# Patient Record
Sex: MALE | Race: WHITE | Employment: OTHER | URBAN - METROPOLITAN AREA
[De-identification: names, ages, dates, MRNs, and addresses within clinical notes are randomized per-mention and may not be internally consistent; named-entity substitution may affect disease eponyms.]

---

## 2017-01-11 ENCOUNTER — HOSPITAL ENCOUNTER (INPATIENT)
Age: 62
LOS: 8 days | Discharge: HOME HEALTH | DRG: 191 | End: 2017-01-19
Attending: EMERGENCY MEDICINE | Admitting: INTERNAL MEDICINE
Payer: COMMERCIAL

## 2017-01-11 ENCOUNTER — APPOINTMENT (OUTPATIENT)
Dept: GENERAL RADIOLOGY | Age: 62
DRG: 191 | End: 2017-01-11
Attending: EMERGENCY MEDICINE
Payer: COMMERCIAL

## 2017-01-11 DIAGNOSIS — J15.9 COMMUNITY ACQUIRED BACTERIAL PNEUMONIA: ICD-10-CM

## 2017-01-11 DIAGNOSIS — I48.19 PERSISTENT ATRIAL FIBRILLATION (HCC): ICD-10-CM

## 2017-01-11 DIAGNOSIS — I48.91 ATRIAL FIBRILLATION WITH RVR (HCC): ICD-10-CM

## 2017-01-11 DIAGNOSIS — J18.9 CAP (COMMUNITY ACQUIRED PNEUMONIA): Primary | ICD-10-CM

## 2017-01-11 DIAGNOSIS — G47.33 OSA (OBSTRUCTIVE SLEEP APNEA): ICD-10-CM

## 2017-01-11 PROBLEM — A41.9 SEPSIS (HCC): Status: ACTIVE | Noted: 2017-01-11

## 2017-01-11 LAB
ALBUMIN SERPL BCP-MCNC: 3.6 G/DL (ref 3.2–4.6)
ALBUMIN/GLOB SERPL: 1 {RATIO} (ref 1.2–3.5)
ALP SERPL-CCNC: 89 U/L (ref 50–136)
ALT SERPL-CCNC: 47 U/L (ref 12–65)
ANION GAP BLD CALC-SCNC: 12 MMOL/L (ref 7–16)
AST SERPL W P-5'-P-CCNC: 31 U/L (ref 15–37)
ATRIAL RATE: 178 BPM
BASOPHILS # BLD AUTO: 0 K/UL (ref 0–0.2)
BASOPHILS # BLD: 0 % (ref 0–2)
BILIRUB SERPL-MCNC: 1 MG/DL (ref 0.2–1.1)
BNP SERPL-MCNC: 207 PG/ML
BUN SERPL-MCNC: 12 MG/DL (ref 8–23)
CALCIUM SERPL-MCNC: 8.5 MG/DL (ref 8.3–10.4)
CALCULATED P AXIS, ECG09: NORMAL DEGREES
CALCULATED R AXIS, ECG10: 72 DEGREES
CALCULATED T AXIS, ECG11: 35 DEGREES
CHLORIDE SERPL-SCNC: 103 MMOL/L (ref 98–107)
CO2 SERPL-SCNC: 27 MMOL/L (ref 21–32)
CREAT SERPL-MCNC: 1.02 MG/DL (ref 0.8–1.5)
DIAGNOSIS, 93000: NORMAL
DIASTOLIC BP, ECG02: NORMAL MMHG
DIFFERENTIAL METHOD BLD: ABNORMAL
EOSINOPHIL # BLD: 0.1 K/UL (ref 0–0.8)
EOSINOPHIL NFR BLD: 0 % (ref 0.5–7.8)
ERYTHROCYTE [DISTWIDTH] IN BLOOD BY AUTOMATED COUNT: 12.5 % (ref 11.9–14.6)
GLOBULIN SER CALC-MCNC: 3.7 G/DL (ref 2.3–3.5)
GLUCOSE SERPL-MCNC: 93 MG/DL (ref 65–100)
HCT VFR BLD AUTO: 46.3 % (ref 41.1–50.3)
HGB BLD-MCNC: 15.9 G/DL (ref 13.6–17.2)
IMM GRANULOCYTES # BLD: 0 K/UL (ref 0–0.5)
IMM GRANULOCYTES NFR BLD AUTO: 0.3 % (ref 0–5)
LACTATE BLD-SCNC: 0.7 MMOL/L (ref 0.5–1.9)
LYMPHOCYTES # BLD AUTO: 15 % (ref 13–44)
LYMPHOCYTES # BLD: 2.1 K/UL (ref 0.5–4.6)
MAGNESIUM SERPL-MCNC: 2.3 MG/DL (ref 1.8–2.4)
MCH RBC QN AUTO: 34.3 PG (ref 26.1–32.9)
MCHC RBC AUTO-ENTMCNC: 34.3 G/DL (ref 31.4–35)
MCV RBC AUTO: 100 FL (ref 79.6–97.8)
MONOCYTES # BLD: 0.8 K/UL (ref 0.1–1.3)
MONOCYTES NFR BLD AUTO: 6 % (ref 4–12)
NEUTS SEG # BLD: 10.8 K/UL (ref 1.7–8.2)
NEUTS SEG NFR BLD AUTO: 79 % (ref 43–78)
P-R INTERVAL, ECG05: NORMAL MS
PHOSPHATE SERPL-MCNC: 2.2 MG/DL (ref 2.3–3.7)
PLATELET # BLD AUTO: 232 K/UL (ref 150–450)
PMV BLD AUTO: 10 FL (ref 10.8–14.1)
POTASSIUM SERPL-SCNC: 3.8 MMOL/L (ref 3.5–5.1)
PROCALCITONIN SERPL-MCNC: <0.1 NG/ML
PROT SERPL-MCNC: 7.3 G/DL (ref 6.3–8.2)
Q-T INTERVAL, ECG07: 284 MS
QRS DURATION, ECG06: 66 MS
QTC CALCULATION (BEZET), ECG08: 438 MS
RBC # BLD AUTO: 4.63 M/UL (ref 4.23–5.67)
SODIUM SERPL-SCNC: 142 MMOL/L (ref 136–145)
SYSTOLIC BP, ECG01: NORMAL MMHG
TROPONIN I BLD-MCNC: 0 NG/ML (ref 0–0.08)
TROPONIN I BLD-MCNC: 0.01 NG/ML (ref 0–0.08)
TSH SERPL DL<=0.005 MIU/L-ACNC: 3.13 UIU/ML (ref 0.36–3.74)
VENTRICULAR RATE, ECG03: 143 BPM
WBC # BLD AUTO: 13.8 K/UL (ref 4.3–11.1)

## 2017-01-11 PROCEDURE — 74011000258 HC RX REV CODE- 258: Performed by: EMERGENCY MEDICINE

## 2017-01-11 PROCEDURE — 84145 PROCALCITONIN (PCT): CPT | Performed by: INTERNAL MEDICINE

## 2017-01-11 PROCEDURE — 83605 ASSAY OF LACTIC ACID: CPT

## 2017-01-11 PROCEDURE — 83735 ASSAY OF MAGNESIUM: CPT | Performed by: INTERNAL MEDICINE

## 2017-01-11 PROCEDURE — 96374 THER/PROPH/DIAG INJ IV PUSH: CPT | Performed by: EMERGENCY MEDICINE

## 2017-01-11 PROCEDURE — 83880 ASSAY OF NATRIURETIC PEPTIDE: CPT | Performed by: INTERNAL MEDICINE

## 2017-01-11 PROCEDURE — 74011250636 HC RX REV CODE- 250/636: Performed by: EMERGENCY MEDICINE

## 2017-01-11 PROCEDURE — 84100 ASSAY OF PHOSPHORUS: CPT | Performed by: INTERNAL MEDICINE

## 2017-01-11 PROCEDURE — 99254 IP/OBS CNSLTJ NEW/EST MOD 60: CPT | Performed by: INTERNAL MEDICINE

## 2017-01-11 PROCEDURE — 80053 COMPREHEN METABOLIC PANEL: CPT | Performed by: EMERGENCY MEDICINE

## 2017-01-11 PROCEDURE — 74011000250 HC RX REV CODE- 250: Performed by: EMERGENCY MEDICINE

## 2017-01-11 PROCEDURE — 99285 EMERGENCY DEPT VISIT HI MDM: CPT | Performed by: EMERGENCY MEDICINE

## 2017-01-11 PROCEDURE — 71010 XR CHEST PORT: CPT

## 2017-01-11 PROCEDURE — 87040 BLOOD CULTURE FOR BACTERIA: CPT | Performed by: EMERGENCY MEDICINE

## 2017-01-11 PROCEDURE — 93005 ELECTROCARDIOGRAM TRACING: CPT | Performed by: EMERGENCY MEDICINE

## 2017-01-11 PROCEDURE — 74011250636 HC RX REV CODE- 250/636: Performed by: INTERNAL MEDICINE

## 2017-01-11 PROCEDURE — 85025 COMPLETE CBC W/AUTO DIFF WBC: CPT | Performed by: EMERGENCY MEDICINE

## 2017-01-11 PROCEDURE — 65660000000 HC RM CCU STEPDOWN

## 2017-01-11 PROCEDURE — 96376 TX/PRO/DX INJ SAME DRUG ADON: CPT | Performed by: EMERGENCY MEDICINE

## 2017-01-11 PROCEDURE — 84484 ASSAY OF TROPONIN QUANT: CPT

## 2017-01-11 PROCEDURE — 74011250637 HC RX REV CODE- 250/637: Performed by: INTERNAL MEDICINE

## 2017-01-11 PROCEDURE — 84443 ASSAY THYROID STIM HORMONE: CPT | Performed by: INTERNAL MEDICINE

## 2017-01-11 RX ORDER — ROSUVASTATIN CALCIUM 20 MG/1
20 TABLET, COATED ORAL
Status: DISCONTINUED | OUTPATIENT
Start: 2017-01-11 | End: 2017-01-19 | Stop reason: HOSPADM

## 2017-01-11 RX ORDER — DULOXETIN HYDROCHLORIDE 60 MG/1
120 CAPSULE, DELAYED RELEASE ORAL DAILY
Status: DISCONTINUED | OUTPATIENT
Start: 2017-01-12 | End: 2017-01-19 | Stop reason: HOSPADM

## 2017-01-11 RX ORDER — METOPROLOL TARTRATE 5 MG/5ML
5 INJECTION INTRAVENOUS ONCE
Status: DISCONTINUED | OUTPATIENT
Start: 2017-01-11 | End: 2017-01-11

## 2017-01-11 RX ORDER — DILTIAZEM HYDROCHLORIDE 5 MG/ML
20 INJECTION INTRAVENOUS
Status: COMPLETED | OUTPATIENT
Start: 2017-01-11 | End: 2017-01-11

## 2017-01-11 RX ORDER — GABAPENTIN 100 MG/1
200 CAPSULE ORAL 2 TIMES DAILY
Status: DISCONTINUED | OUTPATIENT
Start: 2017-01-11 | End: 2017-01-19 | Stop reason: HOSPADM

## 2017-01-11 RX ORDER — PANTOPRAZOLE SODIUM 40 MG/1
40 TABLET, DELAYED RELEASE ORAL
Status: DISCONTINUED | OUTPATIENT
Start: 2017-01-12 | End: 2017-01-19 | Stop reason: HOSPADM

## 2017-01-11 RX ORDER — LANOLIN ALCOHOL/MO/W.PET/CERES
400 CREAM (GRAM) TOPICAL
Status: DISCONTINUED | OUTPATIENT
Start: 2017-01-12 | End: 2017-01-15

## 2017-01-11 RX ORDER — IPRATROPIUM BROMIDE 0.5 MG/2.5ML
0.5 SOLUTION RESPIRATORY (INHALATION)
Status: DISCONTINUED | OUTPATIENT
Start: 2017-01-12 | End: 2017-01-13

## 2017-01-11 RX ORDER — SODIUM CHLORIDE 9 MG/ML
125 INJECTION, SOLUTION INTRAVENOUS CONTINUOUS
Status: DISCONTINUED | OUTPATIENT
Start: 2017-01-11 | End: 2017-01-13

## 2017-01-11 RX ORDER — ZOLPIDEM TARTRATE 5 MG/1
10 TABLET ORAL
Status: DISCONTINUED | OUTPATIENT
Start: 2017-01-11 | End: 2017-01-19 | Stop reason: HOSPADM

## 2017-01-11 RX ORDER — IPRATROPIUM BROMIDE 0.5 MG/2.5ML
0.5 SOLUTION RESPIRATORY (INHALATION)
Status: DISCONTINUED | OUTPATIENT
Start: 2017-01-11 | End: 2017-01-11

## 2017-01-11 RX ORDER — FLUTICASONE PROPIONATE 50 MCG
2 SPRAY, SUSPENSION (ML) NASAL DAILY
Status: DISCONTINUED | OUTPATIENT
Start: 2017-01-12 | End: 2017-01-19 | Stop reason: HOSPADM

## 2017-01-11 RX ORDER — FENOFIBRATE 160 MG/1
160 TABLET ORAL DAILY
Status: DISCONTINUED | OUTPATIENT
Start: 2017-01-12 | End: 2017-01-19 | Stop reason: HOSPADM

## 2017-01-11 RX ORDER — SODIUM CHLORIDE 0.9 % (FLUSH) 0.9 %
5-10 SYRINGE (ML) INJECTION AS NEEDED
Status: DISCONTINUED | OUTPATIENT
Start: 2017-01-11 | End: 2017-01-19 | Stop reason: HOSPADM

## 2017-01-11 RX ADMIN — SODIUM CHLORIDE 3471 ML: 900 INJECTION, SOLUTION INTRAVENOUS at 21:17

## 2017-01-11 RX ADMIN — GABAPENTIN 200 MG: 100 CAPSULE ORAL at 21:00

## 2017-01-11 RX ADMIN — GUAIFENESIN 1200 MG: 600 TABLET, EXTENDED RELEASE ORAL at 22:29

## 2017-01-11 RX ADMIN — DILTIAZEM HYDROCHLORIDE 20 MG: 5 INJECTION INTRAVENOUS at 13:48

## 2017-01-11 RX ADMIN — CEFTRIAXONE SODIUM 1 G: 1 INJECTION, POWDER, FOR SOLUTION INTRAMUSCULAR; INTRAVENOUS at 16:48

## 2017-01-11 RX ADMIN — SODIUM CHLORIDE 5 MG/HR: 900 INJECTION, SOLUTION INTRAVENOUS at 16:33

## 2017-01-11 RX ADMIN — SODIUM CHLORIDE 125 ML/HR: 900 INJECTION, SOLUTION INTRAVENOUS at 22:23

## 2017-01-11 RX ADMIN — AZITHROMYCIN MONOHYDRATE 500 MG: 500 INJECTION, POWDER, LYOPHILIZED, FOR SOLUTION INTRAVENOUS at 17:15

## 2017-01-11 RX ADMIN — DILTIAZEM HYDROCHLORIDE 20 MG: 5 INJECTION INTRAVENOUS at 15:22

## 2017-01-11 RX ADMIN — ROSUVASTATIN CALCIUM 20 MG: 20 TABLET, FILM COATED ORAL at 22:00

## 2017-01-11 NOTE — ED PROVIDER NOTES
HPI Comments: Patient states he woke up this morning with shortness of breath and palpitations. He has a history of atrial fibrillation with similar symptoms. He denies any aggravating or relieving factors and did not take any medicine for his symptoms though he says he takes around 15 medicines each morning. He denies any chest pain or abdominal pain, denies any vomiting or diarrhea. Elements of this note were created using speech recognition software. As such, errors of speech recognition may be present. Patient is a 57 y.o. male presenting with palpitations. The history is provided by the patient. Palpitations    Associated symptoms include shortness of breath. Pertinent negatives include no fever, no chest pain, no nausea and no vomiting.         Past Medical History:   Diagnosis Date    Adverse effect of anesthesia      pt woke up during last colonoscopy    Arrhythmia 6/30/16     hx of a fib- \"I'm out of it\" -per Dr Rosario Goes; fingers    Bronchitis, chronic ([address] 6/30/16     hx of     Carotid stenosis      bilat carotid stenosis    Chronic obstructive pulmonary disease ([address] 6/30/16     affirms no SOB with 1 flight of steps; no 02; scheduled meds; last used rescue inhaler 2 d ago- avg at least 1 X wk    Chronic pain      OA- ALL MAJOR JOINTS    dyslipidemia     Malignant neoplasm of prostate ([address] 12/18/2013    Prostate cancer ([address] Dx 12/2012    Psychiatric disorder      anxiety- depression    Sleep apnea      uses CPAP       Past Surgical History:   Procedure Laterality Date    Hx cyst removal      Colonoscopy      Hx prostatectomy  2/21/2013    Hx heart catheterization      Hx other surgical  1983     lumbar     Hx other surgical       pilonidal cyst     Colonoscopy N/A 7/1/2016     COLONOSCOPY performed by Primitivo Murdock MD at [facility] ENDOSCOPY         Family History:   Problem Relation Age of Onset    Heart Attack Father      MI X 2 at age 61    Heart Disease Father     Hypertension Father     Stroke Father      CVA X 2- due to off anticoagulant    Cancer Mother     Kidney Disease Other     Other Other      Nephrolithiasis       Social History     Social History    Marital status:      Spouse name: N/A    Number of children: N/A    Years of education: N/A     Occupational History    Water/ lines Retired     exp tp asbestos, disabled with COPD     Social History Main Topics    Smoking status: Former Smoker     Packs/day: 2.00     Years: 40.00     Types: Cigarettes     Quit date: 4/1/2011    Smokeless tobacco: Never Used    Alcohol use No    Drug use: No    Sexual activity: Not on file     Other Topics Concern    Not on file     Social History Narrative    80-pack-year smoking. Quit April 2011. Occasional alcohol. He is . He is on disability for COPD. Worked in the past with the [employer] working on  and [employer] were he was exposed to asbestos. ALLERGIES: Review of patient's allergies indicates no known allergies. Review of Systems   Constitutional: Negative for chills and fever. Respiratory: Positive for shortness of breath. Negative for chest tightness. Cardiovascular: Positive for palpitations. Negative for chest pain. Gastrointestinal: Negative for nausea and vomiting. All other systems reviewed and are negative. Vitals:    01/11/17 1324   BP: (!) 135/108   Pulse: (!) 122   Resp: 22   SpO2: 97%   Weight: 115.7 kg (255 lb)   Height: 5' 11\" (1.803 m)            Physical Exam   Constitutional: He is oriented to person, place, and time. He appears well-developed and well-nourished. He appears distressed. HENT:   Head: Normocephalic and atraumatic. Eyes: Conjunctivae are normal. Pupils are equal, round, and reactive to light. Neck: Normal range of motion. Neck supple. Cardiovascular:   No murmur heard.   Irregularly irregular rate Pulmonary/Chest: Effort normal and breath sounds normal.   Abdominal: Soft. Bowel sounds are normal.   Musculoskeletal: He exhibits no edema or tenderness. Neurological: He is alert and oriented to person, place, and time. Skin: Skin is warm and dry. Psychiatric: He has a normal mood and affect. His behavior is normal.   Nursing note and vitals reviewed. MDM  Number of Diagnoses or Management Options  CAP (community acquired pneumonia):   Persistent atrial fibrillation [facility]):   Diagnosis management comments: Differential diagnosis: Atrial flutter/fibrillation, V. Tach, electrolyte abnormality  3:03 PM improvement after Cardizem but heart rate is now in the 140s, will repeat cardizem  3:46 PM heart rate improved after second dose of Cardizem though it is back up in the 130s. We will start a Cardizem drip. The radiologist read his chest x-ray as a mild right basilar infiltrate, could be triggering his atrial fibrillation.   His white count is elevated with a shift  3:59 PM Spoke with Dr Claudine Thornton, requests that medicine/cardiology admit pt  4:11 PM spoke with up to cardiology, requests that I call Dr. Julius Galicia as he admits his own patients  4:22 PM spoke with Dr. Julius Galicia, requested I admit the patient to the hospitalist  4:29 PM Spoke with hospitalist, to admit the patient       Amount and/or Complexity of Data Reviewed  Clinical lab tests: ordered and reviewed  Tests in the radiology section of CPT®: ordered and reviewed  Tests in the medicine section of CPT®: ordered and reviewed  Discuss the patient with other providers: yes  Independent visualization of images, tracings, or specimens: yes    Risk of Complications, Morbidity, and/or Mortality  Presenting problems: high  Diagnostic procedures: moderate  Management options: high    Patient Progress  Patient progress: improved    ED Course       Procedures

## 2017-01-11 NOTE — IP AVS SNAPSHOT
Current Discharge Medication List  
  
Take these medications at their scheduled times Dose & Instructions Dispensing Information Comments Morning Noon Evening Bedtime CRESTOR 20 mg tablet Generic drug:  rosuvastatin Dose:  20 mg Take 20 mg by mouth nightly. Refills:  0  
     
   
   
   
  
 CYMBALTA 60 mg capsule Generic drug:  DULoxetine Dose:  120 mg Take 120 mg by mouth every morning. Indications: ANXIETY WITH DEPRESSION Refills:  0  
     
   
   
   
  
 dicyclomine 10 mg capsule Commonly known as:  BENTYL Dose:  10 mg Take 10 mg by mouth two (2) times a day. Refills:  0  
     
   
   
   
  
 ELIQUIS 5 mg tablet Generic drug:  apixaban Dose:  5 mg Take 5 mg by mouth two (2) times a day. Refills:  0  
     
   
   
   
  
 fenofibrate nanocrystallized 145 mg tablet Commonly known as:  [employer] Dose:  145 mg Take 145 mg by mouth every morning. Refills:  0  
     
   
   
   
  
 folic acid 1 mg tablet Commonly known as:  [employer] Your next dose is:  Tomorrow Dose:  1 mg Take 1 Tab by mouth daily. Quantity:  30 Tab Refills:  11  
     
  
   
   
   
  
 gabapentin 100 mg capsule Commonly known as:  NEURONTIN Dose:  200 mg Take 200 mg by mouth two (2) times a day. Refills:  0 KlonoPIN 2 mg tablet Generic drug:  clonazePAM  
   
 Dose:  2 mg Take 2 mg by mouth three (3) times daily. Refills:  0  
     
   
   
   
  
 magnesium oxide 400 mg tablet Commonly known as:  MAG-OX Dose:  400 mg Take 400 mg by mouth every morning. Last dose 6/29/16  Indications: Erika Gupta Refills:  0  
     
   
   
   
  
 omeprazole 40 mg capsule Commonly known as:  PRILOSEC Dose:  40 mg Take 40 mg by mouth nightly. Refills:  0  
     
   
   
   
  
 sotalol 160 mg tablet Commonly known as:  Cloud Kerri Your next dose is: Today Dose:  160 mg Take 1 Tab by mouth every twelve (12) hours for 30 days. Quantity:  60 Tab Refills:  0 Around 6 pm  
   
  
 tiotropium 18 mcg inhalation capsule Commonly known as:  [address] Dose:  1 Cap Take 1 Cap by inhalation daily. Mouth care after use Quantity:  30 Cap Refills:  11 Take these medications as needed Dose & Instructions Dispensing Information Comments Morning Noon Evening Bedtime  
 albuterol 90 mcg/actuation inhaler Commonly known as:  PROAIR HFA Dose:  2 Puff Take 2 Puffs by inhalation every four (4) hours as needed for Wheezing or Shortness of Breath (cough). Mouth care after use Quantity:  1 Inhaler Refills:  11  
     
   
   
   
  
 albuterol-ipratropium 2.5 mg-0.5 mg/3 ml Nebu Commonly known as:  Kalpesh Dage Other:  As needed Dose:  3 mL  
3 mL by Nebulization route every four (4) hours as needed for up to 30 days. Quantity:  90 Nebule Refills:  2  
     
   
   
   
  
 meloxicam 7.5 mg tablet Commonly known as:  MOBIC Dose:  7.5 mg Take 7.5 mg by mouth two (2) times daily as needed for Pain. Refills:  0 MUCINEX 1,200 mg Ta12 ER tablet Generic drug:  guaiFENesin Dose:  1200 mg Take 1,200 mg by mouth as needed. Refills:  0 Take these medications as directed Dose & Instructions Dispensing Information Comments Morning Noon Evening Bedtime AMBIEN 10 mg tablet Generic drug:  zolpidem Dose:  20 mg Take 20 mg by mouth. 2 tabs every night Refills:  0  
     
   
   
   
  
 cpap machine kit 9 cm qhs Refills:  0  
     
   
   
   
  
 fluticasone 50 mcg/actuation nasal spray Commonly known as:  FLONASE  
   
 1 - 2 sprays daily each nostril, blow nose prior to use and do not blow nose for 20 min. After use Quantity:  1 Bottle Refills:  11  
     
   
   
   
  
 Where to Get Your Medications Information about where to get these medications is not yet available ! Ask your nurse or doctor about these medications  
  albuterol-ipratropium 2.5 mg-0.5 mg/3 ml Nebu  
 folic acid 1 mg tablet  
 sotalol 160 mg tablet

## 2017-01-11 NOTE — ED NOTES
Spoke with Dr Ariana Mayers about order for Cardizem not titratable. Dr Ariana Mayers to bedside. Received verbal orders to change patients rate to 15.

## 2017-01-11 NOTE — CONSULTS
CONSULT NOTE    Gali Denny    1/11/2017    Date of Admission:  1/11/2017    The patient's chart is reviewed and the patient is discussed with the staff. Subjective: The patient is a 57 y.o.  male seen and evaluated at the request of Dr. Ashley Gross. He is a pt of Dr. Carie Schulte and [facility] with a hx of PAF and COPD. He was in his usual state of health until he woke up this AM with SOB. He has not had chest pain, cough, wheezing, fever, or hemoptysis. He describes a several month hx of intermittent feelings of fever and chills and had a similar episode this AM. He has not had diffuse MS pain. He came to the ER and was found to be in afib with RVR along with HTN. We were asked to assess by the ER MD. He was started on Rocephin and Zithromax by the ER. The pt has ISABELLE and has been compliant with CPAP. He does not snore with CPAP in place. Review of Systems    Denies: fevers, chills, sweats, fatigue, malaise, anorexia, weight loss   Denies: blurry vision, loss of vision, eye pain, photophobia  Denies: hearing loss, ringing in the ears, earache, epistaxis  Denies: chest pain, palpitations, syncope, orthopnea, paroxysmal nocturnal dyspnea, claudication  Denies: dysphagia, odynophagia, nausea, vomiting, diarrhea, constipation, abdominal pain, jaundice, melena   Denies: frequency, dysuria, nocturia, urinary incontinence, stones, hematuria  Denies: polydipsia/polyuria, skin changes, temperature intolerance, unexpected weight gain  Denies: back pain, joint pain, joint swelling, muscle pain, muscle weakness  Denies: bleeding problems, blood transfusions, bruising, pallor, swollen lymph nodes  Denies: headache, dysarthria, blurred vision, diplopia,seizure, focal deficits.     Admits to: dyspnea, orthopnea              Patient Active Problem List   Diagnosis Code    COPD (chronic obstructive pulmonary disease) ([facility] J44.9    Pulmonary emphysema ([facility] J43.9    GERD (gastroesophageal reflux disease) K21.9    Pure hypercholesterolemia E78.00    Impotence of organic origin N52.9    Malignant neoplasm of prostate ([facility] C61    Community acquired bacterial pneumonia J15.9    Sepsis ([facility] A41.9    Atrial fibrillation with RVR ([facility] I48.91    ISABELLE (obstructive sleep apnea) G47.33         Prior to Admission Medications   Prescriptions Last Dose Informant Patient Reported? Taking? DULoxetine (CYMBALTA) 60 mg capsule   Yes No   Sig: Take 120 mg by mouth every morning. Indications: ANXIETY WITH DEPRESSION   albuterol (PROAIR HFA) 90 mcg/actuation inhaler   No No   Sig: Take 2 Puffs by inhalation every four (4) hours as needed for Wheezing or Shortness of Breath (cough). Mouth care after use   clonazePAM (KLONOPIN) 2 mg tablet   Yes No   Sig: Take 2 mg by mouth three (3) times daily. cpap machine kit   Yes No   Si cm qhs   diltiazem (CARDIZEM) 60 mg tablet   Yes No   Sig: Take 120 mg by mouth two (2) times a day. Indications: HYPERTENSION   doxycycline (MONODOX) 100 mg capsule   No No   Sig: Take 1 Cap by mouth two (2) times a day. Eat yogurt daily   fenofibrate nanocrystallized (TRICOR) 145 mg tablet   Yes No   Sig: Take 145 mg by mouth every morning. fluticasone (FLONASE) 50 mcg/actuation nasal spray   No No   Si - 2 sprays daily each nostril, blow nose prior to use and do not blow nose for 20 min. After use   gabapentin (NEURONTIN) 100 mg capsule   Yes No   Sig: Take 200 mg by mouth two (2) times a day. guaiFENesin (MUCINEX) 1,200 mg Ta12 ER tablet   Yes No   Sig: Take 1,200 mg by mouth as needed. magnesium oxide (MAG-OX) 400 mg tablet   Yes No   Sig: Take 400 mg by mouth every morning. Last dose 16  Indications: HYPOMAGNESEMIA   omeprazole (PRILOSEC) 40 mg capsule   Yes No   Sig: Take 40 mg by mouth nightly.    predniSONE (DELTASONE) 20 mg tablet   No No   Sig: Take 2 tabs in AM after breakfast for 3 days, then 1 1/2 tab x 3 days, 1 tab x 3 days,  1/2 tab x 3 days, stop   rosuvastatin (CRESTOR) 20 mg tablet   Yes No   Sig: Take 20 mg by mouth nightly. tiotropium (SPIRIVA WITH HANDIHALER) 18 mcg inhalation capsule   No No   Sig: Take 1 Cap by inhalation daily. Mouth care after use   Patient taking differently: Take 1 Cap by inhalation every morning. Mouth care after use   zolpidem (AMBIEN) 10 mg tablet   Yes No   Sig: Take 20 mg by mouth.  2 tabs every night      Facility-Administered Medications: None       Past Medical History   Diagnosis Date    Adverse effect of anesthesia      pt woke up during last colonoscopy    Arrhythmia 6/30/16     hx of a fib- \"I'm out of it\" -per Dr Jonathan Bell; fingers    Bronchitis, chronic ([facility] 6/30/16     hx of     Carotid stenosis      bilat carotid stenosis    Chronic obstructive pulmonary disease ([address] 6/30/16     affirms no SOB with 1 flight of steps; no 02; scheduled meds; last used rescue inhaler 2 d ago- avg at least 1 X wk    Chronic pain      OA- ALL MAJOR JOINTS    dyslipidemia     Malignant neoplasm of prostate ([address] 12/18/2013    Prostate cancer ([address] Dx 12/2012    Psychiatric disorder      anxiety- depression    Sleep apnea      uses CPAP     Past Surgical History   Procedure Laterality Date    Hx cyst removal      Colonoscopy      Hx prostatectomy  2/21/2013    Hx heart catheterization      Hx other surgical  1983     lumbar     Hx other surgical       pilonidal cyst     Colonoscopy N/A 7/1/2016     COLONOSCOPY performed by Capri Hayward MD at [facility] ENDOSCOPY     Social History     Social History    Marital status:      Spouse name: N/A    Number of children: N/A    Years of education: N/A     Occupational History    Water/ lines Retired     exp tp asbestos, disabled with COPD     Social History Main Topics    Smoking status: Former Smoker     Packs/day: 2.00     Years: 40.00     Types: Cigarettes     Quit date: 4/1/2011    Smokeless tobacco: Never Used    Alcohol use No    Drug use: No    Sexual activity: Not on file     Other Topics Concern    Not on file     Social History Narrative    80-pack-year smoking. Quit April 2011. Occasional alcohol. He is . He is on disability for COPD. Worked in the past with the [employer] working on [employer] and [employer] were he was exposed to asbestos. Family History   Problem Relation Age of Onset    Heart Attack Father      MI X 2 at age 59    Heart Disease Father     Hypertension Father     Stroke Father      CVA X 2- due to off anticoagulant    Cancer Mother     Kidney Disease Other     Other Other      Nephrolithiasis     No Known Allergies    Current Facility-Administered Medications   Medication Dose Route Frequency    dilTIAZem (CARDIZEM) 100 mg in 0.9% sodium chloride (MBP/ADV) 100 mL infusion  5 mg/hr IntraVENous CONTINUOUS    azithromycin (ZITHROMAX) 500 mg in 0.9% sodium chloride (MBP/ADV) 250 mL  500 mg IntraVENous NOW    cefTRIAXone (ROCEPHIN) 1 g in 0.9% sodium chloride (MBP/ADV) 50 mL  1 g IntraVENous NOW     Current Outpatient Prescriptions   Medication Sig    clonazePAM (KLONOPIN) 2 mg tablet Take 2 mg by mouth three (3) times daily.  predniSONE (DELTASONE) 20 mg tablet Take 2 tabs in AM after breakfast for 3 days, then 1 1/2 tab x 3 days, 1 tab x 3 days,  1/2 tab x 3 days, stop    doxycycline (MONODOX) 100 mg capsule Take 1 Cap by mouth two (2) times a day. Eat yogurt daily    diltiazem (CARDIZEM) 60 mg tablet Take 120 mg by mouth two (2) times a day. Indications: HYPERTENSION    albuterol (PROAIR HFA) 90 mcg/actuation inhaler Take 2 Puffs by inhalation every four (4) hours as needed for Wheezing or Shortness of Breath (cough). Mouth care after use    tiotropium (SPIRIVA WITH HANDIHALER) 18 mcg inhalation capsule Take 1 Cap by inhalation daily. Mouth care after use (Patient taking differently: Take 1 Cap by inhalation every morning.  Mouth care after use)    guaiFENesin (MUCINEX) 1,200 mg Ta12 ER tablet Take 1,200 mg by mouth as needed.  magnesium oxide (MAG-OX) 400 mg tablet Take 400 mg by mouth every morning. Last dose 6/29/16  Indications: HYPOMAGNESEMIA    rosuvastatin (CRESTOR) 20 mg tablet Take 20 mg by mouth nightly.  omeprazole (PRILOSEC) 40 mg capsule Take 40 mg by mouth nightly.  gabapentin (NEURONTIN) 100 mg capsule Take 200 mg by mouth two (2) times a day.  fenofibrate nanocrystallized (TRICOR) 145 mg tablet Take 145 mg by mouth every morning.  fluticasone (FLONASE) 50 mcg/actuation nasal spray 1 - 2 sprays daily each nostril, blow nose prior to use and do not blow nose for 20 min. After use    zolpidem (AMBIEN) 10 mg tablet Take 20 mg by mouth. 2 tabs every night    DULoxetine (CYMBALTA) 60 mg capsule Take 120 mg by mouth every morning. Indications: ANXIETY WITH DEPRESSION    cpap machine kit 9 cm qhs         Objective:     Vitals:    01/11/17 1538 01/11/17 1605 01/11/17 1622 01/11/17 1633   BP: (!) 140/101 139/75 118/76 118/76   Pulse: 97 (!) 112 (!) 111 (!) 118   Resp:  18     Temp:  97.6 °F (36.4 °C)     SpO2: 93% 93%     Weight:       Height:           PHYSICAL EXAM     Constitutional:  the patient is well developed and in no acute distress  EENMT:  Sclera clear, pupils equal, oral mucosa moist; OP narrow  Neck: No retractions or JVD. Respiratory: Decreased BS with a few trace rhonchi  Cardiovascular:  Tachy iRR without M,G,R  Gastrointestinal: soft and non-tender; with positive bowel sounds. Musculoskeletal: warm without cyanosis. There is no lower leg edema.   Skin:  no jaundice or rashes, plethoric  Neurologic: no gross neuro deficits     Psychiatric:  alert and oriented x 3    Chest X-ray:            Recent Labs      01/11/17   1326   WBC  13.8*   HGB  15.9   HCT  46.3   PLT  232     Recent Labs      01/11/17   1326   NA  142   K  3.8   CL  103   GLU  93   CO2  27   BUN  12   CREA  1.02   CA  8.5   ALB  3.6 TBILI  1.0   ALT  47   SGOT  31     No results for input(s): PH, PCO2, PO2, HCO3 in the last 72 hours. No results for input(s): LCAD, LAC in the last 72 hours. Assessment:  (Medical Decision Making)     Hospital Problems  Date Reviewed: 12/29/2016          Codes Class Noted POA    Community acquired bacterial pneumonia ICD-10-CM: J15.9  ICD-9-CM: 482.9  1/11/2017 Unknown    Started on ATB in ER. Not convinced he has pneumonia at this point. Check PCT, BNP, and follow cx. Sepsis ([facility] ICD-10-CM: A41.9  ICD-9-CM: 038.9, 995.91  1/11/2017 Unknown    Doubt    Atrial fibrillation with RVR ([facility] ICD-10-CM: I48.91  ICD-9-CM: 427.31  1/11/2017 Yes    Needs further eval and managememt. ISABELLE (obstructive sleep apnea) ICD-10-CM: G47.33  ICD-9-CM: 327.23  1/11/2017 Yes    Compliant with CPAP. Plan:  (Medical Decision Making)     Check PCT and BNP. Follow cx. Can continue Rocephin and Zmax for now. Mucinex. Use home CPAP. --    More than 50% of the time documented was spent in face-to-face contact with the patient and in the care of the patient on the floor/unit where the patient is located. Thank you very much for this referral.  We appreciate the opportunity to participate in this patient's care. Will follow along with above stated plan.     Vanita Dumont MD

## 2017-01-11 NOTE — H&P
HOSPITALIST H&P/CONSULT      NAME:  Elizabeth Hardin   Age:  57 y.o.  :   1955   MRN:   [id number]    PCP: Verónica Fuentes MD    Attending MD: Suha Skinner DO    Treatment Team: Primary Nurse: Dayanna Thrasher; Consulting Provider: Adwoa Aparicio MD    HPI:     Elizabeth Hardin is a  57year old CM with a PMH of COPD and PAF presented to the ER with acute on chronic shortness of breath, multiple syncopal episodes over the past 4 days, and diaphresis with chills. He woke up at 0430 and felt short of breath so he sat on the side of the bed \"with my hands on my knees\". The SOB persisted through the morning so he went to the local fire dept and they took his vital signs and noticed that his HR was in the 140s so he came to the ER. Currently he complaints of anxiety, shortness of breath, non-productive cough, and diaphoresis.     Complete ROS done and is as stated in HPI or otherwise negative    Past Medical History   Diagnosis Date    Adverse effect of anesthesia      pt woke up during last colonoscopy    Arrhythmia 16     hx of a fib- \"I'm out of it\" -per Dr Thea Laws; fingers    Bronchitis, chronic ([address] 16     hx of     Carotid stenosis      bilat carotid stenosis    Chronic obstructive pulmonary disease ([address] 16     affirms no SOB with 1 flight of steps; no 02; scheduled meds; last used rescue inhaler 2 d ago- avg at least 1 X wk    Chronic pain      OA- ALL MAJOR JOINTS    dyslipidemia     Malignant neoplasm of prostate ([address] 2013    Prostate cancer ([address] Dx 2012    Psychiatric disorder      anxiety- depression    Sleep apnea      uses CPAP        Past Surgical History   Procedure Laterality Date    Hx cyst removal      Colonoscopy      Hx prostatectomy  2013    Hx heart catheterization      Hx other surgical  1983     lumbar     Hx other surgical       pilonidal cyst     Colonoscopy N/A 2016     COLONOSCOPY performed by Lisa Root MD at [facility] ENDOSCOPY        Prior to Admission Medications   Prescriptions Last Dose Informant Patient Reported? Taking? DULoxetine (CYMBALTA) 60 mg capsule   Yes No   Sig: Take 120 mg by mouth every morning. Indications: ANXIETY WITH DEPRESSION   albuterol (PROAIR HFA) 90 mcg/actuation inhaler   No No   Sig: Take 2 Puffs by inhalation every four (4) hours as needed for Wheezing or Shortness of Breath (cough). Mouth care after use   clonazePAM (KLONOPIN) 2 mg tablet   Yes No   Sig: Take 2 mg by mouth three (3) times daily. cpap machine kit   Yes No   Si cm qhs   diltiazem (CARDIZEM) 60 mg tablet   Yes No   Sig: Take 120 mg by mouth two (2) times a day. Indications: HYPERTENSION   doxycycline (MONODOX) 100 mg capsule   No No   Sig: Take 1 Cap by mouth two (2) times a day. Eat yogurt daily   fenofibrate nanocrystallized (TRICOR) 145 mg tablet   Yes No   Sig: Take 145 mg by mouth every morning. fluticasone (FLONASE) 50 mcg/actuation nasal spray   No No   Si - 2 sprays daily each nostril, blow nose prior to use and do not blow nose for 20 min. After use   gabapentin (NEURONTIN) 100 mg capsule   Yes No   Sig: Take 200 mg by mouth two (2) times a day. guaiFENesin (MUCINEX) 1,200 mg Ta12 ER tablet   Yes No   Sig: Take 1,200 mg by mouth as needed. magnesium oxide (MAG-OX) 400 mg tablet   Yes No   Sig: Take 400 mg by mouth every morning. Last dose 16  Indications: HYPOMAGNESEMIA   omeprazole (PRILOSEC) 40 mg capsule   Yes No   Sig: Take 40 mg by mouth nightly. predniSONE (DELTASONE) 20 mg tablet   No No   Sig: Take 2 tabs in AM after breakfast for 3 days, then 1 1/2 tab x 3 days, 1 tab x 3 days,  1/2 tab x 3 days, stop   rosuvastatin (CRESTOR) 20 mg tablet   Yes No   Sig: Take 20 mg by mouth nightly. tiotropium (SPIRIVA WITH HANDIHALER) 18 mcg inhalation capsule   No No   Sig: Take 1 Cap by inhalation daily.  Mouth care after use   Patient taking differently: Take 1 Cap by inhalation every morning. Mouth care after use   zolpidem (AMBIEN) 10 mg tablet   Yes No   Sig: Take 20 mg by mouth. 2 tabs every night      Facility-Administered Medications: None       No Known Allergies     Social History   Substance Use Topics    Smoking status: Former Smoker     Packs/day: 2.00     Years: 40.00     Types: Cigarettes     Quit date: 2011    Smokeless tobacco: Never Used    Alcohol use No        Family History   Problem Relation Age of Onset    Heart Attack Father      MI X 2 at age 61    Heart Disease Father     Hypertension Father     Stroke Father      CVA X 2- due to off anticoagulant    Cancer Mother     Kidney Disease Other     Other Other      Nephrolithiasis        Objective:       Visit Vitals    /76    Pulse (!) 118    Temp 97.6 °F (36.4 °C)    Resp 18    Ht 5' 11\" (1.803 m)    Wt 115.7 kg (255 lb)    SpO2 93%    BMI 35.57 kg/m2        Temp (24hrs), Av.6 °F (36.4 °C), Min:97.6 °F (36.4 °C), Max:97.6 °F (36.4 °C)      Oxygen Therapy  O2 Sat (%): 93 % (17 1605)  Pulse via Oximetry: 94 beats per minute (17 1605)  O2 Device: Room air (17 1324)      Physical Exam:      General:    Alert, cooperative, mild respiratory distress, appears stated age. Eyes:   PERRLA EOMI Anicteric  Head:   Normocephalic, without obvious abnormality, atraumatic. ENT:  Nares normal. No drainage or sinus tenderness. No lesions  Lungs:   Clear to auscultation bilaterally. No Wheezing or Rhonchi. No rales. Heart:   Irregular rate and rhythm,  Tachy, no murmur, rub or gallop. Abdomen:   Soft, non-tender. Not distended. Bowel sounds normal.   MSK:  No cyanosis. No edema. No clubbing. Spontaneously moves extremities. No deformities/lesions. Skin:     Texture, turgor normal. No rashes or lesions. Not Jaundiced. Neurologic: Alert and oriented x 3, no focal deficits   Psychiatry:      No depression/anxiety. Mood congruent for context.   Heme/Lymph/Immune: No petechiae, echymoses, overt signs of bleeding or lymphadenopathy. Data Review:   Recent Results (from the past 24 hour(s))   CBC WITH AUTOMATED DIFF    Collection Time: 01/11/17  1:26 PM   Result Value Ref Range    WBC 13.8 (H) 4.3 - 11.1 K/uL    RBC 4.63 4.23 - 5.67 M/uL    HGB 15.9 13.6 - 17.2 g/dL    HCT 46.3 41.1 - 50.3 %    .0 (H) 79.6 - 97.8 FL    MCH 34.3 (H) 26.1 - 32.9 PG    MCHC 34.3 31.4 - 35.0 g/dL    RDW 12.5 11.9 - 14.6 %    PLATELET 320 492 - 411 K/uL    MPV 10.0 (L) 10.8 - 14.1 FL    DF AUTOMATED      NEUTROPHILS 79 (H) 43 - 78 %    LYMPHOCYTES 15 13 - 44 %    MONOCYTES 6 4.0 - 12.0 %    EOSINOPHILS 0 (L) 0.5 - 7.8 %    BASOPHILS 0 0.0 - 2.0 %    IMMATURE GRANULOCYTES 0.3 0.0 - 5.0 %    ABS. NEUTROPHILS 10.8 (H) 1.7 - 8.2 K/UL    ABS. LYMPHOCYTES 2.1 0.5 - 4.6 K/UL    ABS. MONOCYTES 0.8 0.1 - 1.3 K/UL    ABS. EOSINOPHILS 0.1 0.0 - 0.8 K/UL    ABS. BASOPHILS 0.0 0.0 - 0.2 K/UL    ABS. IMM. GRANS. 0.0 0.0 - 0.5 K/UL   METABOLIC PANEL, COMPREHENSIVE    Collection Time: 01/11/17  1:26 PM   Result Value Ref Range    Sodium 142 136 - 145 mmol/L    Potassium 3.8 3.5 - 5.1 mmol/L    Chloride 103 98 - 107 mmol/L    CO2 27 21 - 32 mmol/L    Anion gap 12 7 - 16 mmol/L    Glucose 93 65 - 100 mg/dL    BUN 12 8 - 23 MG/DL    Creatinine 1.02 0.8 - 1.5 MG/DL    GFR est AA >60 >60 ml/min/1.73m2    GFR est non-AA >60 >60 ml/min/1.73m2    Calcium 8.5 8.3 - 10.4 MG/DL    Bilirubin, total 1.0 0.2 - 1.1 MG/DL    ALT 47 12 - 65 U/L    AST 31 15 - 37 U/L    Alk.  phosphatase 89 50 - 136 U/L    Protein, total 7.3 6.3 - 8.2 g/dL    Albumin 3.6 3.2 - 4.6 g/dL    Globulin 3.7 (H) 2.3 - 3.5 g/dL    A-G Ratio 1.0 (L) 1.2 - 3.5     EKG, 12 LEAD, INITIAL    Collection Time: 01/11/17  1:42 PM   Result Value Ref Range    Systolic BP  mmHg    Diastolic BP  mmHg    Ventricular Rate 143 BPM    Atrial Rate 178 BPM    P-R Interval  ms    QRS Duration 66 ms    Q-T Interval 284 ms    QTC Calculation (Bezet) 438 ms Calculated P Axis  degrees    Calculated R Axis 72 degrees    Calculated T Axis 35 degrees    Diagnosis       !! AGE AND GENDER SPECIFIC ECG ANALYSIS !!  !!! Poor data quality, interpretation may be adversely affected  Atrial fibrillation with rapid ventricular response  Abnormal ECG  Confirmed by Katiuska Muse MD (), ROXANNE MANN ([id number]) on 1/11/2017 2:00:14 PM     POC TROPONIN-I    Collection Time: 01/11/17  2:49 PM   Result Value Ref Range    Troponin-I (POC) 0.01 0.0 - 0.08 ng/ml   POC LACTIC ACID    Collection Time: 01/11/17  4:16 PM   Result Value Ref Range    Lactic Acid (POC) 0.7 0.5 - 1.9 mmol/L       Imaging /Procedures /Studies:    Chest X-Ray - Right Basilar Infiltrate    Assessment and Plan: Active Hospital Problems    Diagnosis Date Noted    Community acquired bacterial pneumonia 01/11/2017    Sepsis [address] 01/11/2017    Atrial fibrillation with RVR ([facility] 01/11/2017    ISABELLE (obstructive sleep apnea) 01/11/2017       PLAN  - Admit to telemetry unit for atrial fib with RVR (CHADS = 1) and community acquired pneumonia  - Consult Dr. Toby Coreas (Cardiology)  - Continue Cardizem gtt  - Check TSH, ECHO  - Start Ceftriaxone and Azithromycin  - Check blood cultures, procalcitonin  - Bolused NS in ER.  Will continue NS @ 125 ml/hr  - Will transition to PO Cardizem per cardiology  - Continue home CPAP    Code Status: FULL CODE  DVT Prophylaxis: Lovenox    Anticipated discharge: 2 days      [employer], DO  5:00 PM

## 2017-01-11 NOTE — IP AVS SNAPSHOT
[address] 
[phone] Patient: Yanira Weiner MRN: [id number] [facility]:2/38/5058 You are allergic to the following No active allergies Recent Documentation Height Weight BMI Smoking Status 1.829 m 108.8 kg 32.52 kg/m2 Former Smoker Emergency Contacts Name Discharge Info Relation Home Work [facility] [3] Spouse [3] [phone] About your hospitalization You were admitted on:  January 11, 2017 You last received care in the:  [facility] 3 TELEMETRY You were discharged on:  January 19, 2017 Unit phone number:  [phone] Why you were hospitalized Your primary diagnosis was:  Community Acquired Bacterial Pneumonia Your diagnoses also included:  Sepsis (Hcc), Atrial Fibrillation With Rvr (Hcc), Luke (Obstructive Sleep Apnea) Providers Seen During Your Hospitalizations Provider Role Specialty Primary office phone Ismael Henry MD Attending Provider Emergency Medicine [phone] Brian Belcher DO Attending Provider Internal Medicine [phone] Your Primary Care Physician (PCP) Primary Care Physician Office Phone Office Fax Kayla Ward  Follow-up Information Follow up With Details Comments Contact Info Angelo Leos MD  As needed [address] [facility] [address] [facility] [address] [phone] Agnes Bill MD In 3 weeks hospitalization for COPD exacerbation; Wednesday 2/15/2017 at 12:50 [address] [facility] [address] [phone] Lazarus Pacheco MD In 2 weeks started on sotalol while inpatient; [facility] [address] [facility] [id number] [phone] Your Appointments Wednesday February 01, 2017  9:00 AM EST HOSPITAL FOLLOW-UP with Lazarus Pacheco MD  
 [facility] Cardiology ([address] [facility]) [address] 
[address]  
[phone] Thursday February 02, 2017  9:00 AM EST  
BLOOD DRAW with PGU52 BLOOD DRAW [facility] 52 ([facility]OLOG[facility]) [address] [address]  
[phone] Thursday February 09, 2017  2:50 PM EST Office Visit with Shantanu Alonso DO [facility] 52 ([facility]OLOG[facility]) [address] [address]  
[phone] Wednesday February 15, 2017  1:20 PM EST HOSPITAL with Sharyle Bevel, NP [facility] ([facility]) [address] [facility] [address] [place] [address]  
[phone] Current Discharge Medication List  
  
START taking these medications Dose & Instructions Dispensing Information Comments Morning Noon Evening Bedtime  
 albuterol-ipratropium 2.5 mg-0.5 mg/3 ml Nebu Commonly known as:  [place] Savita Other:  As needed Dose:  3 mL  
3 mL by Nebulization route every four (4) hours as needed for up to 30 days. Quantity:  90 Nebule Refills:  2  
     
   
   
   
  
 folic acid 1 mg tablet Commonly known as:  [employer] Your next dose is:  Tomorrow Dose:  1 mg Take 1 Tab by mouth daily. Quantity:  30 Tab Refills:  11 CONTINUE these medications which have CHANGED Dose & Instructions Dispensing Information Comments Morning Noon Evening Bedtime  
 sotalol 160 mg tablet Commonly known as:  Stephanie Randle What changed:   
- medication strength 
- how much to take - when to take this Your next dose is: Today Dose:  160 mg Take 1 Tab by mouth every twelve (12) hours for 30 days. Quantity:  60 Tab Refills:  0 Around 6 pm  
   
  
 tiotropium 18 mcg inhalation capsule Commonly known as:  [employer] [facility] [employer] [place] 42cm  
 What changed:   
- when to take this 
- additional instructions Dose:  1 Cap Take 1 Cap by inhalation daily. Mouth care after use Quantity:  30 Cap Refills:  11 CONTINUE these medications which have NOT CHANGED Dose & Instructions Dispensing Information Comments Morning Noon Evening Bedtime  
 albuterol 90 mcg/actuation inhaler Commonly known as:  PROAIR HFA Dose:  2 Puff Take 2 Puffs by inhalation every four (4) hours as needed for Wheezing or Shortness of Breath (cough). Mouth care after use Quantity:  1 Inhaler Refills:  11 AMBIEN 10 mg tablet Generic drug:  zolpidem Dose:  20 mg Take 20 mg by mouth. 2 tabs every night Refills:  0  
     
   
   
   
  
 cpap machine kit 9 cm qhs Refills:  0  
     
   
   
   
  
 CRESTOR 20 mg tablet Generic drug:  rosuvastatin Dose:  20 mg Take 20 mg by mouth nightly. Refills:  0  
     
   
   
   
  
 CYMBALTA 60 mg capsule Generic drug:  DULoxetine Dose:  120 mg Take 120 mg by mouth every morning. Indications: ANXIETY WITH DEPRESSION Refills:  0  
     
   
   
   
  
 dicyclomine 10 mg capsule Commonly known as:  BENTYL Dose:  10 mg Take 10 mg by mouth two (2) times a day. Refills:  0  
     
   
   
   
  
 ELIQUIS 5 mg tablet Generic drug:  apixaban Dose:  5 mg Take 5 mg by mouth two (2) times a day. Refills:  0  
     
   
   
   
  
 fenofibrate nanocrystallized 145 mg tablet Commonly known as:  [employer] Dose:  145 mg Take 145 mg by mouth every morning. Refills:  0  
     
   
   
   
  
 fluticasone 50 mcg/actuation nasal spray Commonly known as:  FLONASE  
   
 1 - 2 sprays daily each nostril, blow nose prior to use and do not blow nose for 20 min. After use Quantity:  1 Bottle Refills:  11  
     
   
   
   
  
 gabapentin 100 mg capsule Commonly known as:  NEURONTIN  
   
 Dose:  200 mg Take 200 mg by mouth two (2) times a day. Refills:  0 KlonoPIN 2 mg tablet Generic drug:  clonazePAM  
   
 Dose:  2 mg Take 2 mg by mouth three (3) times daily. Refills:  0  
     
   
   
   
  
 magnesium oxide 400 mg tablet Commonly known as:  MAG-OX Dose:  400 mg Take 400 mg by mouth every morning. Last dose 6/29/16  Indications: Cherene Hem Refills:  0  
     
   
   
   
  
 meloxicam 7.5 mg tablet Commonly known as:  MOBIC Dose:  7.5 mg Take 7.5 mg by mouth two (2) times daily as needed for Pain. Refills:  0 MUCINEX 1,200 mg Ta12 ER tablet Generic drug:  guaiFENesin Dose:  1200 mg Take 1,200 mg by mouth as needed. Refills:  0  
     
   
   
   
  
 omeprazole 40 mg capsule Commonly known as:  PRILOSEC Dose:  40 mg Take 40 mg by mouth nightly. Refills:  0 STOP taking these medications   
 dilTIAZem 60 mg tablet Commonly known as:  CARDIZEM  
   
  
 doxycycline 100 mg capsule Commonly known as:  MONODOX  
   
  
 metoprolol tartrate 25 mg tablet Commonly known as:  LOPRESSOR  
   
  
 predniSONE 20 mg tablet Commonly known as:  Mohini Angola Where to Get Your Medications Information on where to get these meds will be given to you by the nurse or doctor. ! Ask your nurse or doctor about these medications  
  albuterol-ipratropium 2.5 mg-0.5 mg/3 ml Nebu  
 folic acid 1 mg tablet  
 sotalol 160 mg tablet Discharge Instructions Atrial Fibrillation: Care Instructions Your Care Instructions Atrial fibrillation is an irregular and often fast heartbeat. Treating this condition is important for several reasons. It can cause blood clots, which can travel from your heart to your brain and cause a stroke. If you have a fast heartbeat, you may feel lightheaded, dizzy, and weak.  An irregular heartbeat can also increase your risk for heart failure. Atrial fibrillation is often the result of another heart condition, such as high blood pressure or coronary artery disease. Making changes to improve your heart condition will help you stay healthy and active. Follow-up care is a key part of your treatment and safety. Be sure to make and go to all appointments, and call your doctor if you are having problems. It's also a good idea to know your test results and keep a list of the medicines you take. How can you care for yourself at home? Medicines · Take your medicines exactly as prescribed. Call your doctor if you think you are having a problem with your medicine. You will get more details on the specific medicines your doctor prescribes. · If your doctor has given you a blood thinner to prevent a stroke, be sure you get instructions about how to take your medicine safely. Blood thinners can cause serious bleeding problems. · Do not take any vitamins, over-the-counter drugs, or herbal products without talking to your doctor first. 
Lifestyle changes · Do not smoke. Smoking can increase your chance of a stroke and heart attack. If you need help quitting, talk to your doctor about stop-smoking programs and medicines. These can increase your chances of quitting for good. · Eat a heart-healthy diet. · Stay at a healthy weight. Lose weight if you need to. · Limit alcohol to 2 drinks a day for men and 1 drink a day for women. Too much alcohol can cause health problems. · Avoid colds and flu. Get a pneumococcal vaccine shot. If you have had one before, ask your doctor whether you need another dose. Get a flu shot every year. If you must be around people with colds or flu, wash your hands often. Activity · If your doctor recommends it, get more exercise. Walking is a good choice. Bit by bit, increase the amount you walk every day.  Try for at least 30 minutes on most days of the week. You also may want to swim, bike, or do other activities. Your doctor may suggest that you join a cardiac rehabilitation program so that you can have help increasing your physical activity safely. · Start light exercise if your doctor says it is okay. Even a small amount will help you get stronger, have more energy, and manage stress. Walking is an easy way to get exercise. Start out by walking a little more than you did in the hospital. Gradually increase the amount you walk. · When you exercise, watch for signs that your heart is working too hard. You are pushing too hard if you cannot talk while you are exercising. If you become short of breath or dizzy or have chest pain, sit down and rest immediately. · Check your pulse regularly. Place two fingers on the artery at the palm side of your wrist, in line with your thumb. If your heartbeat seems uneven or fast, talk to your doctor. When should you call for help? Call 911 anytime you think you may need emergency care. For example, call if: 
· You have symptoms of a heart attack. These may include: ¨ Chest pain or pressure, or a strange feeling in the chest. 
¨ Sweating. ¨ Shortness of breath. ¨ Nausea or vomiting. ¨ Pain, pressure, or a strange feeling in the back, neck, jaw, or upper belly or in one or both shoulders or arms. ¨ Lightheadedness or sudden weakness. ¨ A fast or irregular heartbeat. After you call 911, the  may tell you to chew 1 adult-strength or 2 to 4 low-dose aspirin. Wait for an ambulance. Do not try to drive yourself. · You have symptoms of a stroke. These may include: 
¨ Sudden numbness, tingling, weakness, or loss of movement in your face, arm, or leg, especially on only one side of your body. ¨ Sudden vision changes. ¨ Sudden trouble speaking. ¨ Sudden confusion or trouble understanding simple statements. ¨ Sudden problems with walking or balance. ¨ A sudden, severe headache that is different from past headaches. · You passed out (lost consciousness). Call your doctor now or seek immediate medical care if: 
· You have new or increased shortness of breath. · You feel dizzy or lightheaded, or you feel like you may faint. · Your heart rate becomes irregular. · You can feel your heart flutter in your chest or skip heartbeats. Tell your doctor if these symptoms are new or worse. Watch closely for changes in your health, and be sure to contact your doctor if you have any problems. Where can you learn more? Go to http://annalee-sapphire.info/. Enter U020 in the search box to learn more about \"Atrial Fibrillation: Care Instructions. \" Current as of: January 27, 2016 Content Version: 11.1 © 5751-0289 [employer]. Care instructions adapted under license by [employer] (which disclaims liability or warranty for this information). If you have questions about a medical condition or this instruction, always ask your healthcare professional. [place] [address] any warranty or liability for your use of this information. Electrical Cardioversion: What to Expect at [facility] Your Recovery Electrical cardioversion is a treatment for an abnormal heartbeat, such as atrial fibrillation, supraventricular tachycardia, or ventricular tachycardia (VT). It uses a brief electrical shock to reset your heart's rhythm. After cardioversion, you may have redness, like a sunburn, where the patches were. The medicines you got to make you sleepy may make you feel drowsy for the rest of the day. Your doctor may have you take medicines to help the heart beat normally and to prevent blood clots. This care sheet gives you a general idea about how long it will take for you to recover. But each person recovers at a different pace. Follow the steps below to feel better as quickly as possible. How can you care for yourself at home? Medicines · Be safe with medicines. Take your medicines exactly as prescribed. Call your doctor if you think you are having a problem with your medicine. You may take one or more of the following medicines: 
¨ Rate-control medicines to slow the heart rate. These include beta-blockers, calcium channel blockers, and digoxin. ¨ Rhythm control medicines that help the heart keep a normal rhythm. ¨ Blood thinners, also called anticoagulants, which help prevent blood clots. You will get more details on the specific medicines your doctor prescribes. Be sure you know how to take your medicines safely. · Do not take any vitamins, over-the-counter medicines, or herbal products without talking to your doctor first. 
Exercise · Start light exercise if your doctor says that it is okay. Even a small amount will help you get stronger, have more energy, and manage your stress. Walking is an easy way to get exercise. Start out by walking a little more than you did in the hospital. Bit by bit, increase the amount you walk. · When you exercise, watch for signs that your heart is working too hard. You are pushing too hard if you cannot talk while you are exercising. If you become short of breath or dizzy or have chest pain, sit down and rest right away. · Check your pulse regularly. Place two fingers on the artery at the palm side of your wrist in line with your thumb. If your heartbeat seems uneven or fast, talk to your doctor. Other instructions · Ask your doctor when you can drive again. · Do not smoke. If you need help quitting, talk to your doctor about stop-smoking programs and medicines. These can increase your chances of quitting for good. · Limit alcohol. Follow-up care is a key part of your treatment and safety. Be sure to make and go to all appointments, and call your doctor if you are having problems.  It's also a good idea to know your test results and keep a list of the medicines you take. When should you call for help? Call 911 anytime you think you may need emergency care. For example, call if: 
· You have chest pain or pressure. This may occur with: ¨ Sweating. ¨ Shortness of breath. ¨ Nausea or vomiting. ¨ Pain that spreads from the chest to the neck, jaw, or one or both shoulders or arms. ¨ A fast or uneven pulse. After calling 911, the  may tell you to chew 1 adult-strength or 2 to 4 low-dose aspirin. Wait for an ambulance. Do not try to drive yourself. · You have symptoms of a stroke. These may include: 
¨ Sudden numbness, tingling, weakness, or loss of movement in your face, arm, or leg, especially on only one side of your body. ¨ Sudden vision changes. ¨ Sudden trouble speaking. ¨ Sudden confusion or trouble understanding simple statements. ¨ Sudden problems with walking or balance. ¨ A sudden, severe headache that is different from past headaches. · You vomit blood or what looks like coffee grounds. · You pass maroon or very bloody stools. · You passed out (lost consciousness). Call your doctor now or seek immediate medical care if: 
· You feel dizzy or lightheaded, or you feel like you may faint. · Your heart rate becomes irregular. · You have shortness of breath. · You have any unusual bleeding, such as: ¨ Bruises or blood spots under the skin. ¨ A nosebleed that you cannot stop. ¨ Bleeding gums when you brush your teeth. ¨ Blood in your urine. ¨ Vaginal bleeding when you are not having your period, or heavy period bleeding. ¨ Your stools are black and tarlike or have streaks of blood. Watch closely for any changes in your health, and be sure to contact your doctor if: 
· You do not get better as expected. Where can you learn more? Go to http://annalee-sapphire.info/. Enter A617 in the search box to learn more about \"Electrical Cardioversion: What to Expect at Home. \" Current as of: January 27, 2016 Content Version: 11.1 © 4205-1901 [employer]. Care instructions adapted under license by [employer] (which disclaims liability or warranty for this information). If you have questions about a medical condition or this instruction, always ask your healthcare professional. [address] any warranty or liability for your use of this information. Sotalol (By mouth) Sotalol (CRYSTAL-ta-lol) Treats heart rhythm problems. This medicine is a beta blocker. Brand Name(s):Betapace, Betapace AF, Sorine, [employer] There may be other brand names for this medicine. When This Medicine Should Not Be Used: This medicine is not right for everyone. Do not use it if you had an allergic reaction to sotalol, or you have certain heart or lung problems. Talk with your doctor about what these problems are. How to Use This Medicine:  
Liquid, Tablet · Take your medicine as directed. Your dose may need to be changed several times to find what works best for you. · Measure the oral liquid medicine with a marked measuring spoon, oral syringe, or medicine cup. · Contact your doctor or pharmacist before your supply of this medicine starts to run low. Do not allow yourself to run out of medicine. · Read and follow the patient instructions that come with this medicine. Talk to your doctor or pharmacist if you have any questions. · Missed dose: Take a dose as soon as you remember. If it is almost time for your next dose, wait until then and take a regular dose. Do not take extra medicine to make up for a missed dose. · Store the medicine in a closed container at room temperature, away from heat, moisture, and direct light. Drugs and Foods to Avoid: Ask your doctor or pharmacist before using any other medicine, including over-the-counter medicines, vitamins, and herbal products. · Some foods and medicines can affect how sotalol works. Tell your doctor if you are using the following: ¨ Albuterol, clonidine, digoxin, guanethidine, isoproterenol, reserpine, terbutaline ¨ Blood pressure medicines ¨ Insulin or diabetes medicine ¨ Medicine to treat depression ¨ Medicine to treat an infection ¨ Other medicine to treat heart rhythm problems, such as amiodarone, disopyramide, procainamide, or quinidine ¨ Phenothiazine medicine (such as chlorpromazine, perphenazine, prochlorperazine, promethazine, thioridazine) · If you are taking an antacid that contains aluminum or magnesium hydroxide, take it 2 hours before or 2 hours after you take sotalol. Warnings While Using This Medicine: · Tell your doctor if you are pregnant or breastfeeding, or if you have kidney disease, diabetes, heart disease, angina, low blood pressure, lung or breathing problems, overactive thyroid, or a history of severe allergic reactions or heart attack. · This medicine may cause increased heart rhythm problems while your dose is being adjusted. · Do not stop using this medicine suddenly. Your doctor will need to slowly decrease your dose before you stop it completely. You could have worsening chest pain or heart rhythm problems if you stop using this medicine suddenly. · This medicine may raise or lower your blood sugar level. · This medicine may make you dizzy. Do not drive or do anything else that could be dangerous until you know how this medicine affects you. Stand up slowly if you feel dizzy or lightheaded. · Tell any doctor or dentist who treats you that you are using this medicine. · Your doctor will do lab tests at regular visits to check on the effects of this medicine. Keep all appointments. ECG tests will be needed to check for unwanted effects. · Keep all medicine out of the reach of children. Never share your medicine with anyone. Possible Side Effects While Using This Medicine:  
Call your doctor right away if you notice any of these side effects: · Allergic reaction: Itching or hives, swelling in your face or hands, swelling or tingling in your mouth or throat, chest tightness, trouble breathing · Chest pain · Dry mouth, increased thirst, muscle cramps, nausea or vomiting · Fainting, dizziness, lightheadedness · Fast, slow, or irregular heartbeat · Rapid weight gain, swelling in your hands, feet, or ankles, trouble breathing If you notice these less serious side effects, talk with your doctor: · Diarrhea · Increased sweating · Tiredness or weakness If you notice other side effects that you think are caused by this medicine, tell your doctor. Call your doctor for medical advice about side effects. You may report side effects to FDA at 8-529-FHF-4350 © 2016 [address] is for End User's use only and may not be sold, redistributed or otherwise used for commercial purposes. The above information is an  only. It is not intended as medical advice for individual conditions or treatments. Talk to your doctor, nurse or pharmacist before following any medical regimen to see if it is safe and effective for you. Discharge Orders None [employer] Announcement We are excited to announce that we are making your provider's discharge notes available to you in [employer]. You will see these notes when they are completed and signed by the physician that discharged you from your recent hospital stay. If you have any questions or concerns about any information you see in [employer], please call the Health Information Department where you were seen or reach out to your Primary Care Provider for more information about your plan of care. Introducing [facility]! Gracie Escobar introduces [employer] patient portal. Now you can access parts of your medical record, email your doctor's office, and request medication refills online. 1. In your internet browser, go to [email or web address] 2. Click on the First Time User? Click Here link in the Sign In box. You will see the New Member Sign Up page. 3. Enter your Admitly Access Code exactly as it appears below. You will not need to use this code after youve completed the sign-up process. If you do not sign up before the expiration date, you must request a new code. · Admitly Access Code: [email or web address] Expires: 3/27/2017  2:21 PM 
 
4. Enter the last four digits of your Social Security Number (xxxx) and Date of Birth (mm/dd/yyyy) as indicated and click Submit. You will be taken to the next sign-up page. 5. Create a Admitly ID. This will be your Admitly login ID and cannot be changed, so think of one that is secure and easy to remember. 6. Create a Admitly password. You can change your password at any time. 7. Enter your Password Reset Question and Answer. This can be used at a later time if you forget your password. 8. Enter your e-mail address. You will receive e-mail notification when new information is available in [address] 9. Click Sign Up. You can now view and download portions of your medical record. 10. Click the Download Summary menu link to download a portable copy of your medical information. If you have questions, please visit the Frequently Asked Questions section of the Admitly website. Remember, Admitly is NOT to be used for urgent needs. For medical emergencies, dial 911. Now available from your iPhone and Android! General Information Please provide this summary of care documentation to your next provider. Patient Signature:  ____________________________________________________________ Date:  ____________________________________________________________  
  
Boy Luevano Provider Signature:  ____________________________________________________________ Date:  ____________________________________________________________

## 2017-01-12 PROBLEM — A41.9 SEPSIS (HCC): Status: RESOLVED | Noted: 2017-01-11 | Resolved: 2017-01-12

## 2017-01-12 PROBLEM — J15.9 COMMUNITY ACQUIRED BACTERIAL PNEUMONIA: Status: RESOLVED | Noted: 2017-01-11 | Resolved: 2017-01-12

## 2017-01-12 LAB
ALBUMIN SERPL BCP-MCNC: 3.1 G/DL (ref 3.2–4.6)
ALBUMIN/GLOB SERPL: 1.1 {RATIO} (ref 1.2–3.5)
ALP SERPL-CCNC: 72 U/L (ref 50–136)
ALT SERPL-CCNC: 32 U/L (ref 12–65)
ANION GAP BLD CALC-SCNC: 9 MMOL/L (ref 7–16)
AST SERPL W P-5'-P-CCNC: 17 U/L (ref 15–37)
BASOPHILS # BLD AUTO: 0 K/UL (ref 0–0.2)
BASOPHILS # BLD: 0 % (ref 0–2)
BILIRUB SERPL-MCNC: 0.8 MG/DL (ref 0.2–1.1)
BUN SERPL-MCNC: 9 MG/DL (ref 8–23)
CALCIUM SERPL-MCNC: 8.1 MG/DL (ref 8.3–10.4)
CHLORIDE SERPL-SCNC: 108 MMOL/L (ref 98–107)
CO2 SERPL-SCNC: 26 MMOL/L (ref 21–32)
CREAT SERPL-MCNC: 0.77 MG/DL (ref 0.8–1.5)
DIFFERENTIAL METHOD BLD: ABNORMAL
EOSINOPHIL # BLD: 0.1 K/UL (ref 0–0.8)
EOSINOPHIL NFR BLD: 1 % (ref 0.5–7.8)
ERYTHROCYTE [DISTWIDTH] IN BLOOD BY AUTOMATED COUNT: 12.7 % (ref 11.9–14.6)
GLOBULIN SER CALC-MCNC: 2.9 G/DL (ref 2.3–3.5)
GLUCOSE SERPL-MCNC: 85 MG/DL (ref 65–100)
HCT VFR BLD AUTO: 42 % (ref 41.1–50.3)
HGB BLD-MCNC: 14.1 G/DL (ref 13.6–17.2)
IMM GRANULOCYTES # BLD: 0 K/UL (ref 0–0.5)
IMM GRANULOCYTES NFR BLD AUTO: 0.2 % (ref 0–5)
LYMPHOCYTES # BLD AUTO: 16 % (ref 13–44)
LYMPHOCYTES # BLD: 1.7 K/UL (ref 0.5–4.6)
MCH RBC QN AUTO: 33.8 PG (ref 26.1–32.9)
MCHC RBC AUTO-ENTMCNC: 33.6 G/DL (ref 31.4–35)
MCV RBC AUTO: 100.7 FL (ref 79.6–97.8)
MONOCYTES # BLD: 0.7 K/UL (ref 0.1–1.3)
MONOCYTES NFR BLD AUTO: 7 % (ref 4–12)
NEUTS SEG # BLD: 7.8 K/UL (ref 1.7–8.2)
NEUTS SEG NFR BLD AUTO: 76 % (ref 43–78)
PLATELET # BLD AUTO: 200 K/UL (ref 150–450)
PMV BLD AUTO: 10.1 FL (ref 10.8–14.1)
POTASSIUM SERPL-SCNC: 3.5 MMOL/L (ref 3.5–5.1)
PROT SERPL-MCNC: 6 G/DL (ref 6.3–8.2)
RBC # BLD AUTO: 4.17 M/UL (ref 4.23–5.67)
SODIUM SERPL-SCNC: 143 MMOL/L (ref 136–145)
TROPONIN I BLD-MCNC: 0.01 NG/ML (ref 0–0.08)
WBC # BLD AUTO: 10.2 K/UL (ref 4.3–11.1)

## 2017-01-12 PROCEDURE — 36415 COLL VENOUS BLD VENIPUNCTURE: CPT | Performed by: INTERNAL MEDICINE

## 2017-01-12 PROCEDURE — 74011250637 HC RX REV CODE- 250/637: Performed by: INTERNAL MEDICINE

## 2017-01-12 PROCEDURE — 85025 COMPLETE CBC W/AUTO DIFF WBC: CPT | Performed by: INTERNAL MEDICINE

## 2017-01-12 PROCEDURE — 65660000000 HC RM CCU STEPDOWN

## 2017-01-12 PROCEDURE — 94640 AIRWAY INHALATION TREATMENT: CPT

## 2017-01-12 PROCEDURE — 99232 SBSQ HOSP IP/OBS MODERATE 35: CPT | Performed by: INTERNAL MEDICINE

## 2017-01-12 PROCEDURE — 74011000258 HC RX REV CODE- 258: Performed by: INTERNAL MEDICINE

## 2017-01-12 PROCEDURE — 80053 COMPREHEN METABOLIC PANEL: CPT | Performed by: INTERNAL MEDICINE

## 2017-01-12 PROCEDURE — 74011250636 HC RX REV CODE- 250/636: Performed by: INTERNAL MEDICINE

## 2017-01-12 PROCEDURE — 74011000258 HC RX REV CODE- 258: Performed by: EMERGENCY MEDICINE

## 2017-01-12 PROCEDURE — 94760 N-INVAS EAR/PLS OXIMETRY 1: CPT

## 2017-01-12 PROCEDURE — 74011000250 HC RX REV CODE- 250: Performed by: INTERNAL MEDICINE

## 2017-01-12 PROCEDURE — C8929 TTE W OR WO FOL WCON,DOPPLER: HCPCS

## 2017-01-12 PROCEDURE — 74011000250 HC RX REV CODE- 250: Performed by: EMERGENCY MEDICINE

## 2017-01-12 RX ORDER — MAGNESIUM SULFATE 1 G/100ML
1 INJECTION INTRAVENOUS ONCE
Status: COMPLETED | OUTPATIENT
Start: 2017-01-12 | End: 2017-01-14

## 2017-01-12 RX ORDER — METOPROLOL TARTRATE 25 MG/1
25 TABLET, FILM COATED ORAL EVERY 12 HOURS
Status: DISCONTINUED | OUTPATIENT
Start: 2017-01-12 | End: 2017-01-16

## 2017-01-12 RX ORDER — LANOLIN ALCOHOL/MO/W.PET/CERES
400 CREAM (GRAM) TOPICAL DAILY
Status: DISCONTINUED | OUTPATIENT
Start: 2017-01-13 | End: 2017-01-19 | Stop reason: HOSPADM

## 2017-01-12 RX ORDER — LANOLIN ALCOHOL/MO/W.PET/CERES
100 CREAM (GRAM) TOPICAL DAILY
Status: DISCONTINUED | OUTPATIENT
Start: 2017-01-13 | End: 2017-01-19 | Stop reason: HOSPADM

## 2017-01-12 RX ORDER — DICYCLOMINE HYDROCHLORIDE 10 MG/1
10 CAPSULE ORAL 2 TIMES DAILY
COMMUNITY

## 2017-01-12 RX ORDER — FOLIC ACID 1 MG/1
1 TABLET ORAL DAILY
Status: DISCONTINUED | OUTPATIENT
Start: 2017-01-13 | End: 2017-01-19 | Stop reason: HOSPADM

## 2017-01-12 RX ORDER — METOPROLOL TARTRATE 25 MG/1
50 TABLET, FILM COATED ORAL 2 TIMES DAILY
COMMUNITY
End: 2017-01-19

## 2017-01-12 RX ORDER — MELOXICAM 7.5 MG/1
7.5 TABLET ORAL
COMMUNITY
End: 2017-03-30

## 2017-01-12 RX ORDER — CLONAZEPAM 1 MG/1
2 TABLET ORAL 2 TIMES DAILY
Status: DISCONTINUED | OUTPATIENT
Start: 2017-01-12 | End: 2017-01-19 | Stop reason: HOSPADM

## 2017-01-12 RX ORDER — SOTALOL HYDROCHLORIDE 80 MG/1
80 TABLET ORAL DAILY
COMMUNITY
End: 2017-01-19

## 2017-01-12 RX ORDER — SOTALOL HYDROCHLORIDE 80 MG/1
80 TABLET ORAL DAILY
Status: DISCONTINUED | OUTPATIENT
Start: 2017-01-12 | End: 2017-01-16

## 2017-01-12 RX ORDER — DILTIAZEM HYDROCHLORIDE 5 MG/ML
10 INJECTION INTRAVENOUS ONCE
Status: COMPLETED | OUTPATIENT
Start: 2017-01-12 | End: 2017-01-12

## 2017-01-12 RX ADMIN — SOTALOL HYDROCHLORIDE 80 MG: 80 TABLET ORAL at 11:11

## 2017-01-12 RX ADMIN — IPRATROPIUM BROMIDE 0.5 MG: 0.5 SOLUTION RESPIRATORY (INHALATION) at 20:23

## 2017-01-12 RX ADMIN — ZOLPIDEM TARTRATE 10 MG: 5 TABLET, FILM COATED ORAL at 21:27

## 2017-01-12 RX ADMIN — METOPROLOL TARTRATE 25 MG: 25 TABLET ORAL at 21:24

## 2017-01-12 RX ADMIN — SODIUM CHLORIDE 10 MG/HR: 900 INJECTION, SOLUTION INTRAVENOUS at 17:32

## 2017-01-12 RX ADMIN — GABAPENTIN 200 MG: 100 CAPSULE ORAL at 08:30

## 2017-01-12 RX ADMIN — CLONAZEPAM 2 MG: 1 TABLET ORAL at 11:10

## 2017-01-12 RX ADMIN — IPRATROPIUM BROMIDE 0.5 MG: 0.5 SOLUTION RESPIRATORY (INHALATION) at 11:13

## 2017-01-12 RX ADMIN — GABAPENTIN 200 MG: 100 CAPSULE ORAL at 21:24

## 2017-01-12 RX ADMIN — PERFLUTREN 1 ML: 6.52 INJECTION, SUSPENSION INTRAVENOUS at 14:00

## 2017-01-12 RX ADMIN — FENOFIBRATE 160 MG: 160 TABLET ORAL at 08:29

## 2017-01-12 RX ADMIN — GUAIFENESIN 1200 MG: 600 TABLET, EXTENDED RELEASE ORAL at 08:29

## 2017-01-12 RX ADMIN — GUAIFENESIN 1200 MG: 600 TABLET, EXTENDED RELEASE ORAL at 21:24

## 2017-01-12 RX ADMIN — IPRATROPIUM BROMIDE 0.5 MG: 0.5 SOLUTION RESPIRATORY (INHALATION) at 07:18

## 2017-01-12 RX ADMIN — SODIUM CHLORIDE 125 ML/HR: 900 INJECTION, SOLUTION INTRAVENOUS at 08:38

## 2017-01-12 RX ADMIN — ROSUVASTATIN CALCIUM 20 MG: 20 TABLET, FILM COATED ORAL at 21:24

## 2017-01-12 RX ADMIN — SODIUM CHLORIDE 125 ML/HR: 900 INJECTION, SOLUTION INTRAVENOUS at 15:50

## 2017-01-12 RX ADMIN — MAGNESIUM SULFATE HEPTAHYDRATE 1 G: 1 INJECTION, SOLUTION INTRAVENOUS at 19:36

## 2017-01-12 RX ADMIN — Medication 400 MG: at 06:16

## 2017-01-12 RX ADMIN — CLONAZEPAM 2 MG: 1 TABLET ORAL at 17:05

## 2017-01-12 RX ADMIN — SODIUM CHLORIDE 10 MG/HR: 900 INJECTION, SOLUTION INTRAVENOUS at 11:20

## 2017-01-12 RX ADMIN — IPRATROPIUM BROMIDE 0.5 MG: 0.5 SOLUTION RESPIRATORY (INHALATION) at 16:00

## 2017-01-12 RX ADMIN — AZITHROMYCIN MONOHYDRATE 500 MG: 500 INJECTION, POWDER, LYOPHILIZED, FOR SOLUTION INTRAVENOUS at 17:10

## 2017-01-12 RX ADMIN — APIXABAN 5 MG: 5 TABLET, FILM COATED ORAL at 11:11

## 2017-01-12 RX ADMIN — APIXABAN 5 MG: 5 TABLET, FILM COATED ORAL at 17:05

## 2017-01-12 RX ADMIN — DILTIAZEM HYDROCHLORIDE 10 MG: 5 INJECTION INTRAVENOUS at 11:13

## 2017-01-12 RX ADMIN — DULOXETINE HYDROCHLORIDE 120 MG: 60 CAPSULE, DELAYED RELEASE ORAL at 08:29

## 2017-01-12 RX ADMIN — PANTOPRAZOLE SODIUM 40 MG: 40 TABLET, DELAYED RELEASE ORAL at 06:16

## 2017-01-12 RX ADMIN — SODIUM CHLORIDE 5 MG/HR: 900 INJECTION, SOLUTION INTRAVENOUS at 03:30

## 2017-01-12 NOTE — PROGRESS NOTES
Adamaris Hayden  Admission Date: 1/11/2017             Daily Progress Note: 1/12/2017    The patient's chart is reviewed and the patient is discussed with the staff. 64yo WM admitted with sudden dyspnea waking him up early in the morning, found to have a fib with RVR. Initially some question of possible pneumonia. Subjective: Today the patient is on room air. He denies ever having any issues with cough. He has been a febrile. He is on a dilt drip but still has -130's on bedside monitor. Onoing dyspnea but no sensation of palpitations.      Current Facility-Administered Medications   Medication Dose Route Frequency    dilTIAZem (CARDIZEM) 100 mg in 0.9% sodium chloride (MBP/ADV) 100 mL infusion  5 mg/hr IntraVENous CONTINUOUS    DULoxetine (CYMBALTA) capsule 120 mg  120 mg Oral DAILY    fenofibrate (LOFIBRA) tablet 160 mg  160 mg Oral DAILY    fluticasone (FLONASE) 50 mcg/actuation nasal spray 2 Spray  2 Spray Both Nostrils DAILY    gabapentin (NEURONTIN) capsule 200 mg  200 mg Oral BID    guaiFENesin SR (MUCINEX) tablet 1,200 mg  1,200 mg Oral Q12H    magnesium oxide (MAG-OX) tablet 400 mg  400 mg Oral 7am    pantoprazole (PROTONIX) tablet 40 mg  40 mg Oral ACB    rosuvastatin (CRESTOR) tablet 20 mg  20 mg Oral QHS    zolpidem (AMBIEN) tablet 10 mg  10 mg Oral QHS PRN    sodium chloride (NS) flush 5-10 mL  5-10 mL IntraVENous PRN    0.9% sodium chloride infusion  125 mL/hr IntraVENous CONTINUOUS    azithromycin (ZITHROMAX) 500 mg in 0.9% sodium chloride (MBP/ADV) 250 mL  500 mg IntraVENous Q24H    ipratropium (ATROVENT) 0.02 % nebulizer solution 0.5 mg  0.5 mg Nebulization QID RT       Review of Systems  Constitutional: negative for fever, chills, sweats  Cardiovascular: negative for chest pain, palpitations, syncope, edema  Gastrointestinal: negative for dysphagia, reflux, vomiting, diarrhea, abdominal pain, or melena  Neurologic: negative for focal weakness, numbness, headache    Objective:     Vitals:    01/12/17 0010 01/12/17 0456 01/12/17 0722 01/12/17 0908   BP: 132/64 122/75  (!) 137/94   Pulse: 67 93  (!) 110   Resp: 20 20  20   Temp: 96.6 °F (35.9 °C) 97.5 °F (36.4 °C)  97.2 °F (36.2 °C)   SpO2: 94% 94% 95% 97%   Weight:  245 lb (111.1 kg)     Height:         Intake and Output:   01/10 1901 - 01/12 0700  In: 4874.8 [P.O.:500; I.V.:4374.8]  Out: 1800 [Urine:1800]  01/12 0701 - 01/12 1900  In: 480 [P.O.:480]  Out: 515 [Urine:515]    Physical Exam:   Constitution:  the patient is well developed and in no acute distress  EENMT:  Sclera clear, pupils equal, oral mucosa moist  Respiratory: CTA B, no w/r/r. Cardiovascular:  Tachy and irregular. Without M,G,R  Gastrointestinal: soft and non-tender; with positive bowel sounds. Musculoskeletal: warm without cyanosis. There is no lower leg edema. Skin:  no jaundice or rashes, no wounds   Neurologic: no gross neuro deficits     Psychiatric:  alert and oriented x ppt    CHEST XRAY:   1/11/17  There is mild groundglass opacity in the right base. There is no  evidence of dense consolidation, pneumothorax, pleural effusion or pulmonary  edema. The mediastinal and hilar contours are normal given technique. LAB  No lab exists for component: [place]   Recent Labs      01/12/17   0504  01/11/17   1326   WBC  10.2  13.8*   HGB  14.1  15.9   HCT  42.0  46.3   PLT  200  232     Recent Labs      01/12/17   0504  01/11/17   1326   NA  143  142   K  3.5  3.8   CL  108*  103   CO2  26  27   GLU  85  93   BUN  9  12   CREA  0.77*  1.02   MG   --   2.3   CA  8.1*  8.5   PHOS   --   2.2*   ALB  3.1*  3.6   SGOT  17  31   BNPP   --   207     No results for input(s): PH, PCO2, PO2, HCO3 in the last 72 hours.     MICRO  Blood 1/11/17 - ngtd    Assessment:  (Medical Decision Making)     Hospital Problems  Date Reviewed: 12/29/2016          Codes Class Noted POA    * (Principal)Community acquired bacterial pneumonia ICD-10-CM: J15.9  ICD-9-CM: 482.9  1/11/2017 Yes        Sepsis ([facility] ICD-10-CM: A41.9  ICD-9-CM: 038.9, 995.91  1/11/2017 Yes        Atrial fibrillation with RVR (HCC) ICD-10-CM: I48.91  ICD-9-CM: 427.31  1/11/2017 Yes        ISABELLE (obstructive sleep apnea) ICD-10-CM: U70.31  ICD-9-CM: 327.23  1/11/2017 Yes            Patient admitted with dyspnea and a fib with RVR. CXR read as infiltrate in right base. However the patient has no clinical signs of pneumonia and these changes could easily represent atelectasis. A febrile, no cough, room air, WBC and pct normal.     Plan:  (Medical Decision Making)   -will remove sepsis and CAP from his problem list  -d/c ceftriaxone today  -can likely d/c azithro tomorrow as well. May be able to sign off after that  -f/u recs from cardiology.      Patient Active Problem List   Diagnosis Code    COPD (chronic obstructive pulmonary disease) ([facility] J44.9    Pulmonary emphysema (HCC) J43.9    GERD (gastroesophageal reflux disease) K21.9    Pure hypercholesterolemia E78.00    Impotence of organic origin N52.9    Malignant neoplasm of prostate ([facility] [address] acquired bacterial pneumonia J15.9    Sepsis ([facility] A41.9    Atrial fibrillation with RVR ([facility] I48.91    ISABELLE (obstructive sleep apnea) G47.33       Archie Velazquez MD

## 2017-01-12 NOTE — PROGRESS NOTES
Bedside and Verbal shift change report given to Eva Corrales RN (oncoming nurse) by self [facility] ). Report included the following information SBAR, Kardex, MAR and Recent Results.

## 2017-01-12 NOTE — PROGRESS NOTES
Hospitalist Progress Note    Patient: Joelle Allen MRN: [id number]  SSN: [id number]    YOB: 1955  Age: 57 y.o. Sex: male      Admit Date: 1/11/2017    LOS: 1 day     Subjective:     57year old CM with a PMH of COPD and PAF presented to the ER with acute on chronic shortness of breath, multiple syncopal episodes over the past 4 days, and diaphresis with chills. He woke up at 0430 and felt short of breath so he sat on the side of the bed \"with my hands on my knees\". The SOB persisted through the morning so he went to the local fire dept and they took his vital signs and noticed that his HR was in the 140s so he came to the ER.    1/12/16 - This morning the patient is extremely anxious. He states that he did not get his Klonopin so he is \"about to go off the walls\". Denies CP/SOB. No changes in cough. Denies F/C/N/V.       Review of systems negative except stated above. Objective:     Vitals:    01/12/17 0456 01/12/17 0722 01/12/17 0908 01/12/17 1012   BP: 122/75  (!) 137/94 129/79   Pulse: 93  (!) 110    Resp: 20  20    Temp: 97.5 °F (36.4 °C)  97.2 °F (36.2 °C)    SpO2: 94% 95% 97%    Weight: 111.1 kg (245 lb)      Height:            Intake and Output:  Current Shift: 01/12 0701 - 01/12 1900  In: 480 [P.O.:480]  Out: 515 [Urine:515]    Physical Exam:   GENERAL: alert, cooperative, no distress, appears stated age  EYE: conjunctivae/corneas clear. PERRL. THROAT & NECK: normal and no erythema or exudates noted. LUNG: clear to auscultation bilaterally  HEART: irregular rate and rhythm, S1, S2 normal, no murmur, no JVD  ABDOMEN: soft, non-tender, non-distended. Bowel sounds normal.   EXTREMITIES:  normal, no cyanosis or edema, 2+ pedal/radial pulses bilaterally  SKIN: no rash or abnormalities  NEUROLOGIC: A&Ox3. Cranial nerves 2-12 and sensation grossly intact.     Lab/Data Review:  BMP:   Lab Results   Component Value Date/Time     01/12/2017 05:04 AM    K 3.5 01/12/2017 05:04 AM    CL 108 (H) 01/12/2017 05:04 AM    CO2 26 01/12/2017 05:04 AM    AGAP 9 01/12/2017 05:04 AM    GLU 85 01/12/2017 05:04 AM    BUN 9 01/12/2017 05:04 AM    CREA 0.77 (L) 01/12/2017 05:04 AM    GFRAA >60 01/12/2017 05:04 AM    GFRNA >60 01/12/2017 05:04 AM     CBC:   Lab Results   Component Value Date/Time    WBC 10.2 01/12/2017 05:04 AM    HGB 14.1 01/12/2017 05:04 AM    HCT 42.0 01/12/2017 05:04 AM     01/12/2017 05:04 AM     All Cardiac Markers in the last 24 hours:   Lab Results   Component Value Date/Time    TNIPOC 0 01/11/2017 05:59 PM    TNIPOC 0.01 01/11/2017 02:49 PM    BNPP 207 01/11/2017 01:26 PM          Assessment:     Active Problems:    Atrial fibrillation with RVR ([address] (1/11/2017)      ISABELLE (obstructive sleep apnea) (1/11/2017)        Plan:     - Give Cardizem 10mg push. Increase drip to 10mg/hr.  - Await ECHO results  - Add home Sotalol  - Add home Klonopin  - Stop Ceftriaxone. Continue Azithromycin.  - Continue aerosols. Mucinex.   - Await cardiology consult  - Appreciate pulmonary's input and assitance    Signed By: Naseem Roman DO     January 12, 2017

## 2017-01-12 NOTE — CONSULTS
Pt examined and full consult dictated pt in afib with rate 130 140, chest ronchi heart sounds irregular cns no focal defecit,recoomend iv mg sulfate 1 gm.

## 2017-01-12 NOTE — PROGRESS NOTES
Admission skin assessment completed with second RN and reveals the following: skin is warm and dry. Patient has multiple abrasions to BLE/BUE/ right flank and left shoulder. Patient reports \"I have been falling a lot at home\"  Bed alarm placed for safety. Sacrum is intact with no breakdown noted, heels are intact. Clorhex bath given at this time.

## 2017-01-12 NOTE — PROGRESS NOTES
TRANSFER - IN REPORT:    Verbal report received from Anthony Morris, [facility] on [employer] being received from ED for routine progression of care      Report consisted of patients Situation, Background, Assessment and Recommendations(SBAR). Information from the following report(s) SBAR, Kardex, MAR, Recent Results and Cardiac Rhythm A-FIB was reviewed with the receiving nurse. Opportunity for questions and clarification was provided. Assessment completed upon patients arrival to unit and care assumed.

## 2017-01-12 NOTE — ED NOTES
TRANSFER - OUT REPORT:    Verbal report given to Andrew Thorne RN(name) on [employer]  being transferred to 305(unit) for routine progression of care       Report consisted of patients Situation, Background, Assessment and   Recommendations(SBAR). Information from the following report(s) ED Summary was reviewed with the receiving nurse. Lines:   Peripheral IV 01/11/17 Right Antecubital (Active)       Peripheral IV 01/11/17 Right Antecubital (Active)       Peripheral IV 01/11/17 Right Antecubital (Active)        Opportunity for questions and clarification was provided.       Patient transported with:   Patient-specific medications from Pharmacy  Registered Nurse

## 2017-01-12 NOTE — PROGRESS NOTES
Problem: Falls - Risk of  Goal: *Absence of falls  Outcome: Progressing Towards Goal  Pt progressing towards goal. No falls since admission. Bed low and locked. Call light within reach. Side rails x 2. Gripper socks applied. Personal belongings within reach. Pt verbalizes understanding to call for assistance. ALEAH SCORE 4.   BED ALARM PLACED FOR SAFETY CONCERNS AND RECENT FALL AT HOME

## 2017-01-12 NOTE — ED NOTES
Patient self-administered home medications without this RN knowledge at 2030. Will call hospitalist and make aware.    Dicyclomine 10 mg  Metoprolol 50 mg  Diltazem 120 mg  Eliquis 5 mg  Gabapentin 200 mg  Clonazepan 2 mg

## 2017-01-12 NOTE — PROGRESS NOTES
Bedside shift change report received from [place]. RN. Report included the following information SBAR, Kardex, MAR and Recent Results.

## 2017-01-12 NOTE — PROGRESS NOTES
Spoke with Dr. Lamar Silver hospitalist about patient's HR sustaining 120's. Will continue to monitor.

## 2017-01-12 NOTE — CONSULTS
[address]       Name:  Leelee Gonsalez   MR#:  [id number]   :  1955   Account #:  [de-identified]   Date of Adm:  2017       CARDIOLOGY CONSULTATION    HISTORY OF PRESENT ILLNESS: The patient known to me, known   history of atrial fibrillation. He was admitted to the hospital   with pneumonia and AFib. PAST MEDICAL HISTORY: The patient has a long history of   obstructive sleep apnea. He uses a CPAP. Atrial fibrillation. He   was using Cardizem 60 mg 4 times a day at home, sotalol 80 mg   daily, metoprolol 50 twice a day, Eliquis 5 mg twice a day. He has   a history of pulmonary embolus has albuterol nebulizer at   home. He also has GERD. He will use Protonix. Chronic   obstructive disease, using inhalers at home. He got yesterday   very sick, having palpitations, shortness of breath. He was   evaluated in the ER and admitted by the hospitalist with   pneumonia and atrial fib with fast ventricular response. PAST MEDICAL HISTORY: Significant for chronic obstructive airway   disease, hypertension, history of prostate cancer, history of   chronic bronchitis, history of alcohol use in the past and has   not taken alcohol in the last 3 weeks. He was using thiamine 100   mg at home. I will strongly recommend that he should take   magnesium in the hospital and I will give him magnesium sulfate   1 gram IV during this hospital admission, put him on magnesium   oxide once a day. He has a nuclear stress test, cardiac   catheterization in the past which showed no evidence of coronary   artery disease. He has concentric LVH, diastolic dysfunction. He   has recent echo with global ejection fraction of 55%. No evidence   of thrombus on echo done in the office. The patient is feeling   better. Denies any chest pain. He does have palpitations. Has   atrial fib rate of 130. PHYSICAL EXAMINATION   GENERAL: He is alert and oriented x3. HEENT: Pupils equal and reactive.    HEART: Rate is irregular, heart rate is about 130-140. CHEST: Rhonchi. ABDOMEN: Soft. No palpable masses. NEUROLOGIC: No focal deficits. MUSCULOSKELETAL SYSTEM: Unremarkable. ALLERGIES: HE HAS NO KNOWN ALLERGIES. CLINICAL IMPRESSION: Atrial fibrillation with fast ventricular   response. Recommend IV magnesium sulfate 1 gram and then   magnesium oxide 400, sotalol 40, Cardizem IV drip at 10 mg per   minute. Continue IV until his rate is controlled then switch   over to Cardizem 60 p.o. q.6 hours after the heart rate is   controlled. I will continue his sotalol. I want to give him   thiamine 100 mg because he was using alcohol in the past and   still gets little tremors. I do not want him to have   withdrawal. I would continue his inhalers for his chronic   obstructive pulmonary disease and CPAP for his sleep apnea. For   his pneumonia, he is taking doxycycline. He is taking   fenofibrate for his hyperlipidemia, albuterol inhalers,   diltiazem IV, Spiriva for his COPD, for prednisone 20 mg for   his COPD. That was his medication before he got to the hospital.   He was taking metoprolol 25 twice a day. His white cell count   this hospital admission, 10.2. His chloride is normal.   Creatinine is normal. GFR is 60. atrial fibrillation chronic   with fast ventricular, so I agree with IV Cardizem, IV   magnesium, sotalol. For COPD agree with current management. For   his pneumonia, he is taking doxycycline as he was as an   outpatient. For his obstructive sleep apnea, he was using a CPAP   machine. I will continue to follow with you. If you have any   questions, call me on [phone]. For his atrial fibrillation, I   will continue with Cardizem and sotalol. He was on small dose of   metoprolol, which he had hyperadrenergic drive. I would not have   any hesitation adding metoprolol 25 twice a day in addition to   sotalol while he is in the hospital. That has always helped   him.  He did take some alcohol in the past and had withdrawal and   he has very high adrenergic drive, so I would strongly   recommend small dose of metoprolol in addition to sotalol to   continue control his rate. That is what I was doing at outpatient. If you have any questions, please call me on [phone]. I will   continue to follow with you. Thank you for your consult.         Vikram Jenkins MD      NRS / TB   D:  01/12/2017   11:41   T:  01/12/2017   13:11   Job #:  [id number]

## 2017-01-13 PROBLEM — G47.33 OSA (OBSTRUCTIVE SLEEP APNEA): Chronic | Status: ACTIVE | Noted: 2017-01-11

## 2017-01-13 LAB
ALBUMIN SERPL BCP-MCNC: 3.3 G/DL (ref 3.2–4.6)
ALBUMIN/GLOB SERPL: 1 {RATIO} (ref 1.2–3.5)
ALP SERPL-CCNC: 84 U/L (ref 50–136)
ALT SERPL-CCNC: 29 U/L (ref 12–65)
ANION GAP BLD CALC-SCNC: 10 MMOL/L (ref 7–16)
AST SERPL W P-5'-P-CCNC: 21 U/L (ref 15–37)
BASOPHILS # BLD AUTO: 0 K/UL (ref 0–0.2)
BASOPHILS # BLD: 0 % (ref 0–2)
BILIRUB SERPL-MCNC: 0.7 MG/DL (ref 0.2–1.1)
BUN SERPL-MCNC: 10 MG/DL (ref 8–23)
CALCIUM SERPL-MCNC: 8.4 MG/DL (ref 8.3–10.4)
CHLORIDE SERPL-SCNC: 108 MMOL/L (ref 98–107)
CO2 SERPL-SCNC: 24 MMOL/L (ref 21–32)
CREAT SERPL-MCNC: 1 MG/DL (ref 0.8–1.5)
DIFFERENTIAL METHOD BLD: ABNORMAL
EOSINOPHIL # BLD: 0.1 K/UL (ref 0–0.8)
EOSINOPHIL NFR BLD: 1 % (ref 0.5–7.8)
ERYTHROCYTE [DISTWIDTH] IN BLOOD BY AUTOMATED COUNT: 12.8 % (ref 11.9–14.6)
GLOBULIN SER CALC-MCNC: 3.3 G/DL (ref 2.3–3.5)
GLUCOSE SERPL-MCNC: 96 MG/DL (ref 65–100)
HCT VFR BLD AUTO: 46.4 % (ref 41.1–50.3)
HGB BLD-MCNC: 15.7 G/DL (ref 13.6–17.2)
IMM GRANULOCYTES # BLD: 0 K/UL (ref 0–0.5)
IMM GRANULOCYTES NFR BLD AUTO: 0.3 % (ref 0–5)
LYMPHOCYTES # BLD AUTO: 19 % (ref 13–44)
LYMPHOCYTES # BLD: 1.4 K/UL (ref 0.5–4.6)
MAGNESIUM SERPL-MCNC: 2.5 MG/DL (ref 1.8–2.4)
MCH RBC QN AUTO: 33.9 PG (ref 26.1–32.9)
MCHC RBC AUTO-ENTMCNC: 33.8 G/DL (ref 31.4–35)
MCV RBC AUTO: 100.2 FL (ref 79.6–97.8)
MONOCYTES # BLD: 0.5 K/UL (ref 0.1–1.3)
MONOCYTES NFR BLD AUTO: 7 % (ref 4–12)
NEUTS SEG # BLD: 5.6 K/UL (ref 1.7–8.2)
NEUTS SEG NFR BLD AUTO: 73 % (ref 43–78)
PLATELET # BLD AUTO: 200 K/UL (ref 150–450)
PMV BLD AUTO: 10.2 FL (ref 10.8–14.1)
POTASSIUM SERPL-SCNC: 3.8 MMOL/L (ref 3.5–5.1)
PROT SERPL-MCNC: 6.6 G/DL (ref 6.3–8.2)
RBC # BLD AUTO: 4.63 M/UL (ref 4.23–5.67)
SODIUM SERPL-SCNC: 142 MMOL/L (ref 136–145)
WBC # BLD AUTO: 7.7 K/UL (ref 4.3–11.1)

## 2017-01-13 PROCEDURE — 74011000250 HC RX REV CODE- 250: Performed by: NURSE PRACTITIONER

## 2017-01-13 PROCEDURE — 74011250637 HC RX REV CODE- 250/637: Performed by: INTERNAL MEDICINE

## 2017-01-13 PROCEDURE — 94760 N-INVAS EAR/PLS OXIMETRY 1: CPT

## 2017-01-13 PROCEDURE — 36415 COLL VENOUS BLD VENIPUNCTURE: CPT | Performed by: INTERNAL MEDICINE

## 2017-01-13 PROCEDURE — 83735 ASSAY OF MAGNESIUM: CPT | Performed by: INTERNAL MEDICINE

## 2017-01-13 PROCEDURE — 94640 AIRWAY INHALATION TREATMENT: CPT

## 2017-01-13 PROCEDURE — 65660000000 HC RM CCU STEPDOWN

## 2017-01-13 PROCEDURE — 85025 COMPLETE CBC W/AUTO DIFF WBC: CPT | Performed by: INTERNAL MEDICINE

## 2017-01-13 PROCEDURE — 80053 COMPREHEN METABOLIC PANEL: CPT | Performed by: INTERNAL MEDICINE

## 2017-01-13 PROCEDURE — 74011000250 HC RX REV CODE- 250: Performed by: INTERNAL MEDICINE

## 2017-01-13 PROCEDURE — 74011000258 HC RX REV CODE- 258: Performed by: INTERNAL MEDICINE

## 2017-01-13 PROCEDURE — 74011250636 HC RX REV CODE- 250/636: Performed by: INTERNAL MEDICINE

## 2017-01-13 PROCEDURE — 99232 SBSQ HOSP IP/OBS MODERATE 35: CPT | Performed by: INTERNAL MEDICINE

## 2017-01-13 RX ORDER — LEVALBUTEROL INHALATION SOLUTION 0.63 MG/3ML
0.63 SOLUTION RESPIRATORY (INHALATION)
Status: DISCONTINUED | OUTPATIENT
Start: 2017-01-13 | End: 2017-01-19 | Stop reason: HOSPADM

## 2017-01-13 RX ORDER — BUDESONIDE 0.5 MG/2ML
500 INHALANT ORAL
Status: DISCONTINUED | OUTPATIENT
Start: 2017-01-13 | End: 2017-01-19 | Stop reason: HOSPADM

## 2017-01-13 RX ORDER — MAGNESIUM SULFATE 1 G/100ML
1 INJECTION INTRAVENOUS ONCE
Status: COMPLETED | OUTPATIENT
Start: 2017-01-13 | End: 2017-01-14

## 2017-01-13 RX ORDER — DILTIAZEM HYDROCHLORIDE 5 MG/ML
10 INJECTION INTRAVENOUS ONCE
Status: COMPLETED | OUTPATIENT
Start: 2017-01-13 | End: 2017-01-13

## 2017-01-13 RX ORDER — SODIUM CHLORIDE 9 MG/ML
75 INJECTION, SOLUTION INTRAVENOUS CONTINUOUS
Status: DISCONTINUED | OUTPATIENT
Start: 2017-01-13 | End: 2017-01-19 | Stop reason: HOSPADM

## 2017-01-13 RX ORDER — BUDESONIDE 0.5 MG/2ML
500 INHALANT ORAL
Status: DISCONTINUED | OUTPATIENT
Start: 2017-01-13 | End: 2017-01-13

## 2017-01-13 RX ORDER — IPRATROPIUM BROMIDE 0.5 MG/2.5ML
0.5 SOLUTION RESPIRATORY (INHALATION)
Status: DISCONTINUED | OUTPATIENT
Start: 2017-01-13 | End: 2017-01-19 | Stop reason: HOSPADM

## 2017-01-13 RX ADMIN — DILTIAZEM HYDROCHLORIDE 10 MG: 5 INJECTION INTRAVENOUS at 10:15

## 2017-01-13 RX ADMIN — Medication 400 MG: at 06:25

## 2017-01-13 RX ADMIN — GABAPENTIN 200 MG: 100 CAPSULE ORAL at 09:00

## 2017-01-13 RX ADMIN — METOPROLOL TARTRATE 25 MG: 25 TABLET ORAL at 08:59

## 2017-01-13 RX ADMIN — SODIUM CHLORIDE 125 ML/HR: 900 INJECTION, SOLUTION INTRAVENOUS at 02:33

## 2017-01-13 RX ADMIN — LEVALBUTEROL HYDROCHLORIDE 0.63 MG: 0.63 SOLUTION RESPIRATORY (INHALATION) at 13:01

## 2017-01-13 RX ADMIN — ROSUVASTATIN CALCIUM 20 MG: 20 TABLET, FILM COATED ORAL at 21:37

## 2017-01-13 RX ADMIN — GUAIFENESIN 1200 MG: 600 TABLET, EXTENDED RELEASE ORAL at 09:00

## 2017-01-13 RX ADMIN — ZOLPIDEM TARTRATE 10 MG: 5 TABLET, FILM COATED ORAL at 21:37

## 2017-01-13 RX ADMIN — APIXABAN 5 MG: 5 TABLET, FILM COATED ORAL at 17:42

## 2017-01-13 RX ADMIN — SODIUM CHLORIDE 75 ML/HR: 900 INJECTION, SOLUTION INTRAVENOUS at 13:15

## 2017-01-13 RX ADMIN — IPRATROPIUM BROMIDE 0.5 MG: 0.5 SOLUTION RESPIRATORY (INHALATION) at 13:01

## 2017-01-13 RX ADMIN — FOLIC ACID 1 MG: 1 TABLET ORAL at 08:59

## 2017-01-13 RX ADMIN — SODIUM CHLORIDE 15 MG/HR: 900 INJECTION, SOLUTION INTRAVENOUS at 10:18

## 2017-01-13 RX ADMIN — DULOXETINE HYDROCHLORIDE 120 MG: 60 CAPSULE, DELAYED RELEASE ORAL at 09:00

## 2017-01-13 RX ADMIN — CLONAZEPAM 2 MG: 1 TABLET ORAL at 21:37

## 2017-01-13 RX ADMIN — Medication 100 MG: at 09:11

## 2017-01-13 RX ADMIN — FENOFIBRATE 160 MG: 160 TABLET ORAL at 08:59

## 2017-01-13 RX ADMIN — SODIUM CHLORIDE 15 MG/HR: 900 INJECTION, SOLUTION INTRAVENOUS at 16:55

## 2017-01-13 RX ADMIN — GABAPENTIN 200 MG: 100 CAPSULE ORAL at 21:38

## 2017-01-13 RX ADMIN — CLONAZEPAM 2 MG: 1 TABLET ORAL at 09:00

## 2017-01-13 RX ADMIN — IPRATROPIUM BROMIDE 0.5 MG: 0.5 SOLUTION RESPIRATORY (INHALATION) at 08:12

## 2017-01-13 RX ADMIN — LEVALBUTEROL HYDROCHLORIDE 0.63 MG: 0.63 SOLUTION RESPIRATORY (INHALATION) at 19:42

## 2017-01-13 RX ADMIN — Medication 400 MG: at 11:45

## 2017-01-13 RX ADMIN — SOTALOL HYDROCHLORIDE 80 MG: 80 TABLET ORAL at 08:59

## 2017-01-13 RX ADMIN — BUDESONIDE 500 MCG: 0.5 INHALANT RESPIRATORY (INHALATION) at 19:42

## 2017-01-13 RX ADMIN — METOPROLOL TARTRATE 25 MG: 25 TABLET ORAL at 21:38

## 2017-01-13 RX ADMIN — APIXABAN 5 MG: 5 TABLET, FILM COATED ORAL at 08:58

## 2017-01-13 RX ADMIN — MAGNESIUM SULFATE HEPTAHYDRATE 1 G: 1 INJECTION, SOLUTION INTRAVENOUS at 13:23

## 2017-01-13 RX ADMIN — SODIUM CHLORIDE 10 MG/HR: 900 INJECTION, SOLUTION INTRAVENOUS at 02:33

## 2017-01-13 RX ADMIN — IPRATROPIUM BROMIDE 0.5 MG: 0.5 SOLUTION RESPIRATORY (INHALATION) at 19:42

## 2017-01-13 RX ADMIN — GUAIFENESIN 1200 MG: 600 TABLET, EXTENDED RELEASE ORAL at 21:37

## 2017-01-13 RX ADMIN — PANTOPRAZOLE SODIUM 40 MG: 40 TABLET, DELAYED RELEASE ORAL at 06:26

## 2017-01-13 NOTE — PROGRESS NOTES
Hospitalist Progress Note    Patient: Yanira Weiner MRN: [id number]  SSN: [id number]    YOB: 1955  Age: 57 y.o. Sex: male      Admit Date: 1/11/2017    LOS: 2 days     Subjective:     57year old CM with a PMH of COPD and PAF presented to the ER with acute on chronic shortness of breath, multiple syncopal episodes over the past 4 days, and diaphresis with chills. He woke up at 0430 and felt short of breath so he sat on the side of the bed \"with my hands on my knees\". The SOB persisted through the morning so he went to the local fire dept and they took his vital signs and noticed that his HR was in the 140s so he came to the ER.    1/13/16 - Today the patient feels better. Had some anxiety last evening. Denies CP/SOB. No changes in cough. Denies F/C/N/V.       Review of systems negative except stated above. Objective:     Vitals:    01/13/17 0718 01/13/17 0825 01/13/17 0859 01/13/17 1015   BP: 126/77  126/81 128/80   Pulse: (!) 108  (!) 120 (!) 133   Resp: 17      Temp: 97.4 °F (36.3 °C)      SpO2: 96% 98%     Weight:       Height:            Intake and Output:  Current Shift: 01/13 0701 - 01/13 1900  In: 629.2 [I.V.:629.2]  Out: 650 [Urine:650]    Physical Exam:   GENERAL: alert, cooperative, no distress, appears stated age  EYE: conjunctivae/corneas clear. PERRL. THROAT & NECK: normal and no erythema or exudates noted. LUNG: clear to auscultation bilaterally  HEART: irregular rate and rhythm, S1, S2 normal, no murmur, no JVD  ABDOMEN: soft, non-tender, non-distended. Bowel sounds normal.   EXTREMITIES:  normal, no cyanosis or edema, 2+ pedal/radial pulses bilaterally  SKIN: no rash or abnormalities  NEUROLOGIC: A&Ox3. Cranial nerves 2-12 and sensation grossly intact.     Lab/Data Review:  BMP:   Lab Results   Component Value Date/Time     01/13/2017 05:44 AM    K 3.8 01/13/2017 05:44 AM     (H) 01/13/2017 05:44 AM    CO2 24 01/13/2017 05:44 AM    AGAP 10 01/13/2017 05:44 AM GLU 96 01/13/2017 05:44 AM    BUN 10 01/13/2017 05:44 AM    CREA 1.00 01/13/2017 05:44 AM    GFRAA >60 01/13/2017 05:44 AM    GFRNA >60 01/13/2017 05:44 AM     CBC:   Lab Results   Component Value Date/Time    WBC 7.7 01/13/2017 05:44 AM    HGB 15.7 01/13/2017 05:44 AM    HCT 46.4 01/13/2017 05:44 AM     01/13/2017 05:44 AM     All Cardiac Markers in the last 24 hours:   No results found for: CPK, CKMMB, CKMB, RCK3, CKMBT, CKNDX, CKND1, VESTA, TROPT, TROIQ, MASHA, TROPT, TNIPOC, BNP, BNPP       Assessment:     Active Problems:    Atrial fibrillation with RVR (HCC) (1/11/2017)      ISABELLE (obstructive sleep apnea) (1/11/2017)        Plan:     - Give Cardizem 10mg push. Increase drip to 15mg/hr.  - Consult EP due to persistent/resistant Afib on multiple medications  - Continue Sotalol  - Continue Klonopin  - Stop Ceftriaxone. Continue Azithromycin.  - Continue aerosols. Mucinex.   - Appreciate cardiology & pulmonary's input and assistance    Signed By: Marleny Navarro DO     January 13, 2017

## 2017-01-13 NOTE — PROGRESS NOTES
Zunilda Rodriguez  Admission Date: 1/11/2017             Daily Progress Note: 1/13/2017    The patient's chart is reviewed and the patient is discussed with the staff. 58 yo male with a history of COPD / emphysema, ISABELLE maintained on CPAP, GERD, prostate CA, and HL. He is followed by Dr. Vera Varela as an outpatient. He presented to the ER with sob and several month hx of intermittent subjective fevers and chills. He was found to be hypertensive and in a.fib with RVR. He was started on abx for possible PNA but CXR was without infiltrate and there were no clinical signs of infection (afebrile, WBC 7.7, PCT <0.1). Abx were tapered / discontinued    Subjective:     Currently on RA with O2 sat 98%. Denies cough or mucus production. Denies chest pain or palpitations - HR remains elevated in the 130-140s. Denies n/v/d.       Current Facility-Administered Medications   Medication Dose Route Frequency    apixaban (ELIQUIS) tablet 5 mg  5 mg Oral BID    clonazePAM (KlonoPIN) tablet 2 mg  2 mg Oral BID    dilTIAZem (CARDIZEM) 100 mg in 0.9% sodium chloride (MBP/ADV) 100 mL infusion  10 mg/hr IntraVENous CONTINUOUS    sotalol (BETAPACE) tablet 80 mg  80 mg Oral DAILY    metoprolol tartrate (LOPRESSOR) tablet 25 mg  25 mg Oral Q12H    magnesium oxide (MAG-OX) tablet 400 mg  400 mg Oral DAILY    thiamine (B-1) tablet 100 mg  100 mg Oral DAILY    folic acid (FOLVITE) tablet 1 mg  1 mg Oral DAILY    DULoxetine (CYMBALTA) capsule 120 mg  120 mg Oral DAILY    fenofibrate (LOFIBRA) tablet 160 mg  160 mg Oral DAILY    fluticasone (FLONASE) 50 mcg/actuation nasal spray 2 Spray  2 Spray Both Nostrils DAILY    gabapentin (NEURONTIN) capsule 200 mg  200 mg Oral BID    guaiFENesin SR (MUCINEX) tablet 1,200 mg  1,200 mg Oral Q12H    magnesium oxide (MAG-OX) tablet 400 mg  400 mg Oral 7am    pantoprazole (PROTONIX) tablet 40 mg  40 mg Oral ACB    rosuvastatin (CRESTOR) tablet 20 mg  20 mg Oral QHS    zolpidem (AMBIEN) tablet 10 mg  10 mg Oral QHS PRN    sodium chloride (NS) flush 5-10 mL  5-10 mL IntraVENous PRN    0.9% sodium chloride infusion  125 mL/hr IntraVENous CONTINUOUS    azithromycin (ZITHROMAX) 500 mg in 0.9% sodium chloride (MBP/ADV) 250 mL  500 mg IntraVENous Q24H    ipratropium (ATROVENT) 0.02 % nebulizer solution 0.5 mg  0.5 mg Nebulization QID RT       Review of Systems  Constitutional: negative for fever, chills, sweats  Cardiovascular: negative for chest pain, palpitations, syncope, edema  Gastrointestinal:  negative for dysphagia, reflux, vomiting, diarrhea, abdominal pain, or melena  Neurologic:  negative for focal weakness, numbness, headache    Objective:     Vitals:    01/13/17 0103 01/13/17 0514 01/13/17 0718 01/13/17 0825   BP: 122/85 119/79 126/77    Pulse: (!) 101 (!) 101 (!) 108    Resp: 19 20 17    Temp: 97.9 °F (36.6 °C) 98.1 °F (36.7 °C) 97.4 °F (36.3 °C)    SpO2: 95% 96% 96% 98%   Weight:  240 lb 8 oz (109.1 kg)     Height:         Intake and Output:   01/11 1901 - 01/13 0700  In: 9355.5 [P.O.:1670; I.V.:7685.5]  Out: 3102 [Urine:4965]       Physical Exam:   Constitution:  the patient is well developed and in no acute distress  EENMT:  Sclera clear, pupils equal, oral mucosa moist  Respiratory: clear to auscultation bilaterally, RA  Cardiovascular:  iRRR without M,G,R - tachy  Gastrointestinal: soft and non-tender; with positive bowel sounds. Musculoskeletal: warm without cyanosis. There is no lower leg edema.   Skin:  no jaundice or rashes, no open wounds   Neurologic: no gross neuro deficits     Psychiatric:  alert and oriented x 3    CHEST XRAY:   1/11        LAB   Recent Labs      01/13/17   0544  01/12/17   0504  01/11/17   1326   WBC  7.7  10.2  13.8*   HGB  15.7  14.1  15.9   HCT  46.4  42.0  46.3   PLT  200  200  232     Recent Labs      01/13/17   0544  01/12/17   0504  01/11/17   1326   NA  142  143  142   K  3.8  3.5  3.8   CL  108*  108*  103   CO2  24  26  27 GLU  96  85  93   BUN  10  9  12   CREA  1.00  0.77*  1.02   MG   --    --   2.3   CA  8.4  8.1*  8.5   PHOS   --    --   2.2*   ALB  3.3  3.1*  3.6   TBILI  0.7  0.8  1.0   ALT  29  32  47   SGOT  21  17  31   BNPP   --    --   207       Assessment:  (Medical Decision Making)     Hospital Problems  Date Reviewed: 1/13/2017          Codes Class Noted POA    Atrial fibrillation with RVR ([address] ICD-10-CM: I48.91  ICD-9-CM: 427.31  1/11/2017 Yes    HR remains elevated in the 130-140s      ISABELLE (obstructive sleep apnea) (Chronic) ICD-10-CM: U00.90  ICD-9-CM: 327.23  1/11/2017 Yes              Plan:  (Medical Decision Making)     --Zithromax - discontinue with no s/s of infection  --mucinex  --Atrovent >>> add xopenex / pulmicort  --increase pressure on CPAP  --rate control - HR remains in 130-140s  --consider EP consult for possible ablation    More than 50% of the time documented was spent in face-to-face contact with the patient and in the care of the patient on the floor/unit where the patient is located. Zunilda Guerra NP      Lungs: decreased sounds in bases. Heart: noted irregularly irregular with HR up to 140'  Other     HR out of control for over a year. Will recommend he see EP since reported on multiple agents that have not been successful. On CPAP and does not feel the pressure is enough. He is on a CPAP of 9. Told respiratory to go up to 11 for now. Also does not like inhalers, since when tachypnea denton not feel he is getting enough meds. Loves the nebulizers and will send him on home on nebs, when ready. All questions answered for him and his wife. I have spoken with and examined the patient. I have reviewed the history, examination, assessment, and plan and agree with the above. Lawrence Garner MD      This note was signed electronically. Errors are unfortunately her likely due to dictation software.

## 2017-01-13 NOTE — PROGRESS NOTES
Bedside and Verbal shift change report given to Meme Sinclair RN (oncoming nurse) by self (offgoing nurse). Report included the following information SBAR, Kardex, MAR and Recent Results.

## 2017-01-13 NOTE — PROGRESS NOTES
Was called for EP Consult for A. Fib management, chart reviewed. Patient is followed by Dr. Emma Panda and seen yesterday in consult by him. If an EP consult is needed will need to be ordered by Dr. Emma Panda.

## 2017-01-13 NOTE — PROGRESS NOTES
Bedside and Verbal shift change report given to self (oncoming nurse) by Rossi Connors RN (offgoing nurse). Report included the following information SBAR, Kardex, MAR and Recent Results.

## 2017-01-13 NOTE — PROGRESS NOTES
Wife and pt don't understand why we are treating him with cardizem GTT if it is not working and both were inquiring about other treatment modalities.

## 2017-01-13 NOTE — PROGRESS NOTES
PROGRESS NOTE    Called and discussed updates with Dr. Julius Galicia. He does not feel a need for an EP consult at this point due to him knowing the patient for 15 years. He agreed with bolusing Cardizem and uptitrating the Cardizem gtt. Add Magnesium Sulfate 1g IV daily. Start normal saline @ 75 ml/hr. All per Dr. Victorina Hurley recommendations.       Nicole Heller,

## 2017-01-13 NOTE — PROGRESS NOTES
Pt found sitting on side of bed eating lunch. HR noted to fluctuate between 110 and 140 while sitting on side of bed.

## 2017-01-13 NOTE — PROGRESS NOTES
Bedside shift change report given to Joel Zapien RN. Report included the following information SBAR, Kardex, MAR and Recent Results.

## 2017-01-14 LAB
ALBUMIN SERPL BCP-MCNC: 3.3 G/DL (ref 3.2–4.6)
ALBUMIN/GLOB SERPL: 1 {RATIO} (ref 1.2–3.5)
ALP SERPL-CCNC: 80 U/L (ref 50–136)
ALT SERPL-CCNC: 25 U/L (ref 12–65)
ANION GAP BLD CALC-SCNC: 12 MMOL/L (ref 7–16)
AST SERPL W P-5'-P-CCNC: 18 U/L (ref 15–37)
BASOPHILS # BLD AUTO: 0 K/UL (ref 0–0.2)
BASOPHILS # BLD: 0 % (ref 0–2)
BILIRUB SERPL-MCNC: 0.7 MG/DL (ref 0.2–1.1)
BUN SERPL-MCNC: 11 MG/DL (ref 8–23)
CALCIUM SERPL-MCNC: 8.8 MG/DL (ref 8.3–10.4)
CHLORIDE SERPL-SCNC: 107 MMOL/L (ref 98–107)
CO2 SERPL-SCNC: 24 MMOL/L (ref 21–32)
CREAT SERPL-MCNC: 0.98 MG/DL (ref 0.8–1.5)
DIFFERENTIAL METHOD BLD: ABNORMAL
EOSINOPHIL # BLD: 0.1 K/UL (ref 0–0.8)
EOSINOPHIL NFR BLD: 1 % (ref 0.5–7.8)
ERYTHROCYTE [DISTWIDTH] IN BLOOD BY AUTOMATED COUNT: 12.9 % (ref 11.9–14.6)
GLOBULIN SER CALC-MCNC: 3.3 G/DL (ref 2.3–3.5)
GLUCOSE SERPL-MCNC: 95 MG/DL (ref 65–100)
HCT VFR BLD AUTO: 46.3 % (ref 41.1–50.3)
HGB BLD-MCNC: 15.5 G/DL (ref 13.6–17.2)
IMM GRANULOCYTES # BLD: 0 K/UL (ref 0–0.5)
IMM GRANULOCYTES NFR BLD AUTO: 0.2 % (ref 0–5)
LYMPHOCYTES # BLD AUTO: 17 % (ref 13–44)
LYMPHOCYTES # BLD: 1.4 K/UL (ref 0.5–4.6)
MCH RBC QN AUTO: 33.7 PG (ref 26.1–32.9)
MCHC RBC AUTO-ENTMCNC: 33.5 G/DL (ref 31.4–35)
MCV RBC AUTO: 100.7 FL (ref 79.6–97.8)
MONOCYTES # BLD: 0.6 K/UL (ref 0.1–1.3)
MONOCYTES NFR BLD AUTO: 7 % (ref 4–12)
NEUTS SEG # BLD: 6.2 K/UL (ref 1.7–8.2)
NEUTS SEG NFR BLD AUTO: 75 % (ref 43–78)
PLATELET # BLD AUTO: 189 K/UL (ref 150–450)
PMV BLD AUTO: 10.2 FL (ref 10.8–14.1)
POTASSIUM SERPL-SCNC: 3.9 MMOL/L (ref 3.5–5.1)
PROT SERPL-MCNC: 6.6 G/DL (ref 6.3–8.2)
RBC # BLD AUTO: 4.6 M/UL (ref 4.23–5.67)
SODIUM SERPL-SCNC: 143 MMOL/L (ref 136–145)
WBC # BLD AUTO: 8.3 K/UL (ref 4.3–11.1)

## 2017-01-14 PROCEDURE — 74011000258 HC RX REV CODE- 258: Performed by: INTERNAL MEDICINE

## 2017-01-14 PROCEDURE — 94760 N-INVAS EAR/PLS OXIMETRY 1: CPT

## 2017-01-14 PROCEDURE — 74011250637 HC RX REV CODE- 250/637: Performed by: INTERNAL MEDICINE

## 2017-01-14 PROCEDURE — 74011000250 HC RX REV CODE- 250: Performed by: INTERNAL MEDICINE

## 2017-01-14 PROCEDURE — 65660000000 HC RM CCU STEPDOWN

## 2017-01-14 PROCEDURE — 85025 COMPLETE CBC W/AUTO DIFF WBC: CPT | Performed by: INTERNAL MEDICINE

## 2017-01-14 PROCEDURE — 74011000250 HC RX REV CODE- 250: Performed by: NURSE PRACTITIONER

## 2017-01-14 PROCEDURE — 94640 AIRWAY INHALATION TREATMENT: CPT

## 2017-01-14 PROCEDURE — 74011250636 HC RX REV CODE- 250/636: Performed by: INTERNAL MEDICINE

## 2017-01-14 PROCEDURE — 80053 COMPREHEN METABOLIC PANEL: CPT | Performed by: INTERNAL MEDICINE

## 2017-01-14 PROCEDURE — 99232 SBSQ HOSP IP/OBS MODERATE 35: CPT | Performed by: INTERNAL MEDICINE

## 2017-01-14 PROCEDURE — 36415 COLL VENOUS BLD VENIPUNCTURE: CPT | Performed by: INTERNAL MEDICINE

## 2017-01-14 RX ADMIN — Medication 400 MG: at 08:19

## 2017-01-14 RX ADMIN — IPRATROPIUM BROMIDE 0.5 MG: 0.5 SOLUTION RESPIRATORY (INHALATION) at 19:28

## 2017-01-14 RX ADMIN — METOPROLOL TARTRATE 25 MG: 25 TABLET ORAL at 21:14

## 2017-01-14 RX ADMIN — IPRATROPIUM BROMIDE 0.5 MG: 0.5 SOLUTION RESPIRATORY (INHALATION) at 07:14

## 2017-01-14 RX ADMIN — SODIUM CHLORIDE 15 MG/HR: 900 INJECTION, SOLUTION INTRAVENOUS at 00:08

## 2017-01-14 RX ADMIN — ZOLPIDEM TARTRATE 10 MG: 5 TABLET, FILM COATED ORAL at 21:14

## 2017-01-14 RX ADMIN — SODIUM CHLORIDE 15 MG/HR: 900 INJECTION, SOLUTION INTRAVENOUS at 14:01

## 2017-01-14 RX ADMIN — METOPROLOL TARTRATE 25 MG: 25 TABLET ORAL at 08:19

## 2017-01-14 RX ADMIN — FLUTICASONE PROPIONATE 2 SPRAY: 50 SPRAY, METERED NASAL at 10:37

## 2017-01-14 RX ADMIN — DULOXETINE HYDROCHLORIDE 120 MG: 60 CAPSULE, DELAYED RELEASE ORAL at 08:19

## 2017-01-14 RX ADMIN — LEVALBUTEROL HYDROCHLORIDE 0.63 MG: 0.63 SOLUTION RESPIRATORY (INHALATION) at 15:08

## 2017-01-14 RX ADMIN — Medication 100 MG: at 08:19

## 2017-01-14 RX ADMIN — LEVALBUTEROL HYDROCHLORIDE 0.63 MG: 0.63 SOLUTION RESPIRATORY (INHALATION) at 07:13

## 2017-01-14 RX ADMIN — APIXABAN 5 MG: 5 TABLET, FILM COATED ORAL at 08:18

## 2017-01-14 RX ADMIN — GUAIFENESIN 1200 MG: 600 TABLET, EXTENDED RELEASE ORAL at 08:18

## 2017-01-14 RX ADMIN — FOLIC ACID 1 MG: 1 TABLET ORAL at 08:19

## 2017-01-14 RX ADMIN — PANTOPRAZOLE SODIUM 40 MG: 40 TABLET, DELAYED RELEASE ORAL at 08:18

## 2017-01-14 RX ADMIN — GUAIFENESIN 1200 MG: 600 TABLET, EXTENDED RELEASE ORAL at 21:13

## 2017-01-14 RX ADMIN — Medication 400 MG: at 08:18

## 2017-01-14 RX ADMIN — Medication 10 ML: at 21:14

## 2017-01-14 RX ADMIN — GABAPENTIN 200 MG: 100 CAPSULE ORAL at 08:19

## 2017-01-14 RX ADMIN — SODIUM CHLORIDE 15 MG/HR: 900 INJECTION, SOLUTION INTRAVENOUS at 07:19

## 2017-01-14 RX ADMIN — CLONAZEPAM 2 MG: 1 TABLET ORAL at 21:13

## 2017-01-14 RX ADMIN — LEVALBUTEROL HYDROCHLORIDE 0.63 MG: 0.63 SOLUTION RESPIRATORY (INHALATION) at 19:28

## 2017-01-14 RX ADMIN — IPRATROPIUM BROMIDE 0.5 MG: 0.5 SOLUTION RESPIRATORY (INHALATION) at 15:08

## 2017-01-14 RX ADMIN — BUDESONIDE 500 MCG: 0.5 INHALANT RESPIRATORY (INHALATION) at 19:28

## 2017-01-14 RX ADMIN — APIXABAN 5 MG: 5 TABLET, FILM COATED ORAL at 21:13

## 2017-01-14 RX ADMIN — SODIUM CHLORIDE 75 ML/HR: 900 INJECTION, SOLUTION INTRAVENOUS at 18:11

## 2017-01-14 RX ADMIN — ROSUVASTATIN CALCIUM 20 MG: 20 TABLET, FILM COATED ORAL at 21:13

## 2017-01-14 RX ADMIN — FENOFIBRATE 160 MG: 160 TABLET ORAL at 08:19

## 2017-01-14 RX ADMIN — CLONAZEPAM 2 MG: 1 TABLET ORAL at 08:19

## 2017-01-14 RX ADMIN — SOTALOL HYDROCHLORIDE 80 MG: 80 TABLET ORAL at 08:19

## 2017-01-14 RX ADMIN — GABAPENTIN 200 MG: 100 CAPSULE ORAL at 21:13

## 2017-01-14 RX ADMIN — BUDESONIDE 500 MCG: 0.5 INHALANT RESPIRATORY (INHALATION) at 07:13

## 2017-01-14 RX ADMIN — SODIUM CHLORIDE 75 ML/HR: 900 INJECTION, SOLUTION INTRAVENOUS at 04:58

## 2017-01-14 RX ADMIN — SODIUM CHLORIDE 15 MG/HR: 900 INJECTION, SOLUTION INTRAVENOUS at 19:59

## 2017-01-14 NOTE — PROGRESS NOTES
Shasha Yancey  Admission Date: 1/11/2017             Daily Progress Note: 1/14/2017    The patient's chart is reviewed and the patient is discussed with the staff. 58 yo male with a history of COPD / emphysema, ISABELLE maintained on CPAP, GERD, prostate CA, and HL. He is followed by Dr. Miley Sanchez as an outpatient. He presented to the ER with sob and several month hx of intermittent subjective fevers and chills. He was found to be hypertensive and in a.fib with RVR. He was started on abx for possible PNA but CXR was without infiltrate and there were no clinical signs of infection (afebrile, WBC 7.7, PCT <0.1). Abx were tapered / discontinued    Subjective:     Currently on RA with O2 sat 95%. Denies cough or mucus production. Tolerated increased pressure of CPAP. Denies chest pain or palpitations - HR remains elevated in the 100-130s. Denies n/v/d.       Current Facility-Administered Medications   Medication Dose Route Frequency    levalbuterol (XOPENEX) nebulizer soln 0.63 mg/3 mL  0.63 mg Nebulization Q6H RT    ipratropium (ATROVENT) 0.02 % nebulizer solution 0.5 mg  0.5 mg Nebulization Q6H RT    budesonide (PULMICORT) 500 mcg/2 ml nebulizer suspension  500 mcg Nebulization BID RT    0.9% sodium chloride infusion  75 mL/hr IntraVENous CONTINUOUS    apixaban (ELIQUIS) tablet 5 mg  5 mg Oral BID    clonazePAM (KlonoPIN) tablet 2 mg  2 mg Oral BID    dilTIAZem (CARDIZEM) 100 mg in 0.9% sodium chloride (MBP/ADV) 100 mL infusion  0-15 mg/hr IntraVENous CONTINUOUS    sotalol (BETAPACE) tablet 80 mg  80 mg Oral DAILY    metoprolol tartrate (LOPRESSOR) tablet 25 mg  25 mg Oral Q12H    magnesium oxide (MAG-OX) tablet 400 mg  400 mg Oral DAILY    thiamine (B-1) tablet 100 mg  100 mg Oral DAILY    folic acid (FOLVITE) tablet 1 mg  1 mg Oral DAILY    DULoxetine (CYMBALTA) capsule 120 mg  120 mg Oral DAILY    fenofibrate (LOFIBRA) tablet 160 mg  160 mg Oral DAILY    fluticasone (FLONASE) 50 mcg/actuation nasal spray 2 Spray  2 Spray Both Nostrils DAILY    gabapentin (NEURONTIN) capsule 200 mg  200 mg Oral BID    guaiFENesin SR (MUCINEX) tablet 1,200 mg  1,200 mg Oral Q12H    magnesium oxide (MAG-OX) tablet 400 mg  400 mg Oral 7am    pantoprazole (PROTONIX) tablet 40 mg  40 mg Oral ACB    rosuvastatin (CRESTOR) tablet 20 mg  20 mg Oral QHS    zolpidem (AMBIEN) tablet 10 mg  10 mg Oral QHS PRN    sodium chloride (NS) flush 5-10 mL  5-10 mL IntraVENous PRN       Review of Systems  Constitutional: negative for fever, chills, sweats  Cardiovascular: negative for chest pain, palpitations, syncope, edema  Gastrointestinal:  negative for dysphagia, reflux, vomiting, diarrhea, abdominal pain, or melena  Neurologic:  negative for focal weakness, numbness, headache    Objective:     Vitals:    01/14/17 0036 01/14/17 0515 01/14/17 0714 01/14/17 0745   BP: 132/74 (!) 136/99  128/85   Pulse: 89 (!) 105  (!) 103   Resp: 18 18  18   Temp: 97.9 °F (36.6 °C) 97.7 °F (36.5 °C)  97.5 °F (36.4 °C)   SpO2: 95% 99% 96% 95%   Weight:  240 lb (108.9 kg)     Height:         Intake and Output:   01/12 1901 - 01/14 0700  In: 4389.8 [P.O.:450; I.V.:3939.8]  Out: 4955 [Urine:3175]  01/14 0701 - 01/14 1900  In: -   Out: 650 [Urine:650]    Physical Exam:   Constitution:  the patient is well developed and in no acute distress  EENMT:  Sclera clear, pupils equal, oral mucosa moist  Respiratory: diminished, clear to auscultation bilaterally, RA  Cardiovascular:  iRRR without M,G,R - tachy  Gastrointestinal: soft and non-tender; with positive bowel sounds. Musculoskeletal: warm without cyanosis. There is no lower leg edema.   Skin:  no jaundice or rashes, no open wounds   Neurologic: no gross neuro deficits     Psychiatric:  alert and oriented x 3    CHEST XRAY:   1/11        LAB   Recent Labs      01/14/17   0555  01/13/17   0544  01/12/17   0504  01/11/17   1326   WBC  8.3  7.7  10.2  13.8*   HGB  15.5  15.7  14.1  15.9   HCT 46.3  46.4  42.0  46.3   PLT  189  200  200  232     Recent Labs      01/14/17   0555  01/13/17   0544  01/12/17   0504  01/11/17   1326   NA  143  142  143  142   K  3.9  3.8  3.5  3.8   CL  107  108*  108*  103   CO2  24  24  26  27   GLU  95  96  85  93   BUN  11  10  9  12   CREA  0.98  1.00  0.77*  1.02   MG   --   2.5*   --   2.3   CA  8.8  8.4  8.1*  8.5   PHOS   --    --    --   2.2*   ALB  3.3  3.3  3.1*  3.6   TBILI  0.7  0.7  0.8  1.0   ALT  25  29  32  47   SGOT  18  21  17  31   BNPP   --    --    --   207       Assessment:  (Medical Decision Making)     Hospital Problems  Date Reviewed: 1/13/2017          Codes Class Noted POA    Atrial fibrillation with RVR ([facility] [address] ICD-10-CM: I48.91  ICD-9-CM: 427.31  1/11/2017 Yes    HR remains elevated in the 130-140s  Eliquis   Sotalol / metoprolol / max IV Cardizem / Mag      ISABELLE (obstructive sleep apnea) (Chronic) ICD-10-CM: G47.33  ICD-9-CM: 327.23  1/11/2017 Yes    Pressure increased from 9 to 11 yesterday         Plan:  (Medical Decision Making)     --Flonase  --mucinex  --Atrovent/xopenex - Patient on inhalers as an outpatient and does not feel he is able to inhale adequately enough to get the medications into his lungs. Would like nebulizer for home use  --increase pressure on CPAP from 9-11. Well tolerated by patient. --rate control - mag / lopressor / sotalol -max IV cardizem - HR remains in 100s - patient reports elevated for >1 year. On multiple agents  --EP consult for possible ablation. Primary team discussed EP consult with Dr. Pierre Vernon who does not feel it is necessary at this time. More than 50% of the time documented was spent in face-to-face contact with the patient and in the care of the patient on the floor/unit where the patient is located.     London Arroyo NP     Lungs:  Decreased BS with no wheezes or crackles  Heart:  Tachy iRR with no Murmur/Rubs/Gallops    Additional Comments:  Pt voices that he would like to see another cardiologist at this point since current Rx not effective. Also likes nebs better. Can consider future neb rx after afib controlled. Tolerating higher CPAP pressure better. Can see in office 2-3 weeks after discharge to change to nebs. Nothing to add at this point. Will sign off. I have spoken with and examined the patient. I agree with the above assessment and plan as documented.     Eleanor Steel MD

## 2017-01-14 NOTE — PROGRESS NOTES
Bedside and Verbal shift change report given to RALEIGH Tapia (oncoming nurse) by Tatiana Nevarez RN (offgoing nurse). Report included the following information SBAR, Kardex, Accordion and Recent Results.

## 2017-01-14 NOTE — PROGRESS NOTES
Per telephone order from Dr. Yasir Lovett, Dr. Marcela Cook consulted for a fib with RVR and BMP and Mg labs ordered for tomorrow.

## 2017-01-14 NOTE — PROGRESS NOTES
Hospitalist Progress Note    2017 11:45 AM  Admit Date: 2017  1:24 PM   NAME: Sandy Luna   :  1955   MRN:  [id number]   Attending: Elian Thompson DO  PCP:  Larry Navarro MD  Treatment Team: Attending Provider: Elian Thompson DO; Consulting Provider: Venessa Infante MD; Consulting Provider: Larry Navarro MD; Utilization Review: Eleanor Weber RN  SUBJECTIVE:       Mr. Zachery Cunningham is a 58 yo WM with PMH of afib, COPD, ISABELLE admitted with COPD exacerbation and afib with RVR. He is followed per cardio Dr. Tim Marte, presently on max management currently for tachycardia and eliquis. ECHO EF 55-60%. Has been seen per pulm for COPD, antibiotics stopped and weaned to room air. BC NGTD. Using CPAP for ISABELLE and will need pulm followup. Plans for home at discharge     14 no anorexia, denies dyspnea or cough, no chest pain or palpitation         Recent Results (from the past 24 hour(s))   METABOLIC PANEL, COMPREHENSIVE    Collection Time: 17  5:55 AM   Result Value Ref Range    Sodium 143 136 - 145 mmol/L    Potassium 3.9 3.5 - 5.1 mmol/L    Chloride 107 98 - 107 mmol/L    CO2 24 21 - 32 mmol/L    Anion gap 12 7 - 16 mmol/L    Glucose 95 65 - 100 mg/dL    BUN 11 8 - 23 MG/DL    Creatinine 0.98 0.8 - 1.5 MG/DL    GFR est AA >60 >60 ml/min/1.73m2    GFR est non-AA >60 >60 ml/min/1.73m2    Calcium 8.8 8.3 - 10.4 MG/DL    Bilirubin, total 0.7 0.2 - 1.1 MG/DL    ALT 25 12 - 65 U/L    AST 18 15 - 37 U/L    Alk.  phosphatase 80 50 - 136 U/L    Protein, total 6.6 6.3 - 8.2 g/dL    Albumin 3.3 3.2 - 4.6 g/dL    Globulin 3.3 2.3 - 3.5 g/dL    A-G Ratio 1.0 (L) 1.2 - 3.5     CBC WITH AUTOMATED DIFF    Collection Time: 01/14/17  5:55 AM   Result Value Ref Range    WBC 8.3 4.3 - 11.1 K/uL    RBC 4.60 4.23 - 5.67 M/uL    HGB 15.5 13.6 - 17.2 g/dL    HCT 46.3 41.1 - 50.3 %    .7 (H) 79.6 - 97.8 FL    MCH 33.7 (H) 26.1 - 32.9 PG    MCHC 33.5 31.4 - 35.0 g/dL    RDW 12.9 11.9 - 14.6 %    PLATELET 189 150 - 450 K/uL    MPV 10.2 (L) 10.8 - 14.1 FL    DF AUTOMATED      NEUTROPHILS 75 43 - 78 %    LYMPHOCYTES 17 13 - 44 %    MONOCYTES 7 4.0 - 12.0 %    EOSINOPHILS 1 0.5 - 7.8 %    BASOPHILS 0 0.0 - 2.0 %    IMMATURE GRANULOCYTES 0.2 0.0 - 5.0 %    ABS. NEUTROPHILS 6.2 1.7 - 8.2 K/UL    ABS. LYMPHOCYTES 1.4 0.5 - 4.6 K/UL    ABS. MONOCYTES 0.6 0.1 - 1.3 K/UL    ABS. EOSINOPHILS 0.1 0.0 - 0.8 K/UL    ABS. BASOPHILS 0.0 0.0 - 0.2 K/UL    ABS. IMM.  GRANS. 0.0 0.0 - 0.5 K/UL       No Known Allergies  Current Facility-Administered Medications   Medication Dose Route Frequency Provider Last Rate Last Dose    levalbuterol (XOPENEX) nebulizer soln 0.63 mg/3 mL  0.63 mg Nebulization Q6H RT Saumya Martin NP   0.63 mg at 01/14/17 0713    ipratropium (ATROVENT) 0.02 % nebulizer solution 0.5 mg  0.5 mg Nebulization Q6H RT Brittany Mustafa MD   0.5 mg at 01/14/17 0709    budesonide (PULMICORT) 500 mcg/2 ml nebulizer suspension  500 mcg Nebulization BID RT Brittany Mustafa MD   500 mcg at 01/14/17 5106    0.9% sodium chloride infusion  75 mL/hr IntraVENous CONTINUOUS Rommie Grad, DO 75 mL/hr at 01/14/17 0458 75 mL/hr at 01/14/17 0458    apixaban (ELIQUIS) tablet 5 mg  5 mg Oral BID Rommie Grad, DO   5 mg at 01/14/17 0818    clonazePAM (KlonoPIN) tablet 2 mg  2 mg Oral BID Rommie Grad, DO   2 mg at 01/14/17 0819    dilTIAZem (CARDIZEM) 100 mg in 0.9% sodium chloride (MBP/ADV) 100 mL infusion  0-15 mg/hr IntraVENous CONTINUOUS Rommie Grad, DO 15 mL/hr at 01/14/17 0719 15 mg/hr at 01/14/17 0719    sotalol (BETAPACE) tablet 80 mg  80 mg Oral DAILY Ana Ho, DO   80 mg at 01/14/17 7682    metoprolol tartrate (LOPRESSOR) tablet 25 mg  25 mg Oral Q12H Ana Ho, DO   25 mg at 01/14/17 0965    magnesium oxide (MAG-OX) tablet 400 mg  400 mg Oral DAILY Rommie Grad, DO   400 mg at 01/14/17 0818    thiamine (B-1) tablet 100 mg  100 mg Oral DAILY Corinne Fabry [place], DO   100 mg at  2998    folic acid (FOLVITE) tablet 1 mg  1 mg Oral DAILY Dorena Pilar, DO   1 mg at 17 5904    DULoxetine (CYMBALTA) capsule 120 mg  120 mg Oral DAILY Anson E [place], DO   120 mg at 17 3771    fenofibrate (LOFIBRA) tablet 160 mg  160 mg Oral DAILY Anson E [place], DO   160 mg at 17 0195    fluticasone (FLONASE) 50 mcg/actuation nasal spray 2 Spray  2 Spray Both Nostrils DAILY Dorena Pilar, DO   2 Spray at 17 1037    gabapentin (NEURONTIN) capsule 200 mg  200 mg Oral BID Dorena Pilar, DO   200 mg at 17 5257    guaiFENesin SR (MUCINEX) tablet 1,200 mg  1,200 mg Oral Q12H Anson LEONID Vic, DO   1,200 mg at 17 0818    magnesium oxide (MAG-OX) tablet 400 mg  400 mg Oral 7am Anson LEONID [place], DO   400 mg at 17 1319    pantoprazole (PROTONIX) tablet 40 mg  40 mg Oral ACB Anson E [place], DO   40 mg at 17 0818    rosuvastatin (CRESTOR) tablet 20 mg  20 mg Oral QHS Ana E [place], DO   20 mg at 17 2137    zolpidem (AMBIEN) tablet 10 mg  10 mg Oral QHS PRN Dorena Pilar, DO   10 mg at 17 213    sodium chloride (NS) flush 5-10 mL  5-10 mL IntraVENous PRN Dorena Pilar, DO               Review of Systems as noted above in HPI   PHYSICAL EXAM     Visit Vitals    /85 (BP 1 Location: Left arm, BP Patient Position: Supine)    Pulse (!) 103    Temp 97.5 °F (36.4 °C)    Resp 18    Ht 6' (1.829 m)    Wt 108.9 kg (240 lb)    SpO2 95%    BMI 32.55 kg/m2      Temp (24hrs), Av.7 °F (36.5 °C), Min:97.5 °F (36.4 °C), Max:97.9 °F (36.6 °C)    Oxygen Therapy  O2 Sat (%): 95 % (17)  Pulse via Oximetry: 99 beats per minute (17)  O2 Device: Room air (17)  O2 Flow Rate (L/min): 2 l/min (17)  FIO2 (%): 28 % (17)    Intake/Output Summary (Last 24 hours) at 17 1145  Last data filed at 17 0745   Gross per 24 hour Intake                0 ml   Output             1825 ml   Net            -1825 ml      General: No acute distress,conversive  Lungs:  CTAB, good effort   Heart:  Regular rate and irregular rhythm, no murmur, no edema   Abdomen: Soft, nontender and nondistended, normal bowel sounds  Extremities: Warm and dry         DIAGNOSTIC STUDIES      Labs and Studies from previous 24 hours have been personally reviewed by myself           Prior    ASSESSMENT / [address] Problems    Diagnosis Date Noted    Atrial fibrillation with RVR ([address] 01/11/2017    ISABELLE (obstructive sleep apnea) 01/11/2017     -looking to cardiology for afib management, nursing to contact Dr. Fredo Evans   -stable COPD and ISABELLE with outpatient followup planned   -home when stable    FEN:oral,IVF  DVT Prophylaxis: eliquis  Disposition:pending  Time spent with patient:  Care plan addressed:  Risk Assessment:  Signed By: Juany Bocanegra MD     January 14, 2017

## 2017-01-14 NOTE — PROGRESS NOTES
Per order from Dr. Fernando Arguello, attempted to page Dr. Dmitriy Epstein without any response from the answering service. Dr. Fernando Arguello notified.

## 2017-01-14 NOTE — PROGRESS NOTES
MEWS 3 related to elevated heart rate. Patient being treated and monitored. MD and clinical coordinator aware.

## 2017-01-14 NOTE — PROGRESS NOTES
Problem: Falls - Risk of  Goal: *Knowledge of fall prevention  Outcome: Progressing Towards Goal  Gripper socks on. Call bell in reach. Patient instructed to call for assistance; verbalized understanding.

## 2017-01-14 NOTE — PROGRESS NOTES
Problem: Falls - Risk of  Goal: *Absence of falls  Outcome: Progressing Towards Goal  Fall precautions in place. Red socks on. Instructed to only get OOB with assistance. Voiced understanding. Call light in reach. Goal: *Knowledge of fall prevention  Outcome: Progressing Towards Goal  Pt educated on fall prevention and to use call light for assistance. Pt verbalizes understanding and will use call light for assistance.

## 2017-01-15 LAB
ANION GAP BLD CALC-SCNC: 10 MMOL/L (ref 7–16)
BUN SERPL-MCNC: 14 MG/DL (ref 8–23)
CALCIUM SERPL-MCNC: 8.8 MG/DL (ref 8.3–10.4)
CHLORIDE SERPL-SCNC: 107 MMOL/L (ref 98–107)
CO2 SERPL-SCNC: 27 MMOL/L (ref 21–32)
CREAT SERPL-MCNC: 0.99 MG/DL (ref 0.8–1.5)
GLUCOSE SERPL-MCNC: 89 MG/DL (ref 65–100)
MAGNESIUM SERPL-MCNC: 2.6 MG/DL (ref 1.8–2.4)
POTASSIUM SERPL-SCNC: 3.9 MMOL/L (ref 3.5–5.1)
SODIUM SERPL-SCNC: 144 MMOL/L (ref 136–145)

## 2017-01-15 PROCEDURE — 83735 ASSAY OF MAGNESIUM: CPT | Performed by: INTERNAL MEDICINE

## 2017-01-15 PROCEDURE — 74011250637 HC RX REV CODE- 250/637: Performed by: INTERNAL MEDICINE

## 2017-01-15 PROCEDURE — 74011000250 HC RX REV CODE- 250: Performed by: INTERNAL MEDICINE

## 2017-01-15 PROCEDURE — 74011250636 HC RX REV CODE- 250/636: Performed by: INTERNAL MEDICINE

## 2017-01-15 PROCEDURE — 97161 PT EVAL LOW COMPLEX 20 MIN: CPT

## 2017-01-15 PROCEDURE — 80048 BASIC METABOLIC PNL TOTAL CA: CPT | Performed by: INTERNAL MEDICINE

## 2017-01-15 PROCEDURE — 36415 COLL VENOUS BLD VENIPUNCTURE: CPT | Performed by: INTERNAL MEDICINE

## 2017-01-15 PROCEDURE — 74011000258 HC RX REV CODE- 258: Performed by: INTERNAL MEDICINE

## 2017-01-15 PROCEDURE — 65660000000 HC RM CCU STEPDOWN

## 2017-01-15 PROCEDURE — 94760 N-INVAS EAR/PLS OXIMETRY 1: CPT

## 2017-01-15 PROCEDURE — 94640 AIRWAY INHALATION TREATMENT: CPT

## 2017-01-15 PROCEDURE — 74011000250 HC RX REV CODE- 250: Performed by: NURSE PRACTITIONER

## 2017-01-15 RX ADMIN — LEVALBUTEROL HYDROCHLORIDE 0.63 MG: 0.63 SOLUTION RESPIRATORY (INHALATION) at 07:37

## 2017-01-15 RX ADMIN — ROSUVASTATIN CALCIUM 20 MG: 20 TABLET, FILM COATED ORAL at 21:07

## 2017-01-15 RX ADMIN — SODIUM CHLORIDE 75 ML/HR: 900 INJECTION, SOLUTION INTRAVENOUS at 23:53

## 2017-01-15 RX ADMIN — FENOFIBRATE 160 MG: 160 TABLET ORAL at 08:20

## 2017-01-15 RX ADMIN — GUAIFENESIN 1200 MG: 600 TABLET, EXTENDED RELEASE ORAL at 21:07

## 2017-01-15 RX ADMIN — IPRATROPIUM BROMIDE 0.5 MG: 0.5 SOLUTION RESPIRATORY (INHALATION) at 01:19

## 2017-01-15 RX ADMIN — SODIUM CHLORIDE 15 MG/HR: 900 INJECTION, SOLUTION INTRAVENOUS at 04:27

## 2017-01-15 RX ADMIN — SODIUM CHLORIDE 75 ML/HR: 900 INJECTION, SOLUTION INTRAVENOUS at 08:11

## 2017-01-15 RX ADMIN — GABAPENTIN 200 MG: 100 CAPSULE ORAL at 08:19

## 2017-01-15 RX ADMIN — ZOLPIDEM TARTRATE 10 MG: 5 TABLET, FILM COATED ORAL at 21:07

## 2017-01-15 RX ADMIN — FOLIC ACID 1 MG: 1 TABLET ORAL at 08:19

## 2017-01-15 RX ADMIN — SODIUM CHLORIDE 15 MG/HR: 900 INJECTION, SOLUTION INTRAVENOUS at 23:53

## 2017-01-15 RX ADMIN — BUDESONIDE 500 MCG: 0.5 INHALANT RESPIRATORY (INHALATION) at 19:34

## 2017-01-15 RX ADMIN — LEVALBUTEROL HYDROCHLORIDE 0.63 MG: 0.63 SOLUTION RESPIRATORY (INHALATION) at 01:18

## 2017-01-15 RX ADMIN — SODIUM CHLORIDE 15 MG/HR: 900 INJECTION, SOLUTION INTRAVENOUS at 16:26

## 2017-01-15 RX ADMIN — Medication 100 MG: at 08:20

## 2017-01-15 RX ADMIN — CLONAZEPAM 2 MG: 1 TABLET ORAL at 21:07

## 2017-01-15 RX ADMIN — IPRATROPIUM BROMIDE 0.5 MG: 0.5 SOLUTION RESPIRATORY (INHALATION) at 07:37

## 2017-01-15 RX ADMIN — APIXABAN 5 MG: 5 TABLET, FILM COATED ORAL at 21:07

## 2017-01-15 RX ADMIN — FLUTICASONE PROPIONATE 2 SPRAY: 50 SPRAY, METERED NASAL at 08:23

## 2017-01-15 RX ADMIN — SOTALOL HYDROCHLORIDE 80 MG: 80 TABLET ORAL at 08:20

## 2017-01-15 RX ADMIN — Medication 400 MG: at 08:19

## 2017-01-15 RX ADMIN — DULOXETINE HYDROCHLORIDE 120 MG: 60 CAPSULE, DELAYED RELEASE ORAL at 08:19

## 2017-01-15 RX ADMIN — LEVALBUTEROL HYDROCHLORIDE 0.63 MG: 0.63 SOLUTION RESPIRATORY (INHALATION) at 14:23

## 2017-01-15 RX ADMIN — APIXABAN 5 MG: 5 TABLET, FILM COATED ORAL at 08:19

## 2017-01-15 RX ADMIN — BUDESONIDE 500 MCG: 0.5 INHALANT RESPIRATORY (INHALATION) at 07:37

## 2017-01-15 RX ADMIN — METOPROLOL TARTRATE 25 MG: 25 TABLET ORAL at 08:19

## 2017-01-15 RX ADMIN — GABAPENTIN 200 MG: 100 CAPSULE ORAL at 21:06

## 2017-01-15 RX ADMIN — LEVALBUTEROL HYDROCHLORIDE 0.63 MG: 0.63 SOLUTION RESPIRATORY (INHALATION) at 19:33

## 2017-01-15 RX ADMIN — IPRATROPIUM BROMIDE 0.5 MG: 0.5 SOLUTION RESPIRATORY (INHALATION) at 14:23

## 2017-01-15 RX ADMIN — METOPROLOL TARTRATE 25 MG: 25 TABLET ORAL at 21:07

## 2017-01-15 RX ADMIN — CLONAZEPAM 2 MG: 1 TABLET ORAL at 08:20

## 2017-01-15 RX ADMIN — GUAIFENESIN 1200 MG: 600 TABLET, EXTENDED RELEASE ORAL at 08:11

## 2017-01-15 RX ADMIN — PANTOPRAZOLE SODIUM 40 MG: 40 TABLET, DELAYED RELEASE ORAL at 08:20

## 2017-01-15 RX ADMIN — SODIUM CHLORIDE 15 MG/HR: 900 INJECTION, SOLUTION INTRAVENOUS at 10:17

## 2017-01-15 NOTE — PROGRESS NOTES
Problem: Falls - Risk of  Goal: *Absence of falls  Outcome: Progressing Towards Goal  Fall precautions in place. Red socks on. Instructed to only get OOB with assistance. Voiced understanding. Call light in reach. Goal: *Knowledge of fall prevention  Outcome: Progressing Towards Goal  Pt educated on fall prevention and to use call light for assistance. Pt verbalizes understanding and will use call light for assistance. Problem: Breathing Pattern - Ineffective  Goal: *Absence of hypoxia  Outcome: Progressing Towards Goal  Patient on RA. O2 WNL.

## 2017-01-15 NOTE — PROGRESS NOTES
Problem: Mobility Impaired (Adult and Pediatric)  Goal: *Acute Goals and Plan of Care (Insert Text)  No goals indicated. Pt functioning at baseline. PHYSICAL THERAPY: INITIAL ASSESSMENT, DISCHARGE 1/15/2017  INPATIENT: Hospital Day: 5  Payor: BLUE CROSS / Plan[place] [employer] [facility], located within [facility] / Product Type: PPO /      NAME/AGE/GENDER: Yanira Weiner is a 57 y.o. male    PRIMARY DIAGNOSIS: Sepsis ([facility] [address]  Community acquired bacterial pneumonia  Atrial fibrillation with RVR ([facility] [address] Community acquired bacterial pneumonia Community acquired bacterial pneumonia        ICD-10: Treatment Diagnosis:       · Generalized Muscle Weakness (M62.81)   Precaution/Allergies:  Review of patient's allergies indicates no known allergies. ASSESSMENT:      Mr. Asuncion Nova presents supine in bed. Pt able to independently demonstrate bed mobility, transfers and gait 500' without assistive device. No deficits noted in gait pattern and no loss of balance. Strength and coordination equal throughout. Pt has a dinimished tolerance for physical activity secondary to COPD, but this is his baseline. Educated pt on activity pacing, pursed lip breathing and position change to help increase tolerance for physical activity over time. Pt is currently functioning at baseline and there are no other physical therapy needs identified. This section established at most recent assessment   PROBLEM LIST (Impairments causing functional limitations):  1. Decreased Activity Tolerance    INTERVENTIONS PLANNED: (Benefits and precautions of physical therapy have been discussed with the patient.)  1. none      TREATMENT PLAN: Frequency/Duration: discharge PT  Rehabilitation Potential For Stated Goals: NO GOALS INDICATED      RECOMMENDED REHABILITATION/EQUIPMENT: (at time of discharge pending progress): None.                    HISTORY:   History of Present Injury/Illness (Reason for Referral):  Per chart: Yanira Weiner is a 57year old CM with a PMH of COPD and PAF presented to the ER with acute on chronic shortness of breath, multiple syncopal episodes over the past 4 days, and diaphresis with chills. He woke up at 0430 and felt short of breath so he sat on the side of the bed \"with my hands on my knees\". The SOB persisted through the morning so he went to the local fire dept and they took his vital signs and noticed that his HR was in the 140s so he came to the ER. Currently he complaints of anxiety, shortness of breath, non-productive cough, and diaphoresis. Past Medical History/Comorbidities:   Mr. Guillermo Diggs  has a past medical history of Adverse effect of anesthesia; Arrhythmia (6/30/16); Arthritis; Bronchitis, chronic ([address] (6/30/16); Carotid stenosis; Chronic obstructive pulmonary disease ([address] (6/30/16); Chronic pain; dyslipidemia; Malignant neoplasm of prostate ([address] (12/18/2013); Prostate cancer ([facility] [address] (Dx 12/2012); Psychiatric disorder; and Sleep apnea. Mr. Guillermo Diggs  has a past surgical history that includes cyst removal; colonoscopy; prostatectomy (2/21/2013); heart catheterization; other surgical (1983); other surgical; and colonoscopy (N/A, 7/1/2016). Social History/Living Environment:   Home Environment: Private residence  # Steps to Enter: 1  Rails to Enter: Yes  Hand Rails : Bilateral  Wheelchair Ramp: No  One/Two Story Residence: One story  Living Alone: No  Support Systems: Family member(s)  Patient Expects to be Discharged to[de-identified] Private residence  Current DME Used/Available at Home: Blood pressure cuff, CPAP  Tub or Shower Type: Shower  Prior Level of Function/Work/Activity:  Pt was independent with all functional mobility and gait without assistive device. Occasional falls, but he relates this to a change in blood pressure. Drives. Works in his workshop.  Lives with his wife who does work but can assist.   Number of Personal Factors/Comorbidities that affect the Plan of Care: 1-2: MODERATE COMPLEXITY   EXAMINATION:   Most Recent Physical Functioning:   Gross Assessment:  AROM: Within functional limits  Strength: Within functional limits  Coordination: Within functional limits  Sensation: Intact               Posture:  Posture (WDL): Within defined limits  Balance:  Sitting: Intact  Standing: Intact Bed Mobility:  Rolling: Independent  Supine to Sit: Independent  Sit to Supine: Independent  Scooting: Independent  Wheelchair Mobility:     Transfers:  Sit to Stand: Independent  Stand to Sit: Independent  Bed to Chair: Independent  Gait:     Distance (ft): 500 Feet (ft)  Assistive Device:  (none)  Ambulation - Level of Assistance: Independent       Body Structures Involved:  1. Lungs Body Functions Affected:  1. Respiratory Activities and Participation Affected:  1. General Tasks and Demands  2. Mobility  3. Self Care   Number of elements that affect the Plan of Care: 4+: HIGH COMPLEXITY   CLINICAL PRESENTATION:   Presentation: Stable and uncomplicated: LOW COMPLEXITY   CLINICAL DECISION MAKIN[address] [place] [id number] AM-PAC 6 Clicks   Basic Mobility Inpatient Short Form  How much difficulty does the patient currently have. .. Unable A Lot A Little None   1. Turning over in bed (including adjusting bedclothes, sheets and blankets)? [ ] 1   [ ] 2   [ ] 3   [X] 4   2. Sitting down on and standing up from a chair with arms ( e.g., wheelchair, bedside commode, etc.)   [ ] 1   [ ] 2   [ ] 3   [X] 4   3. Moving from lying on back to sitting on the side of the bed? [ ] 1   [ ] 2   [ ] 3   [X] 4   How much help from another person does the patient currently need. .. Total A Lot A Little None   4. Moving to and from a bed to a chair (including a wheelchair)? [ ] 1   [ ] 2   [ ] 3   [X] 4   5. Need to walk in hospital room? [ ] 1   [ ] 2   [ ] 3   [X] 4   6. Climbing 3-5 steps with a railing? [ ] 1   [ ] 2   [ ] 3   [X] 4   © , Trustees of [address] [place] [id number], under license to [employer].  All rights reserved    Score:  Initial: 24 Most Recent: X (Date: -- )     Interpretation of Tool:  Represents activities that are increasingly more difficult (i.e. Bed mobility, Transfers, Gait). Score 24 23 22-20 19-15 14-10 9-7 6       Modifier CH CI CJ CK CL CM CN         · Mobility - Walking and Moving Around:               - CURRENT STATUS:    CH - 0% impaired, limited or restricted               - GOAL STATUS:           CH - 0% impaired, limited or restricted               - D/C STATUS:                       ---------------To be determined---------------  Payor: BLUE CROSS / [place] [employer] [place] / Product Type: PPO /       Medical Necessity:     · none. Reason for Services/Other Comments:  · none - discharge PT. Use of outcome tool(s) and clinical judgement create a POC that gives a: Clear prediction of patient's progress: LOW COMPLEXITY                 TREATMENT:   (In addition to Assessment/Re-Assessment sessions the following treatments were rendered)   Pre-treatment Symptoms/Complaints:  none  Pain: Initial:     0/10 Post Session:  0/10      Assessment/Reassessment only, no treatment provided today     Treatment/Session Assessment:    · Response to Treatment:  No treatment provided. · Interdisciplinary Collaboration:  · Physical Therapist  · Registered Nurse  · After treatment position/precautions:  · Up in chair  · Bed/Chair-wheels locked  · Bed in low position  · Call light within reach  · RN notified  · Compliance with Program/Exercises: no exercise program issued.   · Recommendations/Intent for next treatment session: discharge PT  Total Treatment Duration:  PT Patient Time In/Time Out  Time In: 1156  Time Out: 160 Soledad Johnson PT

## 2017-01-15 NOTE — PROGRESS NOTES
Hospitalist Progress Note    1/15/2017 2:07 PM  Admit Date: 2017  1:24 PM   NAME: Adelita Ross   :  1955   MRN:  [id number]   Attending: Louise Hanks DO  PCP:  Leydi Morillo MD  Treatment Team: Attending Provider: Louise Hanks DO; Consulting Provider: Leydi Morillo MD; Utilization Review: Kacey Veloz, RN; Consulting Provider: Kamran Maria MD  SUBJECTIVE:       Mr. Ivonne Sheikh is a 58 yo WM with PMH of afib followed per Dr. Hawkins President, COPD, ISABELLE admitted with COPD exacerbation and afib with RVR. He is presently on max management currently for tachycardia and eliquis. ECHO EF 55-60%. Has been seen per pulm for COPD, antibiotics stopped and weaned to room air. BC NGTD. Using CPAP for ISABELLE and will need pulm followup.  Plans for home at discharge     1-15 patient spoke to Dr. Hawkins Presroger yesterday and has pending EP consult, denies dyspnea/cough/palpitation/edema/anorexia or BM changes    Recent Results (from the past 24 hour(s))   MAGNESIUM    Collection Time: 01/15/17  6:12 AM   Result Value Ref Range    Magnesium 2.6 (H) 1.8 - 2.4 mg/dL   METABOLIC PANEL, BASIC    Collection Time: 01/15/17  6:12 AM   Result Value Ref Range    Sodium 144 136 - 145 mmol/L    Potassium 3.9 3.5 - 5.1 mmol/L    Chloride 107 98 - 107 mmol/L    CO2 27 21 - 32 mmol/L    Anion gap 10 7 - 16 mmol/L    Glucose 89 65 - 100 mg/dL    BUN 14 8 - 23 MG/DL    Creatinine 0.99 0.8 - 1.5 MG/DL    GFR est AA >60 >60 ml/min/1.73m2    GFR est non-AA >60 >60 ml/min/1.73m2    Calcium 8.8 8.3 - 10.4 MG/DL       No Known Allergies  Current Facility-Administered Medications   Medication Dose Route Frequency Provider Last Rate Last Dose    apixaban (ELIQUIS) tablet 5 mg  5 mg Oral Q12H Leydi Morillo MD   5 mg at 01/15/17 0819    levalbuterol (XOPENEX) nebulizer soln 0.63 mg/3 mL  0.63 mg Nebulization Q6H RT Lashonda Moody NP   0.63 mg at 01/15/17 0737    ipratropium (ATROVENT) 0.02 % nebulizer solution 0.5 mg  0.5 mg Nebulization Q6H RT Augustine Mercer MD   0.5 mg at 01/15/17 0737    budesonide (PULMICORT) 500 mcg/2 ml nebulizer suspension  500 mcg Nebulization BID RT Augustine Mercer MD   500 mcg at 01/15/17 3107    0.9% sodium chloride infusion  75 mL/hr IntraVENous CONTINUOUS Salinas Lied, DO 75 mL/hr at 01/15/17 0811 75 mL/hr at 01/15/17 0811    clonazePAM (KlonoPIN) tablet 2 mg  2 mg Oral BID Twana Lied, DO   2 mg at 01/15/17 0820    dilTIAZem (CARDIZEM) 100 mg in 0.9% sodium chloride (MBP/ADV) 100 mL infusion  0-15 mg/hr IntraVENous CONTINUOUS Salinas Lied, DO 15 mL/hr at 01/15/17 1017 15 mg/hr at 01/15/17 1017    sotalol (BETAPACE) tablet 80 mg  80 mg Oral DAILY Ana Ho, DO   80 mg at 01/15/17 0820    metoprolol tartrate (LOPRESSOR) tablet 25 mg  25 mg Oral Q12H Ana Ho, DO   25 mg at 01/15/17 8589    magnesium oxide (MAG-OX) tablet 400 mg  400 mg Oral DAILY Twana Lied, DO   400 mg at 01/15/17 1417    thiamine (B-1) tablet 100 mg  100 mg Oral DAILY Twana Lied, DO   100 mg at 28/41/16 2903    folic acid (FOLVITE) tablet 1 mg  1 mg Oral DAILY Ana Ho, DO   1 mg at 01/15/17 0874    DULoxetine (CYMBALTA) capsule 120 mg  120 mg Oral DAILY Ana Ho, DO   120 mg at 01/15/17 4040    fenofibrate (LOFIBRA) tablet 160 mg  160 mg Oral DAILY Ana Ho, DO   160 mg at 01/15/17 0820    fluticasone (FLONASE) 50 mcg/actuation nasal spray 2 Spray  2 Spray Both Nostrils DAILY Ana Rebolledo, DO   2 [place] at 01/15/17 5329    gabapentin (NEURONTIN) capsule 200 mg  200 mg Oral BID Twana Lied, DO   200 mg at 01/15/17 9745    guaiFENesin SR (MUCINEX) tablet 1,200 mg  1,200 mg Oral Q12H Mansfield E McCormick, DO   1,200 mg at 01/15/17 3557    pantoprazole (PROTONIX) tablet 40 mg  40 mg Oral ACB Ana E McCormick, DO   40 mg at 01/15/17 0820    rosuvastatin (CRESTOR) tablet 20 mg  20 mg Oral QHS Mansfield E Vic, DO   20 mg at 17    zolpidem (AMBIEN) tablet 10 mg  10 mg Oral QHS PRN Radha Elias, DO   10 mg at 17    sodium chloride (NS) flush 5-10 mL  5-10 mL IntraVENous PRN Radha Elias, DO   10 mL at 17           Review of Systems as noted above in HPI   PHYSICAL EXAM     Visit Vitals    /52    Pulse (!) 109    Temp 98.4 °F (36.9 °C)    Resp 18    Ht 6' (1.829 m)    Wt 109.4 kg (241 lb 3.2 oz)    SpO2 97%    BMI 32.71 kg/m2      Temp (24hrs), Av.7 °F (36.5 °C), Min:97.4 °F (36.3 °C), Max:98.4 °F (36.9 °C)    Oxygen Therapy  O2 Sat (%): 97 % (01/15/17 1218)  Pulse via Oximetry: 75 beats per minute (01/15/17 0740)  O2 Device: Room air (01/15/17 0740)  O2 Flow Rate (L/min): 0 l/min (01/15/17 0740)  FIO2 (%): 21 % (01/15/17 0740)    Intake/Output Summary (Last 24 hours) at 01/15/17 1406  Last data filed at 01/15/17 1221   Gross per 24 hour   Intake          1581.75 ml   Output             2625 ml   Net         -1043.25 ml      General: No acute distress,conversive  Lungs:  CTAB, good effort   Heart:  tachycardic and irregular rhythm, no murmur, no edema   Abdomen: Soft, nontender and nondistended, normal bowel sounds  Extremities: Warm and dry         DIAGNOSTIC STUDIES      Labs and Studies from previous 24 hours have been personally reviewed by myself           Prior    ASSESSMENT / Kelly Cotto [address] Problems    Diagnosis Date Noted    Atrial fibrillation with RVR ([facility] [address] 2017    ISABELLE (obstructive sleep apnea) 2017         -EP consult for monday  -stable COPD and ISABELLE with outpatient followup planned   -home when stable    FEN:oral,IVF  DVT Prophylaxis: eliquis  Disposition:pending  Time spent with patient:  Care plan addressed:  Risk Assessment:  Signed By: Skinny Foster MD     January 15, 2017

## 2017-01-15 NOTE — PROGRESS NOTES
Bedside and Verbal shift change report given to [facility] - EARLENE GUPTA RN (oncoming nurse) by RALEIGH Moffett (offgoing nurse). Report included the following information SBAR, Kardex, Accordion and Recent Results.

## 2017-01-16 ENCOUNTER — ANESTHESIA EVENT (OUTPATIENT)
Dept: SURGERY | Age: 62
DRG: 191 | End: 2017-01-16
Payer: COMMERCIAL

## 2017-01-16 ENCOUNTER — ANESTHESIA (OUTPATIENT)
Dept: SURGERY | Age: 62
DRG: 191 | End: 2017-01-16
Payer: COMMERCIAL

## 2017-01-16 ENCOUNTER — SURGERY (OUTPATIENT)
Age: 62
End: 2017-01-16

## 2017-01-16 LAB
ATRIAL RATE: 120 BPM
ATRIAL RATE: 138 BPM
BACTERIA SPEC CULT: NORMAL
BACTERIA SPEC CULT: NORMAL
CALCULATED P AXIS, ECG09: NORMAL DEGREES
CALCULATED P AXIS, ECG09: NORMAL DEGREES
CALCULATED R AXIS, ECG10: 57 DEGREES
CALCULATED R AXIS, ECG10: 61 DEGREES
CALCULATED T AXIS, ECG11: 51 DEGREES
CALCULATED T AXIS, ECG11: 55 DEGREES
DIAGNOSIS, 93000: NORMAL
DIAGNOSIS, 93000: NORMAL
DIASTOLIC BP, ECG02: NORMAL MMHG
DIASTOLIC BP, ECG02: NORMAL MMHG
P-R INTERVAL, ECG05: NORMAL MS
P-R INTERVAL, ECG05: NORMAL MS
Q-T INTERVAL, ECG07: 350 MS
Q-T INTERVAL, ECG07: 394 MS
QRS DURATION, ECG06: 82 MS
QRS DURATION, ECG06: 90 MS
QTC CALCULATION (BEZET), ECG08: 444 MS
QTC CALCULATION (BEZET), ECG08: 460 MS
SERVICE CMNT-IMP: NORMAL
SERVICE CMNT-IMP: NORMAL
SYSTOLIC BP, ECG01: NORMAL MMHG
SYSTOLIC BP, ECG01: NORMAL MMHG
VENTRICULAR RATE, ECG03: 82 BPM
VENTRICULAR RATE, ECG03: 97 BPM

## 2017-01-16 PROCEDURE — 65660000000 HC RM CCU STEPDOWN

## 2017-01-16 PROCEDURE — B24BZZ4 ULTRASONOGRAPHY OF HEART WITH AORTA, TRANSESOPHAGEAL: ICD-10-PCS | Performed by: INTERNAL MEDICINE

## 2017-01-16 PROCEDURE — 74011000250 HC RX REV CODE- 250: Performed by: INTERNAL MEDICINE

## 2017-01-16 PROCEDURE — 74011250636 HC RX REV CODE- 250/636

## 2017-01-16 PROCEDURE — 74011000258 HC RX REV CODE- 258: Performed by: INTERNAL MEDICINE

## 2017-01-16 PROCEDURE — 76060000032 HC ANESTHESIA 0.5 TO 1 HR: Performed by: INTERNAL MEDICINE

## 2017-01-16 PROCEDURE — 74011250637 HC RX REV CODE- 250/637: Performed by: INTERNAL MEDICINE

## 2017-01-16 PROCEDURE — 92960 CARDIOVERSION ELECTRIC EXT: CPT

## 2017-01-16 PROCEDURE — 94640 AIRWAY INHALATION TREATMENT: CPT

## 2017-01-16 PROCEDURE — 93005 ELECTROCARDIOGRAM TRACING: CPT | Performed by: INTERNAL MEDICINE

## 2017-01-16 PROCEDURE — 74011250636 HC RX REV CODE- 250/636: Performed by: ANESTHESIOLOGY

## 2017-01-16 PROCEDURE — 5A2204Z RESTORATION OF CARDIAC RHYTHM, SINGLE: ICD-10-PCS | Performed by: INTERNAL MEDICINE

## 2017-01-16 PROCEDURE — 77010033678 HC OXYGEN DAILY

## 2017-01-16 PROCEDURE — 94760 N-INVAS EAR/PLS OXIMETRY 1: CPT

## 2017-01-16 PROCEDURE — 97165 OT EVAL LOW COMPLEX 30 MIN: CPT

## 2017-01-16 PROCEDURE — 74011000250 HC RX REV CODE- 250: Performed by: NURSE PRACTITIONER

## 2017-01-16 RX ORDER — FAMOTIDINE 10 MG/ML
20 INJECTION INTRAVENOUS ONCE
Status: COMPLETED | OUTPATIENT
Start: 2017-01-16 | End: 2017-01-16

## 2017-01-16 RX ORDER — SOTALOL HYDROCHLORIDE 80 MG/1
160 TABLET ORAL EVERY 12 HOURS
Status: DISCONTINUED | OUTPATIENT
Start: 2017-01-16 | End: 2017-01-19 | Stop reason: HOSPADM

## 2017-01-16 RX ORDER — MIDAZOLAM HYDROCHLORIDE 1 MG/ML
2 INJECTION, SOLUTION INTRAMUSCULAR; INTRAVENOUS ONCE
Status: COMPLETED | OUTPATIENT
Start: 2017-01-16 | End: 2017-01-16

## 2017-01-16 RX ORDER — SODIUM CHLORIDE 0.9 % (FLUSH) 0.9 %
5-10 SYRINGE (ML) INJECTION AS NEEDED
Status: DISCONTINUED | OUTPATIENT
Start: 2017-01-16 | End: 2017-01-16 | Stop reason: HOSPADM

## 2017-01-16 RX ORDER — LIDOCAINE HYDROCHLORIDE 10 MG/ML
0.1 INJECTION INFILTRATION; PERINEURAL AS NEEDED
Status: DISCONTINUED | OUTPATIENT
Start: 2017-01-16 | End: 2017-01-16 | Stop reason: HOSPADM

## 2017-01-16 RX ORDER — SODIUM CHLORIDE 0.9 % (FLUSH) 0.9 %
5-10 SYRINGE (ML) INJECTION EVERY 8 HOURS
Status: DISCONTINUED | OUTPATIENT
Start: 2017-01-16 | End: 2017-01-19 | Stop reason: HOSPADM

## 2017-01-16 RX ORDER — SODIUM CHLORIDE, SODIUM LACTATE, POTASSIUM CHLORIDE, CALCIUM CHLORIDE 600; 310; 30; 20 MG/100ML; MG/100ML; MG/100ML; MG/100ML
125 INJECTION, SOLUTION INTRAVENOUS CONTINUOUS
Status: DISCONTINUED | OUTPATIENT
Start: 2017-01-16 | End: 2017-01-16 | Stop reason: HOSPADM

## 2017-01-16 RX ORDER — PROPOFOL 10 MG/ML
INJECTION, EMULSION INTRAVENOUS AS NEEDED
Status: DISCONTINUED | OUTPATIENT
Start: 2017-01-16 | End: 2017-01-16 | Stop reason: HOSPADM

## 2017-01-16 RX ADMIN — APIXABAN 5 MG: 5 TABLET, FILM COATED ORAL at 21:34

## 2017-01-16 RX ADMIN — FENOFIBRATE 160 MG: 160 TABLET ORAL at 08:32

## 2017-01-16 RX ADMIN — BUDESONIDE 500 MCG: 0.5 INHALANT RESPIRATORY (INHALATION) at 07:14

## 2017-01-16 RX ADMIN — FAMOTIDINE 20 MG: 10 INJECTION, SOLUTION INTRAVENOUS at 14:51

## 2017-01-16 RX ADMIN — ZOLPIDEM TARTRATE 10 MG: 5 TABLET, FILM COATED ORAL at 21:33

## 2017-01-16 RX ADMIN — PROPOFOL 20 MG: 10 INJECTION, EMULSION INTRAVENOUS at 15:06

## 2017-01-16 RX ADMIN — LEVALBUTEROL HYDROCHLORIDE 0.63 MG: 0.63 SOLUTION RESPIRATORY (INHALATION) at 07:14

## 2017-01-16 RX ADMIN — DULOXETINE HYDROCHLORIDE 120 MG: 60 CAPSULE, DELAYED RELEASE ORAL at 08:32

## 2017-01-16 RX ADMIN — BUDESONIDE 500 MCG: 0.5 INHALANT RESPIRATORY (INHALATION) at 19:46

## 2017-01-16 RX ADMIN — CLONAZEPAM 2 MG: 1 TABLET ORAL at 08:32

## 2017-01-16 RX ADMIN — GABAPENTIN 200 MG: 100 CAPSULE ORAL at 08:32

## 2017-01-16 RX ADMIN — GABAPENTIN 200 MG: 100 CAPSULE ORAL at 21:34

## 2017-01-16 RX ADMIN — MIDAZOLAM HYDROCHLORIDE 2 MG: 1 INJECTION, SOLUTION INTRAMUSCULAR; INTRAVENOUS at 14:51

## 2017-01-16 RX ADMIN — IPRATROPIUM BROMIDE 0.5 MG: 0.5 SOLUTION RESPIRATORY (INHALATION) at 19:46

## 2017-01-16 RX ADMIN — ROSUVASTATIN CALCIUM 20 MG: 20 TABLET, FILM COATED ORAL at 21:34

## 2017-01-16 RX ADMIN — IPRATROPIUM BROMIDE 0.5 MG: 0.5 SOLUTION RESPIRATORY (INHALATION) at 07:13

## 2017-01-16 RX ADMIN — IPRATROPIUM BROMIDE 0.5 MG: 0.5 SOLUTION RESPIRATORY (INHALATION) at 02:14

## 2017-01-16 RX ADMIN — PROPOFOL 30 MG: 10 INJECTION, EMULSION INTRAVENOUS at 15:02

## 2017-01-16 RX ADMIN — Medication 400 MG: at 08:31

## 2017-01-16 RX ADMIN — CLONAZEPAM 2 MG: 1 TABLET ORAL at 21:34

## 2017-01-16 RX ADMIN — PROPOFOL 50 MG: 10 INJECTION, EMULSION INTRAVENOUS at 15:01

## 2017-01-16 RX ADMIN — FLUTICASONE PROPIONATE 2 SPRAY: 50 SPRAY, METERED NASAL at 08:33

## 2017-01-16 RX ADMIN — SODIUM CHLORIDE 15 MG/HR: 900 INJECTION, SOLUTION INTRAVENOUS at 05:51

## 2017-01-16 RX ADMIN — PROPOFOL 50 MG: 10 INJECTION, EMULSION INTRAVENOUS at 15:00

## 2017-01-16 RX ADMIN — Medication 100 MG: at 08:32

## 2017-01-16 RX ADMIN — FOLIC ACID 1 MG: 1 TABLET ORAL at 08:32

## 2017-01-16 RX ADMIN — PROPOFOL 20 MG: 10 INJECTION, EMULSION INTRAVENOUS at 15:09

## 2017-01-16 RX ADMIN — PANTOPRAZOLE SODIUM 40 MG: 40 TABLET, DELAYED RELEASE ORAL at 08:32

## 2017-01-16 RX ADMIN — SOTALOL HYDROCHLORIDE 160 MG: 80 TABLET ORAL at 18:23

## 2017-01-16 RX ADMIN — SODIUM CHLORIDE, SODIUM LACTATE, POTASSIUM CHLORIDE, AND CALCIUM CHLORIDE: 600; 310; 30; 20 INJECTION, SOLUTION INTRAVENOUS at 14:52

## 2017-01-16 RX ADMIN — GUAIFENESIN 1200 MG: 600 TABLET, EXTENDED RELEASE ORAL at 08:32

## 2017-01-16 RX ADMIN — APIXABAN 5 MG: 5 TABLET, FILM COATED ORAL at 08:32

## 2017-01-16 RX ADMIN — Medication 10 ML: at 21:34

## 2017-01-16 RX ADMIN — LEVALBUTEROL HYDROCHLORIDE 0.63 MG: 0.63 SOLUTION RESPIRATORY (INHALATION) at 02:14

## 2017-01-16 RX ADMIN — GUAIFENESIN 1200 MG: 600 TABLET, EXTENDED RELEASE ORAL at 21:34

## 2017-01-16 RX ADMIN — LEVALBUTEROL HYDROCHLORIDE 0.63 MG: 0.63 SOLUTION RESPIRATORY (INHALATION) at 19:46

## 2017-01-16 NOTE — PROGRESS NOTES
TRANSFER - OUT REPORT:    Verbal report given to Kayla Malcolm RN on [employer]  being transferred to [facility] for ordered procedure       Report consisted of patients Situation, Background, Assessment and Recommendations(SBAR). Information from the following report(s) SBAR, Kardex, Intake/Output, MAR, Recent Results, Med Rec Status and Cardiac Rhythm AFib was reviewed with the receiving nurse. Opportunity for questions and clarification was provided.

## 2017-01-16 NOTE — PROGRESS NOTES
Problem: Falls - Risk of  Goal: *Absence of falls  Outcome: Progressing Towards Goal  Pt progressing towards goal. No falls since admission. Bed low and locked. Call light within reach. Side rails x 2. Gripper socks applied. Personal belongings within reach. Pt verbalizes understanding to call for assistance. Goal: *Knowledge of fall prevention  Outcome: Progressing Towards Goal  Pt educated on fall prevention and to use call light for assistance. Pt verbalizes understanding and will use call light for assistance.

## 2017-01-16 NOTE — ANESTHESIA POSTPROCEDURE EVALUATION
Post-Anesthesia Evaluation and Assessment    Patient: Gabi Dorman MRN: [id number]  SSN: [id number]    YOB: 1955  Age: 57 y.o. Sex: male       Cardiovascular Function/Vital Signs  Visit Vitals    /85    Pulse 88    Temp 36.6 °C (97.9 °F)    Resp 16    Ht 6' (1.829 m)    Wt 110.2 kg (243 lb)    SpO2 96%    BMI 32.96 kg/m2       Patient is status post total IV anesthesia anesthesia for Procedure(s):  ESOPHAGEAL TRANS ECHOCARDIOGRAM CARDIOVERSION . Nausea/Vomiting: None    Postoperative hydration reviewed and adequate. Pain:  Pain Scale 1: Numeric (0 - 10) (01/16/17 1526)  Pain Intensity 1: 0 (01/16/17 1526)   Managed    Neurological Status:   Neuro (WDL): Within Defined Limits (01/16/17 1526)  Neuro  Neurologic State: Alert (01/16/17 1045)  Orientation Level: Oriented to person;Oriented to situation (01/16/17 1045)  Cognition: Follows commands (01/16/17 1045)   At baseline    Mental Status and Level of Consciousness: Arousable    Pulmonary Status:   O2 Device: Nasal cannula (01/16/17 1526)   Adequate oxygenation and airway patent    Complications related to anesthesia: None    Post-anesthesia assessment completed.  No concerns    Signed By: Jaqueline Clayton MD     January 16, 2017

## 2017-01-16 NOTE — PROGRESS NOTES
Bedside and Verbal shift change report given to Cece López RN (oncoming nurse) by self (offgoing nurse). Report included the following information SBAR, Kardex, MAR and Recent Results. Cardizem gtt visualized with oncoming RN. VSS throughout the night. Printed and placed in chart.

## 2017-01-16 NOTE — PROGRESS NOTES
TRANSFER - IN REPORT:    Verbal report received from [facility] on [employer]  being received from [facility] for routine progression of care. Report consisted of patients Situation, Background, Assessment and Recommendations(SBAR). Information from the following reports was reviewed: Kardex, Procedure Summary, MAR and Recent Results. Opportunity for questions and clarification was provided. Assessment completed upon patients arrival to unit and care assumed. Patient received back to room 304 and assessment completed. Patient connected to telemetry monitor and eagle with BP cycling every 30 minutes. Telemetry monitor reveals AFib. Patient oriented to room and plan of care reviewed. Patient voiced understanding to use call light to communicate needs.

## 2017-01-16 NOTE — PROGRESS NOTES
Bedside and Verbal shift change report given to self (oncoming nurse) by Shahram Simon RN (offgoing nurse). Report included the following information SBAR, Kardex, MAR and Recent Results.  Cardizem gtt visualized

## 2017-01-16 NOTE — PROGRESS NOTES
Paged Dr. Anna Lr. Telephone order received to obtain consent for ANDI with possible cardioversion and to discontinue both Sotalol and Metoprolol, and to give Eliquis this morning.

## 2017-01-16 NOTE — PROGRESS NOTES
Problem: Afib Pathway: Day 3  Goal: Medications  Outcome: Progressing Towards Goal  Cardizem gtt infusing at 15mg/hr. Goal: Treatments/Interventions/Procedures  Outcome: Progressing Towards Goal  ANDI with possible cardioversion planned for today.

## 2017-01-16 NOTE — PROGRESS NOTES
Verbal bedside report received from [address] [facility], RN. Assumed care of patient. Cardizem IV drip verified at bedside with outgoing RN.

## 2017-01-16 NOTE — PROCEDURES
Pre-Procedure Diagnosis  1. Atrial fibrilllation     Procedure Performed  1. Transesophageal Echocardiogram  2. Direct Current Cardioversion    Anesthesia: MAC     Specimens: * No specimens in log *     Procedural Description: The patient was brought to the operating suite in a fasting, nonsedated state. The risks, benefits and alternatives of the procedure were reviewed with the patient, and final questions answered. Informed consent was confirmed. A procedural timeout was called and completed per institutional policy. Once appropriate monitors were applied, procedural MAC anesthesia was induced and maintained by a staff anesthesiologist. Once an appropriate level of sedation was achieved, a transesophageal echocardiogram probe was inserted into the esophagus with ease. A comprehensive ANDI study was completed and the full report available in the chart. There was no evidence of spontaneous echo contrast or thrombus in the left atrial appendage. The patient was converted to sinus rhythm with pads across the anterior and posterior chest wall. Unfortunately, he reverted back to afib prior to leaving the EP lab. The patient awoke from his procedure without overt complications. Will plan for sotalol loading and repeat DCCV on antiarrhythmic drugs. Post Procedure Diagnosis: No ALICIA appendage thrombus, Normal sinus rhythm with quick reversion to afib    Omer Díaz MD, MS  Clinical Cardiac Electrophysiology  1/16/2017  3:14 PM

## 2017-01-16 NOTE — PROGRESS NOTES
TRANSFER - OUT REPORT:    Verbal report given to Tavia(name) on [address]  being transferred back to telemetry(unit) for routine progression of care       Report consisted of patients Situation, Background, Assessment and   Recommendations(SBAR). Information from the following report(s) Procedure Summary was reviewed with the receiving nurse. Lines:   Peripheral IV 01/13/17 Left Forearm (Active)   Site Assessment Clean, dry, & intact 1/16/2017 11:00 AM   Phlebitis Assessment 0 1/16/2017 11:00 AM   Infiltration Assessment 0 1/16/2017 11:00 AM   Dressing Status Clean, dry, & intact 1/16/2017 11:00 AM   Dressing Type Transparent;Tape 1/16/2017 11:00 AM   Hub Color/Line Status Infusing 1/16/2017 11:00 AM   Alcohol Cap Used No 1/13/2017  5:50 PM        Opportunity for questions and clarification was provided.       Patient transported with:   Registered Nurse

## 2017-01-16 NOTE — PROGRESS NOTES
Problem: Self Care Deficits Care Plan (Adult)  Goal: *Acute Goals and Plan of Care (Insert Text)      OCCUPATIONAL THERAPY: Initial Assessment and Discharge 1/16/2017  INPATIENT: Hospital Day: 6  Payor: Crescencio Rosales / Plan: [place] [facility] / Product Type: PPO /      NAME/AGE/GENDER: Gabi Dorman is a 57 y.o. male    PRIMARY DIAGNOSIS:  Sepsis ([address]  Community acquired bacterial pneumonia  Atrial fibrillation with RVR ([address]  A-FIB Community acquired bacterial pneumonia Community acquired bacterial pneumonia  Procedure(s) (LRB):  ESOPHAGEAL TRANS ECHOCARDIOGRAM CARDIOVERSION  (N/A)     ICD-10: Treatment Diagnosis:        · Difficulty in walking, Not elsewhere classified (R26.2)   Precautions/Allergies:         Review of patient's allergies indicates no known allergies. ASSESSMENT:      Mr. Jimena Fabian presents with atrial fibrillation (rate in the  range) and history of COPD. BP post activity (functional mobility for activities of daily living with independence in room/caicedo) was 119/73. He displays good strength, flexibility, and dynamic standing balance without deficits in activities of daily living or instrumental activities of daily living. He is awaiting cardioversion today and should be able to get up and move around tomorrow with RN permission. He verbalized understanding of basic energy conservation strategies. No further need for acute occupational therapy services. Thanks for the consult! This section established at most recent assessment   PROBLEM LIST (Impairments causing functional limitations):  1. Decreased Activity Tolerance    INTERVENTIONS PLANNED: (Benefits and precautions of occupational therapy have been discussed with the patient.)  1. Assessment only      TREATMENT PLAN: Frequency/Duration: Follow patient for today's assessment only.   Rehabilitation Potential For Stated Goals: N/A      RECOMMENDED REHABILITATION/EQUIPMENT: (at time of discharge pending progress): None. OCCUPATIONAL PROFILE AND HISTORY:   History of Present Injury/Illness (Reason for Referral):  Per MD H & P: Nawaf Higuera is a 57year old CM with a PMH of COPD and PAF presented to the ER with acute on chronic shortness of breath, multiple syncopal episodes over the past 4 days, and diaphresis with chills. He woke up at 0430 and felt short of breath so he sat on the side of the bed \"with my hands on my knees\". The SOB persisted through the morning so he went to the local fire dept and they took his vital signs and noticed that his HR was in the 140s so he came to the ER. Past Medical History/Comorbidities:   Mr. Lolly Mena  has a past medical history of Adverse effect of anesthesia; Arrhythmia (6/30/16); Arthritis; Bronchitis, chronic ([address] (6/30/16); Carotid stenosis; Chronic obstructive pulmonary disease ([address] (6/30/16); Chronic pain; dyslipidemia; Malignant neoplasm of prostate ([address] (12/18/2013); Prostate cancer ([address] (Dx 12/2012); Psychiatric disorder; and Sleep apnea. Mr. Lolly Mena  has a past surgical history that includes cyst removal; colonoscopy; prostatectomy (2/21/2013); heart catheterization; other surgical (1983); other surgical; and colonoscopy (N/A, 7/1/2016). 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 He states his wife works. He \"peddles around\" in his work shop. Rides his motorcycle.    Number of Personal Factors/Comorbidities that affect the Plan of Care: Brief history (0):  LOW COMPLEXITY   ASSESSMENT OF OCCUPATIONAL PERFORMANCE[de-identified]   Activities of Daily Living:           Basic ADLs (From Assessment) Complex ADLs (From Assessment)   Basic ADL  Feeding: Independent  Oral Facial Hygiene/Grooming: Independent  Bathing: Independent  Upper Body Dressing: Independent  Lower Body Dressing: Independent  Toileting: Independent Instrumental ADL  Meal Preparation: Independent  Homemaking: Modified independent   Grooming/Bathing/Dressing Activities of Daily Living     Cognitive Retraining  Safety/Judgement: Awareness of environment                     Lower Body Dressing Assistance  Slip on Shoes with Back: Independent Bed/Mat Mobility  Rolling: Independent  Supine to Sit: Independent  Sit to Supine: Independent  Sit to Stand: Independent  Scooting: Independent          Most Recent Physical Functioning:   Gross Assessment:  AROM: Within functional limits  Strength: Within functional limits               Posture:  Posture (WDL): Within defined limits  Balance:  Sitting: Intact  Standing: Intact Bed Mobility:  Rolling: Independent  Supine to Sit: Independent  Sit to Supine: Independent  Scooting: Independent  Wheelchair Mobility:     Transfers:  Sit to Stand: Independent  Stand to Sit: Independent                 Patient Vitals for the past 6 hrs:       BP BP Patient Position SpO2 Pulse   01/16/17 0714 - - 94 % -   01/16/17 0832 123/87 - - 84   01/16/17 0853 122/71 Head of bed elevated (Comment degrees) 94 % 82   01/16/17 1045 119/73 Post activity - -        Mental Status  Neurologic State: Alert  Orientation Level: Oriented to person, Oriented to situation  Cognition: Follows commands  Perception: Appears intact  Perseveration: No perseveration noted  Safety/Judgement: Awareness of environment                               Physical Skills Involved:  1. Activity tolerance Cognitive Skills Affected (resulting in the inability to perform in a timely and safe manner):   1. none noted Psychosocial Skills Affected:  1. Habits   Number of elements that affect the Plan of Care: 1-3:  LOW COMPLEXITY   CLINICAL DECISION MAKIN[address] [place] [address] AM-PAC 6 Clicks   Basic Mobility Inpatient Short Form  How much help from another person does the patient currently need. .. Total A Lot A Little None   1. Putting on and taking off regular lower body clothing?   [ ] 1   [ ] 2   [ ] 3   [X] 4   2. Bathing (including washing, rinsing, drying)? [ ] 1   [ ] 2   [ ] 3   [X] 4   3. Toileting, which includes using toilet, bedpan or urinal?   [ ] 1   [ ] 2   [ ] 3   [X] 4   4. Putting on and taking off regular upper body clothing?   [ ] 1   [ ] 2   [ ] 3   [X] 4   5. Taking care of personal grooming such as brushing teeth? [ ] 1   [ ] 2   [ ] 3   [X] 4   6. Eating meals? [ ] 1   [ ] 2   [ ] 3   [X] 4   © , Trustees of [address] [place] [address], under license to [employer]. All rights reserved    ·    Use of outcome tool(s) and clinical judgement create a POC that gives a: LOW COMPLEXITY             TREATMENT:   (In addition to Assessment/Re-Assessment sessions the following treatments were rendered)      Pre-treatment Symptoms/Complaints:  none  Pain: Initial:   Pain Intensity 1: 0  Post Session:        Assessment/Reassessment only, no treatment provided today     Treatment/Session Assessment:         Response to Treatment:  No significant dyspnea upon exertion. Interdisciplinary Collaboration:   · Occupational Therapist  · Registered Nurse  · MD (Dr. Graciela Gray)     After treatment position/precautions:   · Supine in bed  · Bed/Chair-wheels locked  · Bed in low position  · Call light within reach  · RN notified  · Side rails x 2     Compliance with Program/Exercises: compliant today. Recommendations/Intent for next treatment session:   \"Next visit will focus on N/A\"       Total Treatment Duration:  OT Patient Time In/Time Out  Time In: 1033  Time Out: Bridgett Johnson [address] [place], OTD, OTR/L

## 2017-01-16 NOTE — ANESTHESIA PREPROCEDURE EVALUATION
Anesthetic History               Review of Systems / Medical History  Patient summary reviewed and pertinent labs reviewed    Pulmonary    COPD: moderate    Sleep apnea: CPAP           Neuro/Psych              Cardiovascular    Hypertension        Dysrhythmias : atrial fibrillation  CAD    Exercise tolerance: >4 METS     GI/Hepatic/Renal     GERD: poorly controlled           Endo/Other        Obesity and arthritis     Other Findings              Physical Exam    Airway  Mallampati: II  TM Distance: > 6 cm  Neck ROM: normal range of motion   Mouth opening: Normal     Cardiovascular    Rhythm: irregular  Rate: abnormal         Dental  No notable dental hx       Pulmonary  Breath sounds clear to auscultation               Abdominal         Other Findings            Anesthetic Plan    ASA: 4  Anesthesia type: total IV anesthesia            Anesthetic plan and risks discussed with: Patient

## 2017-01-16 NOTE — CONSULTS
[facility] Cardiology/Electrophyiology Consult                Date of  Admission: 1/11/2017  1:24 PM     CC/Reason for consult: atrial fibrillation    Vandana Medina is a 57 y.o. male admitted for Sepsis ([facility] Community acquired bacterial pneumonia; Atrial *.      58 yo male with a history of COPD / emphysema, ISABELLE maintained on CPAP, GERD, prostate CA, atrial fibrillation followed by Dr. Ronit Mason who was admitted with afib with RVR and EP was consulted for management. Pt reports he presented to the ER with complaints of acutely worsening chronic shortness of breath. Pt reports waking up the morning of presentation with acutely worsening SOB, had to sit up on the side of the bed to catch is breath. He denied chest pain, palpitations, lightheadedness, dizziness, leg swelling, cough or congestion. Pt does not know how long he has been in atrial fibrillation, and the last ECG was 7/16 which was afib at that time. He has been taking eliquis, sotalol 80mg, and metoprolol. Pt afib is a chronic problem, length of time in afib is unknown (however, may be as long as >6 months duration), likely aggravated by his COPD and ISABELLE, no alleviating factors, with symptms mainly on shortness of breath . Cardiac PMH: (Old records have been reviewed and summarized below)  TTE (1/11/17): -  Left ventricle: Systolic function was normal. Ejection fraction was estimated in the range of 55 % to 60 %. This study was inadequate for the evaluation of regional wall motion. Wall thickness was mildly increased. -  Mitral valve: There was mild regurgitation. Cath (2/14): CONCLUSIONS  1. Nonobstructive coronary artery disease as described above. 2. Preserved left ventricular function. 3. Elevated left ventricular end-diastolic pressure of 28 mmHg. The  patient was treated aggressively with medical management.     Patient Active Problem List   Diagnosis Code    COPD (chronic obstructive pulmonary disease) ([facility] J44.9    Pulmonary emphysema ([facility] J43.9    GERD (gastroesophageal reflux disease) K21.9    Pure hypercholesterolemia E78.00    Impotence of organic origin N52.9    Malignant neoplasm of prostate ([facility] C61    Atrial fibrillation with RVR (HCC) I48.91    ISABELLE (obstructive sleep apnea) G47.33       Past Medical History   Diagnosis Date    Adverse effect of anesthesia      pt woke up during last colonoscopy    Arrhythmia 6/30/16     hx of a fib- \"I'm out of it\" -per Dr Marti Luis; fingers    Bronchitis, chronic ([facility] 6/30/16     hx of     Carotid stenosis      bilat carotid stenosis    Chronic obstructive pulmonary disease ([facility] 6/30/16     affirms no SOB with 1 flight of steps; no 02; scheduled meds; last used rescue inhaler 2 d ago- avg at least 1 X wk    Chronic pain      OA- ALL MAJOR JOINTS    dyslipidemia     Malignant neoplasm of prostate ([facility] 12/18/2013    [facility] D[facility]2012    Psychiatric disorder      anxiety- depression    Sleep apnea      uses CPAP      Past Surgical History   Procedure Laterality Date    Hx cyst removal      Colonoscopy      Hx prostatectomy  2/21/2013    Hx heart catheterization      Hx other surgical  1983     lumbar     Hx other surgical       pilonidal cyst     Colonoscopy N/A 7/1/2016     COLONOSCOPY performed by Gatito Aguilar MD at [facility] ENDOSCOPY     No Known Allergies   Family History   Problem Relation Age of Onset    Heart Attack Father      MI X 2 at age 61    Heart Disease Father     Hypertension Father     Stroke Father      CVA X 2- due to off anticoagulant    Cancer Mother     Kidney Disease Other     Other Other      Nephrolithiasis        Current Facility-Administered Medications   Medication Dose Route Frequency    apixaban (ELIQUIS) tablet 5 mg  5 mg Oral Q12H    levalbuterol (XOPENEX) nebulizer soln 0.63 mg/3 mL  0.63 mg Nebulization Q6H RT    ipratropium (ATROVENT) 0.02 % nebulizer solution 0.5 mg  0.5 mg Nebulization Q6H RT    budesonide (PULMICORT) 500 mcg/2 ml nebulizer suspension  500 mcg Nebulization BID RT    0.9% sodium chloride infusion  75 mL/hr IntraVENous CONTINUOUS    clonazePAM (KlonoPIN) tablet 2 mg  2 mg Oral BID    dilTIAZem (CARDIZEM) 100 mg in 0.9% sodium chloride (MBP/ADV) 100 mL infusion  0-15 mg/hr IntraVENous CONTINUOUS    magnesium oxide (MAG-OX) tablet 400 mg  400 mg Oral DAILY    thiamine (B-1) tablet 100 mg  100 mg Oral DAILY    folic acid (FOLVITE) tablet 1 mg  1 mg Oral DAILY    DULoxetine (CYMBALTA) capsule 120 mg  120 mg Oral DAILY    fenofibrate (LOFIBRA) tablet 160 mg  160 mg Oral DAILY    fluticasone (FLONASE) 50 mcg/actuation nasal spray 2 Spray  2 Spray Both Nostrils DAILY    gabapentin (NEURONTIN) capsule 200 mg  200 mg Oral BID    guaiFENesin SR (MUCINEX) tablet 1,200 mg  1,200 mg Oral Q12H    pantoprazole (PROTONIX) tablet 40 mg  40 mg Oral ACB    rosuvastatin (CRESTOR) tablet 20 mg  20 mg Oral QHS    zolpidem (AMBIEN) tablet 10 mg  10 mg Oral QHS PRN    sodium chloride (NS) flush 5-10 mL  5-10 mL IntraVENous PRN       Review of Symptoms:  A comprehensive ROS was performed with the pertinent positives and negatives mentioned in the HPI, all other systems reviewed and are negative       Physical Exam  Vitals:    01/16/17 0714 01/16/17 0832 01/16/17 0853 01/16/17 1045   BP:  123/87 122/71 119/73   Pulse:  84 82    Resp:   16    Temp:   97.8 °F (36.6 °C)    SpO2: 94%  94%    Weight:       Height:           Physical Exam:  General appearance - Alert, well appearing, and in no distress   Mental status - Affect appropriate to mood. Eyes - Sclerae anicteric,  ENMT - Hearing grossly normal bilaterally, Dental hygiene good. Neck - Carotids upstroke normal bilaterally, no bruits, no JVD. Resp - Clear to auscultation, but decreased BS  Heart - irreg irreg, distant S1, S2, no murmurs, rubs, clicks or gallops.   GI - Soft, nontender, nondistended, no masses or organomegaly. Neurological - Grossly intact - normal speech, no focal findings  Musculoskeletal - No joint tenderness, deformity or swelling  Extremities - Peripheral pulses normal, no pedal edema, no clubbing or cyanosis. Skin - Normal coloration and turgor. Psych -  oriented to person, place, and time. Cardiographics    Telemetry: afib with controlled rate  ECG (Indpendently visualized and interpreted): atrial fibrillation, rsr', controlled rate  Echocardiogram: see above    Labs:   Recent Labs      01/15/17   0612  01/14/17   0555   NA  144  143   K  3.9  3.9   MG  2.6*   --    BUN  14  11   CREA  0.99  0.98   GLU  89  95   WBC   --   8.3   HGB   --   15.5   HCT   --   46.3   PLT   --   189        Assessment:      Active Problems:    Atrial fibrillation with RVR (HCC) (1/11/2017)      ISABELLE (obstructive sleep apnea) (1/11/2017)    58 yo male with a history of COPD / emphysema, ISABELLE maintained on CPAP, GERD, prostate CA, atrial fibrillation followed by Dr. Eric Ballard who was admitted with afib with RVR and EP was consulted for management. Plan:   1. Persistent atrial fibrillation, uncontrolled: Mr. Cecilia Puri is a pleasant 62yo with a history of symptomatic atrial fibrillation and would be a good candidate for a rhythm control strategy. I had a discussion with the Pt today regarding rate and rhythm control strategies, rhythm control strategy treatment options including DCCV, antiarrhythmic therapy, catheter ablation and the combination of the above. I discussed at length the advantages and disadvantages of all treatment strategies. After discussion, will plan for ANDI guided DCCV today and will load with antiarrhythmic doses of sotalol. Pt reports he has missed some doses of his eliquis in the past few weeks and will therefore evaluate his ALICIA with ANDI prior to cardioversion. Pt will be placed on sotalol 160mg Q12H pending post cardioversion ECG. Will require telemetry and post dose QTc monitoring for 6 doses.  Pt can follow up with EP as outpatient to monitor response, could consider afib ablation in the future. 2. ISABELLE: CPAP compliance with be important for continued rhythm controls strategy. 3. COPD: per pulmonary    Thank you very much for this referral. We appreciate the opportunity to participate in this patient's care. We will follow along with above stated plan. Parul Díaz MD, MS  Cardiology/Electrophysiology

## 2017-01-16 NOTE — PROGRESS NOTES
Hospitalist Progress Note    2017  Admit Date: 2017  1:24 PM   NAME: Lisa Hartley   :  1955   MRN:  [id number]   Attending: Naseem Roman DO  PCP:  Kisha Quintero MD    SUBJECTIVE:   Patient with PMH of afib followed per Dr. Ada Mora, COPD, ISABELLE admitted with COPD exacerbation and afib with RVR. He is presently on max management currently for tachycardia and eliquis. ECHO EF 55-60%. Has been seen per pulm for COPD, antibiotics stopped and weaned to room air. BC NGTD. Using CPAP for ISABELLE and will need pulm followup. EP seeing pt with plans for procedure today. Remains afebrile off abx      Review of Systems negative with exception of pertinent positives noted above  PHYSICAL EXAM     Visit Vitals    /73 (BP 1 Location: Right arm, BP Patient Position: Post activity)    Pulse 82    Temp 97.8 °F (36.6 °C)    Resp 16    Ht 6' (1.829 m)    Wt 110.2 kg (243 lb)    SpO2 94%    BMI 32.96 kg/m2      Temp (24hrs), Av °F (36.7 °C), Min:97.7 °F (36.5 °C), Max:98.4 °F (36.9 °C)    Oxygen Therapy  O2 Sat (%): 94 % (17 0853)  Pulse via Oximetry: 56 beats per minute (17 0714)  O2 Device: Room air (17 0714)  O2 Flow Rate (L/min): 0 l/min (01/15/17 193)  FIO2 (%): 21 % (17 0214)    Intake/Output Summary (Last 24 hours) at 17 1059  Last data filed at 17 0511   Gross per 24 hour   Intake              200 ml   Output             2150 ml   Net            -1950 ml      General: No acute distress    Lungs:  bilat coarse bs.    Heart:  irregular,  No murmur, rub, or gallop  Abdomen: Soft, Non distended, Non tender, Positive bowel sounds  Extremities: No cyanosis, clubbing or edema  Neurologic:  No focal deficits    ASSESSMENT      Active Hospital Problems    Diagnosis Date Noted    Atrial fibrillation with RVR ([address] 2017    ISABELLE (obstructive sleep apnea) 2017     Plan:  ·  continue eliquis  · EP for procedure today  · Remains afebrile off abx, stable on room air  · Continue PT. Home vs rehab  · Appreciate specialist input.      DVT Prophylaxis: elquis     Signed By: Primitivo Fermin MD     January 16, 2017

## 2017-01-17 LAB
ANION GAP BLD CALC-SCNC: 9 MMOL/L (ref 7–16)
ATRIAL RATE: 271 BPM
BUN SERPL-MCNC: 13 MG/DL (ref 8–23)
CALCIUM SERPL-MCNC: 8.8 MG/DL (ref 8.3–10.4)
CALCULATED P AXIS, ECG09: NORMAL DEGREES
CALCULATED R AXIS, ECG10: 56 DEGREES
CALCULATED T AXIS, ECG11: 61 DEGREES
CHLORIDE SERPL-SCNC: 106 MMOL/L (ref 98–107)
CO2 SERPL-SCNC: 28 MMOL/L (ref 21–32)
CREAT SERPL-MCNC: 1.21 MG/DL (ref 0.8–1.5)
DIAGNOSIS, 93000: NORMAL
DIASTOLIC BP, ECG02: NORMAL MMHG
GLUCOSE SERPL-MCNC: 89 MG/DL (ref 65–100)
MAGNESIUM SERPL-MCNC: 2.6 MG/DL (ref 1.8–2.4)
P-R INTERVAL, ECG05: NORMAL MS
POTASSIUM SERPL-SCNC: 4 MMOL/L (ref 3.5–5.1)
Q-T INTERVAL, ECG07: 370 MS
QRS DURATION, ECG06: 76 MS
QTC CALCULATION (BEZET), ECG08: 434 MS
SODIUM SERPL-SCNC: 143 MMOL/L (ref 136–145)
SYSTOLIC BP, ECG01: NORMAL MMHG
VENTRICULAR RATE, ECG03: 83 BPM

## 2017-01-17 PROCEDURE — 74011250637 HC RX REV CODE- 250/637: Performed by: INTERNAL MEDICINE

## 2017-01-17 PROCEDURE — 83735 ASSAY OF MAGNESIUM: CPT | Performed by: INTERNAL MEDICINE

## 2017-01-17 PROCEDURE — 80048 BASIC METABOLIC PNL TOTAL CA: CPT | Performed by: INTERNAL MEDICINE

## 2017-01-17 PROCEDURE — 74011000250 HC RX REV CODE- 250: Performed by: INTERNAL MEDICINE

## 2017-01-17 PROCEDURE — 74011000250 HC RX REV CODE- 250: Performed by: NURSE PRACTITIONER

## 2017-01-17 PROCEDURE — 94760 N-INVAS EAR/PLS OXIMETRY 1: CPT

## 2017-01-17 PROCEDURE — 94640 AIRWAY INHALATION TREATMENT: CPT

## 2017-01-17 PROCEDURE — 93005 ELECTROCARDIOGRAM TRACING: CPT | Performed by: INTERNAL MEDICINE

## 2017-01-17 PROCEDURE — 65660000000 HC RM CCU STEPDOWN

## 2017-01-17 PROCEDURE — 36415 COLL VENOUS BLD VENIPUNCTURE: CPT | Performed by: INTERNAL MEDICINE

## 2017-01-17 RX ADMIN — DULOXETINE HYDROCHLORIDE 120 MG: 60 CAPSULE, DELAYED RELEASE ORAL at 08:19

## 2017-01-17 RX ADMIN — FOLIC ACID 1 MG: 1 TABLET ORAL at 08:19

## 2017-01-17 RX ADMIN — GABAPENTIN 200 MG: 100 CAPSULE ORAL at 08:19

## 2017-01-17 RX ADMIN — IPRATROPIUM BROMIDE 0.5 MG: 0.5 SOLUTION RESPIRATORY (INHALATION) at 07:53

## 2017-01-17 RX ADMIN — CLONAZEPAM 2 MG: 1 TABLET ORAL at 20:13

## 2017-01-17 RX ADMIN — Medication 400 MG: at 08:19

## 2017-01-17 RX ADMIN — FENOFIBRATE 160 MG: 160 TABLET ORAL at 08:19

## 2017-01-17 RX ADMIN — ROSUVASTATIN CALCIUM 20 MG: 20 TABLET, FILM COATED ORAL at 22:14

## 2017-01-17 RX ADMIN — IPRATROPIUM BROMIDE 0.5 MG: 0.5 SOLUTION RESPIRATORY (INHALATION) at 01:36

## 2017-01-17 RX ADMIN — APIXABAN 5 MG: 5 TABLET, FILM COATED ORAL at 20:13

## 2017-01-17 RX ADMIN — Medication 5 ML: at 17:55

## 2017-01-17 RX ADMIN — FLUTICASONE PROPIONATE 2 SPRAY: 50 SPRAY, METERED NASAL at 08:26

## 2017-01-17 RX ADMIN — IPRATROPIUM BROMIDE 0.5 MG: 0.5 SOLUTION RESPIRATORY (INHALATION) at 23:39

## 2017-01-17 RX ADMIN — GUAIFENESIN 1200 MG: 600 TABLET, EXTENDED RELEASE ORAL at 08:19

## 2017-01-17 RX ADMIN — SOTALOL HYDROCHLORIDE 160 MG: 80 TABLET ORAL at 17:52

## 2017-01-17 RX ADMIN — CLONAZEPAM 2 MG: 1 TABLET ORAL at 08:19

## 2017-01-17 RX ADMIN — Medication 10 ML: at 22:16

## 2017-01-17 RX ADMIN — ZOLPIDEM TARTRATE 10 MG: 5 TABLET, FILM COATED ORAL at 22:15

## 2017-01-17 RX ADMIN — GABAPENTIN 200 MG: 100 CAPSULE ORAL at 20:13

## 2017-01-17 RX ADMIN — PANTOPRAZOLE SODIUM 40 MG: 40 TABLET, DELAYED RELEASE ORAL at 06:00

## 2017-01-17 RX ADMIN — LEVALBUTEROL HYDROCHLORIDE 0.63 MG: 0.63 SOLUTION RESPIRATORY (INHALATION) at 13:40

## 2017-01-17 RX ADMIN — SOTALOL HYDROCHLORIDE 160 MG: 80 TABLET ORAL at 06:00

## 2017-01-17 RX ADMIN — LEVALBUTEROL HYDROCHLORIDE 0.63 MG: 0.63 SOLUTION RESPIRATORY (INHALATION) at 19:39

## 2017-01-17 RX ADMIN — LEVALBUTEROL HYDROCHLORIDE 0.63 MG: 0.63 SOLUTION RESPIRATORY (INHALATION) at 01:36

## 2017-01-17 RX ADMIN — APIXABAN 5 MG: 5 TABLET, FILM COATED ORAL at 08:19

## 2017-01-17 RX ADMIN — GUAIFENESIN 1200 MG: 600 TABLET, EXTENDED RELEASE ORAL at 20:13

## 2017-01-17 RX ADMIN — IPRATROPIUM BROMIDE 0.5 MG: 0.5 SOLUTION RESPIRATORY (INHALATION) at 13:40

## 2017-01-17 RX ADMIN — BUDESONIDE 500 MCG: 0.5 INHALANT RESPIRATORY (INHALATION) at 07:53

## 2017-01-17 RX ADMIN — LEVALBUTEROL HYDROCHLORIDE 0.63 MG: 0.63 SOLUTION RESPIRATORY (INHALATION) at 07:53

## 2017-01-17 RX ADMIN — Medication 100 MG: at 08:19

## 2017-01-17 RX ADMIN — Medication 10 ML: at 06:11

## 2017-01-17 RX ADMIN — BUDESONIDE 500 MCG: 0.5 INHALANT RESPIRATORY (INHALATION) at 19:39

## 2017-01-17 RX ADMIN — IPRATROPIUM BROMIDE 0.5 MG: 0.5 SOLUTION RESPIRATORY (INHALATION) at 19:39

## 2017-01-17 RX ADMIN — LEVALBUTEROL HYDROCHLORIDE 0.63 MG: 0.63 SOLUTION RESPIRATORY (INHALATION) at 23:39

## 2017-01-17 NOTE — PROGRESS NOTES
Hospitalist Progress Note    2017  Admit Date: 2017  1:24 PM   NAME: Lisa Hartley   :  1955   MRN:  [id number]   Attending: Naseem Roman DO  PCP:  Kisha Quintero MD    SUBJECTIVE:   Patient with PMH of afib followed per Dr. Ada Mora, COPD, ISABELLE admitted with COPD exacerbation and afib with RVR. He is presently on max management currently for tachycardia and eliquis. ECHO EF 55-60%. Has been seen per pulm for COPD, antibiotics stopped and weaned to room air. BC NGTD. Using CPAP for ISABELLE and will need pulm followup. EP seeing pt with plans for procedure today. Remains afebrile off abx    :  Pt underwent ANDI cardiovert on , unfortunately pt reverted back to afib. Now on sotalol. Pt is comfortable. Denies cp, sob      Review of Systems negative with exception of pertinent positives noted above  PHYSICAL EXAM     Visit Vitals    /59    Pulse 78    Temp 97.2 °F (36.2 °C)    Resp 20    Ht 6' (1.829 m)    Wt 109.1 kg (240 lb 9.6 oz)    SpO2 95%    BMI 32.63 kg/m2      Temp (24hrs), Av.6 °F (36.4 °C), Min:97.2 °F (36.2 °C), Max:98.1 °F (36.7 °C)    Oxygen Therapy  O2 Sat (%): 95 % (17 0754)  Pulse via Oximetry: 128 beats per minute (17 0754)  O2 Device: Room air (17 0754)  O2 Flow Rate (L/min): 0 l/min (01/15/17 1934)  FIO2 (%): 21 % (17 0214)    Intake/Output Summary (Last 24 hours) at 17 1114  Last data filed at 17 0458   Gross per 24 hour   Intake           1161.5 ml   Output             2050 ml   Net           -888.5 ml      General: No acute distress    Lungs:  bilat coarse bs.    Heart:  irregular,  No murmur, rub, or gallop  Abdomen: Soft, Non distended, Non tender, Positive bowel sounds  Extremities: No cyanosis, clubbing or edema  Neurologic:  No focal deficits    ASSESSMENT      Active Hospital Problems    Diagnosis Date Noted    Atrial fibrillation with RVR ([facility] 2017    ISABELLE (obstructive sleep apnea) 2017 Plan:  ·  continue eliquis  · Sotalol started. EP planning reattempt at cardiovert on Wednesday   · Remains afebrile off abx, stable on room air  · Continue PT. Home vs rehab  · Appreciate specialist input.      DVT Prophylaxis: ana     Signed By: Maria Eugenia Shay MD     January 17, 2017

## 2017-01-17 NOTE — PROGRESS NOTES
[facility] CARDIOLOGY PROGRESS NOTE           1/17/2017 7:10 AM    Admit Date: 1/11/2017    Admit Diagnosis: Sepsis ([address] Community acquired bacterial pneumonia; Atrial *      Subjective:   Patient remains in A. Fib. Stable Rates around 100      Objective:     Vitals:    01/16/17 2126 01/17/17 0059 01/17/17 0136 01/17/17 0458   BP: 118/67 144/88  131/77   Pulse: (!) 116 (!) 114  (!) 112   Resp: 18 18 18   Temp: 97.8 °F (36.6 °C) 97.4 °F (36.3 °C)  97.4 °F (36.3 °C)   SpO2: 95% 95% 93% 96%   Weight:    240 lb 9.6 oz (109.1 kg)   Height:           Physical Exam:  General-Well Developed, Well Nourished, No Acute Distress, Alert & Oriented x 3, appropriate mood. Neck- supple, no JVD  CV- irregular rate and rhythm no MRG  Lung- clear bilaterally  Abd- soft, nontender, nondistended  Ext- no edema bilaterally.   Skin- warm and dry    Current Facility-Administered Medications   Medication Dose Route Frequency    sodium chloride (NS) flush 5-10 mL  5-10 mL IntraVENous Q8H    sotalol (BETAPACE) tablet 160 mg  160 mg Oral Q12H    apixaban (ELIQUIS) tablet 5 mg  5 mg Oral Q12H    levalbuterol (XOPENEX) nebulizer soln 0.63 mg/3 mL  0.63 mg Nebulization Q6H RT    ipratropium (ATROVENT) 0.02 % nebulizer solution 0.5 mg  0.5 mg Nebulization Q6H RT    budesonide (PULMICORT) 500 mcg/2 ml nebulizer suspension  500 mcg Nebulization BID RT    0.9% sodium chloride infusion  75 mL/hr IntraVENous CONTINUOUS    clonazePAM (KlonoPIN) tablet 2 mg  2 mg Oral BID    dilTIAZem (CARDIZEM) 100 mg in 0.9% sodium chloride (MBP/ADV) 100 mL infusion  0-15 mg/hr IntraVENous CONTINUOUS    magnesium oxide (MAG-OX) tablet 400 mg  400 mg Oral DAILY    thiamine (B-1) tablet 100 mg  100 mg Oral DAILY    folic acid (FOLVITE) tablet 1 mg  1 mg Oral DAILY    DULoxetine (CYMBALTA) capsule 120 mg  120 mg Oral DAILY    fenofibrate (LOFIBRA) tablet 160 mg  160 mg Oral DAILY    fluticasone (FLONASE) 50 mcg/actuation nasal spray 2 Spray 2 [place] Both Nostrils DAILY    gabapentin (NEURONTIN) capsule 200 mg  200 mg Oral BID    guaiFENesin SR (MUCINEX) tablet 1,200 mg  1,200 mg Oral Q12H    pantoprazole (PROTONIX) tablet 40 mg  40 mg Oral ACB    rosuvastatin (CRESTOR) tablet 20 mg  20 mg Oral QHS    zolpidem (AMBIEN) tablet 10 mg  10 mg Oral QHS PRN    sodium chloride (NS) flush 5-10 mL  5-10 mL IntraVENous PRN     Data Review:   Recent Results (from the past 24 hour(s))   EKG, 12 LEAD, INITIAL    Collection Time: 01/16/17  7:53 AM   Result Value Ref Range    Systolic BP  mmHg    Diastolic BP  mmHg    Ventricular Rate 82 BPM    Atrial Rate 120 BPM    P-R Interval  ms    QRS Duration 82 ms    Q-T Interval 394 ms    QTC Calculation (Bezet) 460 ms    Calculated P Axis  degrees    Calculated R Axis 61 degrees    Calculated T Axis 55 degrees    Diagnosis       Atrial fibrillation  Abnormal ECG  Confirmed by MATTEO RAMSEY (), HARJEET JAQUEZ ([id number]) on 1/16/2017 8:12:28 AM     EKG, 12 LEAD, INITIAL    Collection Time: 01/16/17  8:14 PM   Result Value Ref Range    Systolic BP  mmHg    Diastolic BP  mmHg    Ventricular Rate 97 BPM    Atrial Rate 138 BPM    P-R Interval  ms    QRS Duration 90 ms    Q-T Interval 350 ms    QTC Calculation (Bezet) 444 ms    Calculated P Axis  degrees    Calculated R Axis 57 degrees    Calculated T Axis 51 degrees    Diagnosis       Atrial fibrillation  Abnormal ECG  Confirmed by ST SHERRY SHIELDS MD (), YORDAN GERARDO ([id number]) on 1/16/2017 9:44:31 PM     MAGNESIUM    Collection Time: 01/17/17  5:05 AM   Result Value Ref Range    Magnesium 2.6 (H) 1.8 - 2.4 mg/dL   METABOLIC PANEL, BASIC    Collection Time: 01/17/17  5:05 AM   Result Value Ref Range    Sodium 143 136 - 145 mmol/L    Potassium 4.0 3.5 - 5.1 mmol/L    Chloride 106 98 - 107 mmol/L    CO2 28 21 - 32 mmol/L    Anion gap 9 7 - 16 mmol/L    Glucose 89 65 - 100 mg/dL    BUN 13 8 - 23 MG/DL    Creatinine 1.21 0.8 - 1.5 MG/DL    GFR est AA >60 >60 ml/min/1.73m2    GFR est non-AA >60 >60 ml/min/1.73m2    Calcium 8.8 8.3 - 10.4 MG/DL     Assessment:     Active Problems:    Atrial fibrillation with RVR (HCC) (1/11/2017)      ISABELLE (obstructive sleep apnea) (1/11/2017)      Plan:   1. A. Fib - Periods of RVR - Stage of A. Fib unknown. Able to restore but not maintain NSR yesterday. Sotalol has been started, plan to repeat CV on Wed with Dr. Lucrecia Sullivan. On Eliquis  2. Sotalol start - Monitoring HR and Qtc per protocol. Javier Neriimer. Barrie Gray M.D., F.A.C.C, F.H.R.S.   Cardiology/Electrophysiology

## 2017-01-17 NOTE — PROGRESS NOTES
Care Management Interventions  PCP Verified by CM: Yes  Transition of Care Consult (CM Consult): Home Health, Discharge Loi Humphreys  [address] [address]: Yes  Current Support Network: Lives with Spouse  Confirm Follow Up Transport: Family  Plan discussed with Pt/Family/Caregiver: Yes  Freedom of Choice Offered: Yes  Discharge Location  Discharge Placement: Home with home health      met with patient/ family to complete discharge planning. Patient was alert and oriented in all spheres. Pt lives with spouse. Patient stated to be independent with all ADLs at baseline and uses no DME/Oxygen. Patient continues to drive. Patient described having good family support. Patient has sufficient prescription drug coverage/ Medicare Part D. Pt is receptive to referral for [facility] at discharge. Pt expressed need for a new nebulizer at discharge. No other needs expressed by patient or family. We will plan to order a nebulizer for patient at D/C in addition to [facility].

## 2017-01-17 NOTE — ADT AUTH CERT NOTES
Utilization Review           Atrial Fibrillation - Care Day 4 (1/14/2017) by Portia Traore RN        Review Entered Review Status       1/16/2017 Completed       Details              Care Day: 4 Care Date: 1/14/2017 Level of Care: Telemetry       Guideline Day 2        Clinical Status       ( ) * Hemodynamic stability       ( ) * Sinus rhythm or acceptable ventricular rate       (X) * No evidence of myocardial ischemia       (X) * Anticoagulation regimen for next level of care established or not necessary       ( ) * Discharge plans and education understood              Activity       (X) * Ambulatory              Routes       (X) * Oral hydration, diet, and medications              Interventions       (X) Cardiac monitoring              Medications       (X) Possible medication for rhythm or rate control                                   * Milestone              Additional Notes       CONT STAY REVIEW FOR  1/14/17       Hospitalist Progress Note         1/14/2017 11:45 AM       SUBJECTIVE:       Mr. Lolly Mena is a 58 yo WM with PMH of afib, COPD, ISABELLE admitted with COPD exacerbation and afib with RVR. He is followed per cardio Dr. Anastasiia Josue, presently on max management currently for tachycardia and eliquis. ECHO EF 55-60%. Has been seen per pulm for COPD, antibiotics stopped and weaned to room air. BC NGTD. Using CPAP for ISABELLE and will need pulm followup.  Plans for home at discharge        1-14 no anorexia, denies dyspnea or cough, no chest pain or palpitation        General: No acute distress,conversive       Lungs:  CTAB, good effort        Heart:  Regular rate and irregular rhythm, no murmur, no edema        Abdomen: Soft, nontender and nondistended, normal bowel sounds       Extremities: Warm and dry       ASSESSMENT / PLAN                      Active Hospital Problems         Diagnosis Date Noted       o Atrial fibrillation with RVR ([address] 01/11/2017       o ISABELLE (obstructive sleep apnea) 01/11/2017               -looking to cardiology for afib management, nursing to contact Dr. Rosario Simental        -stable COPD and ISABELLE with outpatient followup planned        -home when stable        FEN:oral,IVF       DVT Prophylaxis: eliquis       Disposition:pending       , TELE, EP/OT/PT/CM CONSULTS              CARDIAC DIET, NS 75/HR, ELIQUIS, CARDIZEM IV/DRIP,       ATROVENT NEBS, XOPENEX NEBS, LOPRESSOR PO, CRESTOR, PULMACORT NEBS,        BETAPACE,                          Atrial Fibrillation - Care Day 3 (1/13/2017) by Abner Sandoval RN        Review Entered Review Status       1/13/2017 Completed       Details              Care Day: 3 Care Date: 1/13/2017 Level of Care: Telemetry       Guideline Day 2        Clinical Status       ( ) * Hemodynamic stability       ( ) * Sinus rhythm or acceptable ventricular rate       (X) * No evidence of myocardial ischemia       (X) * Anticoagulation regimen for next level of care established or not necessary       ( ) * Discharge plans and education understood              Activity       (X) * Ambulatory              Routes       (X) * Oral hydration, diet, and medications              Interventions       (X) Cardiac monitoring              Medications       (X) Possible medication for rhythm or rate control                                   * Milestone              Additional Notes       CONT STAY REVIEW FOR  1/13/17       Hospitalist Progress Note       Subjective:               57year old CM with a PMH of COPD and PAF presented to the ER with acute on chronic shortness of breath, multiple syncopal episodes over the past 4 days, and diaphresis with chills. He woke up at 0430 and felt short of breath so he sat on the side of the bed \"with my hands on my knees\". The SOB persisted through the morning so he went to the local fire dept and they took his vital signs and noticed that his HR was in the 140s so he came to the ER.               1/13/16 - Today the patient feels better.  Had some anxiety last evening. Denies CP/SOB. No changes in cough. Denies F/C/N/V.       Plan:               - Give Cardizem 10mg push. Increase drip to 15mg/hr.       - Consult EP due to persistent/resistant Afib on multiple medications       - Continue Sotalol       - Continue Klonopin       - Stop Ceftriaxone. Continue Azithromycin.       - Continue aerosols.  Mucinex.       - Appreciate cardiology & pulmonary's input and assistance       Called and discussed updates with Dr. Ada Mora.       He does not feel a need for an EP consult at this point due to him knowing the patient for 15 years.       He agreed with bolusing Cardizem and uptitrating the Cardizem gtt.       Add Magnesium Sulfate 1g IV daily.       Start normal saline @ 75 ml/hr.       All per Dr. Guerline Brantley recommendations.       Magnesium: 2.5 (H)         ,   CARDIAC DIET, NS 75/HR, ELIQUIS, CARDIZEM IV/DRIP,       ATROVENT NEBS, XOPENEX NEBS, LOPRESSOR PO, CRESTOR, PULMACORT MEBS,        BETAPACE, ZITHROMAX IV QD, KLONOPIN PO BID , MAG SULFATE IV

## 2017-01-17 NOTE — PROGRESS NOTES
Verbal bedside report given to Herminia Villeda oncoming RN. Patient's situation, background, assessment and recommendations provided. Opportunity for questions provided. Oncoming RN assumed care of patient.

## 2017-01-17 NOTE — PROGRESS NOTES
Notified Teresa Selby RN of MEWS 3 due to increased HR. Pt afib on tele.  Will continue to monitor patient

## 2017-01-17 NOTE — PROGRESS NOTES
Notified per pt's primary RN, Lexie Jovel, pt with MEWS 3 due to . Betapace loading & pt received first dose this evening at 1823. Will continue to monitor.

## 2017-01-17 NOTE — PROGRESS NOTES
Problem: Breathing Pattern - Ineffective  Goal: *Absence of hypoxia  Outcome: Progressing Towards Goal  Pt sating 95% on room air. BBS diminished.

## 2017-01-18 LAB
ANION GAP BLD CALC-SCNC: 10 MMOL/L (ref 7–16)
ATRIAL RATE: 72 BPM
ATRIAL RATE: 76 BPM
BUN SERPL-MCNC: 15 MG/DL (ref 8–23)
CALCIUM SERPL-MCNC: 8.9 MG/DL (ref 8.3–10.4)
CALCULATED P AXIS, ECG09: 60 DEGREES
CALCULATED P AXIS, ECG09: 66 DEGREES
CALCULATED R AXIS, ECG10: 59 DEGREES
CALCULATED R AXIS, ECG10: 60 DEGREES
CALCULATED T AXIS, ECG11: 52 DEGREES
CALCULATED T AXIS, ECG11: 63 DEGREES
CHLORIDE SERPL-SCNC: 106 MMOL/L (ref 98–107)
CO2 SERPL-SCNC: 27 MMOL/L (ref 21–32)
CREAT SERPL-MCNC: 1.15 MG/DL (ref 0.8–1.5)
DIAGNOSIS, 93000: NORMAL
DIAGNOSIS, 93000: NORMAL
DIASTOLIC BP, ECG02: NORMAL MMHG
DIASTOLIC BP, ECG02: NORMAL MMHG
GLUCOSE SERPL-MCNC: 84 MG/DL (ref 65–100)
MAGNESIUM SERPL-MCNC: 2.6 MG/DL (ref 1.8–2.4)
P-R INTERVAL, ECG05: 178 MS
P-R INTERVAL, ECG05: 180 MS
POTASSIUM SERPL-SCNC: 4.1 MMOL/L (ref 3.5–5.1)
Q-T INTERVAL, ECG07: 422 MS
Q-T INTERVAL, ECG07: 438 MS
QRS DURATION, ECG06: 92 MS
QRS DURATION, ECG06: 94 MS
QTC CALCULATION (BEZET), ECG08: 474 MS
QTC CALCULATION (BEZET), ECG08: 479 MS
SODIUM SERPL-SCNC: 143 MMOL/L (ref 136–145)
SYSTOLIC BP, ECG01: NORMAL MMHG
SYSTOLIC BP, ECG01: NORMAL MMHG
VENTRICULAR RATE, ECG03: 72 BPM
VENTRICULAR RATE, ECG03: 76 BPM

## 2017-01-18 PROCEDURE — 83735 ASSAY OF MAGNESIUM: CPT | Performed by: INTERNAL MEDICINE

## 2017-01-18 PROCEDURE — 74011250637 HC RX REV CODE- 250/637: Performed by: INTERNAL MEDICINE

## 2017-01-18 PROCEDURE — 94760 N-INVAS EAR/PLS OXIMETRY 1: CPT

## 2017-01-18 PROCEDURE — C8929 TTE W OR WO FOL WCON,DOPPLER: HCPCS

## 2017-01-18 PROCEDURE — 74011000250 HC RX REV CODE- 250: Performed by: INTERNAL MEDICINE

## 2017-01-18 PROCEDURE — 93005 ELECTROCARDIOGRAM TRACING: CPT | Performed by: INTERNAL MEDICINE

## 2017-01-18 PROCEDURE — 74011000250 HC RX REV CODE- 250: Performed by: NURSE PRACTITIONER

## 2017-01-18 PROCEDURE — 94640 AIRWAY INHALATION TREATMENT: CPT

## 2017-01-18 PROCEDURE — 80048 BASIC METABOLIC PNL TOTAL CA: CPT | Performed by: INTERNAL MEDICINE

## 2017-01-18 PROCEDURE — 65660000000 HC RM CCU STEPDOWN

## 2017-01-18 PROCEDURE — 36415 COLL VENOUS BLD VENIPUNCTURE: CPT | Performed by: INTERNAL MEDICINE

## 2017-01-18 PROCEDURE — 77030018846 HC SOL IRR STRL H20 ICUM -A

## 2017-01-18 PROCEDURE — 93312 ECHO TRANSESOPHAGEAL: CPT

## 2017-01-18 RX ADMIN — Medication 10 ML: at 06:19

## 2017-01-18 RX ADMIN — DULOXETINE HYDROCHLORIDE 120 MG: 60 CAPSULE, DELAYED RELEASE ORAL at 09:21

## 2017-01-18 RX ADMIN — APIXABAN 5 MG: 5 TABLET, FILM COATED ORAL at 09:21

## 2017-01-18 RX ADMIN — IPRATROPIUM BROMIDE 0.5 MG: 0.5 SOLUTION RESPIRATORY (INHALATION) at 14:26

## 2017-01-18 RX ADMIN — SOTALOL HYDROCHLORIDE 160 MG: 80 TABLET ORAL at 17:40

## 2017-01-18 RX ADMIN — FOLIC ACID 1 MG: 1 TABLET ORAL at 09:21

## 2017-01-18 RX ADMIN — BUDESONIDE 500 MCG: 0.5 INHALANT RESPIRATORY (INHALATION) at 19:15

## 2017-01-18 RX ADMIN — LEVALBUTEROL HYDROCHLORIDE 0.63 MG: 0.63 SOLUTION RESPIRATORY (INHALATION) at 09:20

## 2017-01-18 RX ADMIN — Medication 400 MG: at 09:21

## 2017-01-18 RX ADMIN — APIXABAN 5 MG: 5 TABLET, FILM COATED ORAL at 21:49

## 2017-01-18 RX ADMIN — CLONAZEPAM 2 MG: 1 TABLET ORAL at 09:22

## 2017-01-18 RX ADMIN — PANTOPRAZOLE SODIUM 40 MG: 40 TABLET, DELAYED RELEASE ORAL at 06:31

## 2017-01-18 RX ADMIN — GABAPENTIN 200 MG: 100 CAPSULE ORAL at 09:21

## 2017-01-18 RX ADMIN — FENOFIBRATE 160 MG: 160 TABLET ORAL at 09:21

## 2017-01-18 RX ADMIN — ROSUVASTATIN CALCIUM 20 MG: 20 TABLET, FILM COATED ORAL at 21:49

## 2017-01-18 RX ADMIN — Medication 10 ML: at 21:51

## 2017-01-18 RX ADMIN — GUAIFENESIN 1200 MG: 600 TABLET, EXTENDED RELEASE ORAL at 09:21

## 2017-01-18 RX ADMIN — BUDESONIDE 500 MCG: 0.5 INHALANT RESPIRATORY (INHALATION) at 09:20

## 2017-01-18 RX ADMIN — CLONAZEPAM 2 MG: 1 TABLET ORAL at 21:49

## 2017-01-18 RX ADMIN — IPRATROPIUM BROMIDE 0.5 MG: 0.5 SOLUTION RESPIRATORY (INHALATION) at 19:15

## 2017-01-18 RX ADMIN — Medication 100 MG: at 09:26

## 2017-01-18 RX ADMIN — FLUTICASONE PROPIONATE 2 SPRAY: 50 SPRAY, METERED NASAL at 09:00

## 2017-01-18 RX ADMIN — SOTALOL HYDROCHLORIDE 160 MG: 80 TABLET ORAL at 06:17

## 2017-01-18 RX ADMIN — IPRATROPIUM BROMIDE 0.5 MG: 0.5 SOLUTION RESPIRATORY (INHALATION) at 09:20

## 2017-01-18 RX ADMIN — GUAIFENESIN 1200 MG: 600 TABLET, EXTENDED RELEASE ORAL at 21:49

## 2017-01-18 RX ADMIN — LEVALBUTEROL HYDROCHLORIDE 0.63 MG: 0.63 SOLUTION RESPIRATORY (INHALATION) at 19:15

## 2017-01-18 RX ADMIN — ZOLPIDEM TARTRATE 10 MG: 5 TABLET, FILM COATED ORAL at 21:48

## 2017-01-18 RX ADMIN — GABAPENTIN 200 MG: 100 CAPSULE ORAL at 21:49

## 2017-01-18 RX ADMIN — LEVALBUTEROL HYDROCHLORIDE 0.63 MG: 0.63 SOLUTION RESPIRATORY (INHALATION) at 14:26

## 2017-01-18 NOTE — PROGRESS NOTES
Hospitalist Progress Note    2017  Admit Date: 2017  1:24 PM   NAME: Sarmad Chan   :  1955   MRN:  [id number]   Attending: Esa Cortes DO  PCP:  Rosanne Méndez MD    SUBJECTIVE:   Patient with PMH of afib followed per Dr. Kathleen Francisco, COPD, ISABELLE admitted with COPD exacerbation and afib with RVR. He is presently on max management currently for tachycardia and eliquis. ECHO EF 55-60%. Has been seen per pulm for COPD, antibiotics stopped and weaned to room air. BC NGTD. Using CPAP for ISABELLE and will need pulm followup. Underwent ANDI cardiovert on  unfortunately reverted back to afib. Now loading on sotalol and has converted to NSR    :  Feeling well. Feels breathing has improved. Review of Systems negative with exception of pertinent positives noted above  PHYSICAL EXAM     Visit Vitals    BP (!) 155/93 (BP 1 Location: Left arm, BP Patient Position: Head of bed elevated (Comment degrees))  Comment: Sitting up eating    Pulse 73    Temp 97.9 °F (36.6 °C)    Resp 16    Ht 6' (1.829 m)    Wt 109.1 kg (240 lb 9.6 oz)    SpO2 94%    BMI 32.63 kg/m2      Temp (24hrs), Av.8 °F (36.6 °C), Min:97.5 °F (36.4 °C), Max:98.1 °F (36.7 °C)    Oxygen Therapy  O2 Sat (%): 94 % (17 1233)  Pulse via Oximetry: 75 beats per minute (17 0920)  O2 Device: Room air (17 0920)  PEEP/CPAP (cm H2O):  (home machine) (17)  O2 Flow Rate (L/min): 0 l/min (01/15/17 193)  FIO2 (%): 21 % (17)    Intake/Output Summary (Last 24 hours) at 17 1342  Last data filed at 17 1308   Gross per 24 hour   Intake              850 ml   Output                0 ml   Net              850 ml      General: No acute distress    Lungs:  bilat coarse bs.    Heart:  irregular,  No murmur, rub, or gallop  Abdomen: Soft, Non distended, Non tender, Positive bowel sounds  Extremities: No cyanosis, clubbing or edema  Neurologic:  No focal deficits    ASSESSMENT      Active Hospital Problems    Diagnosis Date Noted    Atrial fibrillation with RVR ([facility] 01/11/2017    ISABELLE (obstructive sleep apnea) 01/11/2017     Plan:  ·  continue eliquis  · Continue sotalol, monitor EKG  · cpap at night  · Remains afebrile off abx, stable on room air  · Continue PT. Home vs rehab  · Appreciate specialist input.      DVT Prophylaxis: elquis     Signed By: Vilma Coates MD     January 18, 2017

## 2017-01-18 NOTE — PROGRESS NOTES
Bedside and Verbal shift change report given to self (oncoming nurse) by Betsy Jovel (offgoing nurse). Report included the following information SBAR, Kardex, MAR and Recent Results.

## 2017-01-18 NOTE — PROGRESS NOTES
Problem: Nutrition Deficit  Goal: *Optimize nutritional status  Nutrition LOS Note: day 7   Assessment  Diet order(s): cardiac  Food,Nutrition, and Pertinent History: Patient with h/o COPD, A Fib, admitted with COPD exacerbation. Patient reports eating well (~100% of meals). No nutrition related questions/concerns. Anthropometrics: Height: 6' (182.9 cm), Weight Source: Standing scale (comment), Weight: 109.1 kg (240 lb 9.6 oz), Body mass index is 32.63 kg/(m^2). BMI class of obesity class I. Macronutrient Needs:  · EER:  9075-2047 kcal /day (18-20 kcal/kg listed BW)  · EPR:  65-81 grams protein/day (0.8-1 grams/kg IBW)  Intake/Comparative Standards:  Average intake for past 7 day(s)/11 recorded meal(s): 95%. This potentially meets ~90% of kcal and ~100% of protein needs. Nutrition Diagnosis: No nutrition diagnosis at this time     Intervention: Meals and snacks: Continue current diet.      Curtis Carter, [address], [address] [place] [address], [phone]

## 2017-01-18 NOTE — PROGRESS NOTES
Bedside and Verbal shift change report given to RALEIGH Castellanos (oncoming nurse) by self (offgoing nurse). Report included the following information SBAR, Kardex, MAR and Recent Results.

## 2017-01-18 NOTE — PROGRESS NOTES
[facility] CARDIOLOGY PROGRESS NOTE           1/18/2017 8:25 AM    Admit Date: 1/11/2017    Admit Diagnosis: Sepsis ([address] Community acquired bacterial pneumonia; Atrial *      Subjective:   No complaints this AM, no chest pain or shortness of breath    Interval History: (History of pertinent interval events obtained from nursing staff)  Converted to NSR on sotalol    ROS:  GEN:  No fever or chills  Cardiovascular:  As noted above  Pulmonary:  As noted above  Neuro:  No new focal motor or sensory loss      Objective:     Vitals:    01/17/17 2339 01/18/17 0018 01/18/17 0518 01/18/17 0757   BP:  109/59 111/70 124/84   Pulse:  71 64 71   Resp:  18 18 16   Temp:  97.8 °F (36.6 °C) 97.5 °F (36.4 °C) 97.7 °F (36.5 °C)   SpO2: 95% 94% 97% 94%   Weight:   109.1 kg (240 lb 9.6 oz)    Height:           Physical Exam:  General-Well Developed, Well Nourished, No Acute Distress, Alert & Oriented x 3, appropriate mood. Neck- supple, no JVD  CV- regular rate and rhythm, distant S1, S2  Lung- clear bilaterally  Abd- soft, nontender, nondistended  Ext- no edema bilaterally.   Skin- warm and dry    Current Facility-Administered Medications   Medication Dose Route Frequency    sodium chloride (NS) flush 5-10 mL  5-10 mL IntraVENous Q8H    sotalol (BETAPACE) tablet 160 mg  160 mg Oral Q12H    apixaban (ELIQUIS) tablet 5 mg  5 mg Oral Q12H    levalbuterol (XOPENEX) nebulizer soln 0.63 mg/3 mL  0.63 mg Nebulization Q6H RT    ipratropium (ATROVENT) 0.02 % nebulizer solution 0.5 mg  0.5 mg Nebulization Q6H RT    budesonide (PULMICORT) 500 mcg/2 ml nebulizer suspension  500 mcg Nebulization BID RT    0.9% sodium chloride infusion  75 mL/hr IntraVENous CONTINUOUS    clonazePAM (KlonoPIN) tablet 2 mg  2 mg Oral BID    dilTIAZem (CARDIZEM) 100 mg in 0.9% sodium chloride (MBP/ADV) 100 mL infusion  0-15 mg/hr IntraVENous CONTINUOUS    magnesium oxide (MAG-OX) tablet 400 mg  400 mg Oral DAILY    thiamine (B-1) tablet 100 mg  100 mg Oral DAILY    folic acid (FOLVITE) tablet 1 mg  1 mg Oral DAILY    DULoxetine (CYMBALTA) capsule 120 mg  120 mg Oral DAILY    fenofibrate (LOFIBRA) tablet 160 mg  160 mg Oral DAILY    fluticasone (FLONASE) 50 mcg/actuation nasal spray 2 Spray  2 Spray Both Nostrils DAILY    gabapentin (NEURONTIN) capsule 200 mg  200 mg Oral BID    guaiFENesin SR (MUCINEX) tablet 1,200 mg  1,200 mg Oral Q12H    pantoprazole (PROTONIX) tablet 40 mg  40 mg Oral ACB    rosuvastatin (CRESTOR) tablet 20 mg  20 mg Oral QHS    zolpidem (AMBIEN) tablet 10 mg  10 mg Oral QHS PRN    sodium chloride (NS) flush 5-10 mL  5-10 mL IntraVENous PRN     Data Review:   Recent Results (from the past 24 hour(s))   EKG, 12 LEAD, INITIAL    Collection Time: 01/17/17  8:03 PM   Result Value Ref Range    Systolic BP  mmHg    Diastolic BP  mmHg    Ventricular Rate 76 BPM    Atrial Rate 76 BPM    P-R Interval 178 ms    QRS Duration 92 ms    Q-T Interval 422 ms    QTC Calculation (Bezet) 474 ms    Calculated P Axis 60 degrees    Calculated R Axis 60 degrees    Calculated T Axis 63 degrees    Diagnosis Normal sinus rhythm  Normal ECG      MAGNESIUM    Collection Time: 01/18/17  5:20 AM   Result Value Ref Range    Magnesium 2.6 (H) 1.8 - 2.4 mg/dL   METABOLIC PANEL, BASIC    Collection Time: 01/18/17  5:20 AM   Result Value Ref Range    Sodium 143 136 - 145 mmol/L    Potassium 4.1 3.5 - 5.1 mmol/L    Chloride 106 98 - 107 mmol/L    CO2 27 21 - 32 mmol/L    Anion gap 10 7 - 16 mmol/L    Glucose 84 65 - 100 mg/dL    BUN 15 8 - 23 MG/DL    Creatinine 1.15 0.8 - 1.5 MG/DL    GFR est AA >60 >60 ml/min/1.73m2    GFR est non-AA >60 >60 ml/min/1.73m2    Calcium 8.9 8.3 - 10.4 MG/DL       EKG:  (EKG has been independently visualized by me with interpretation below) NSR, QTc 474 msec  Assessment:     Active Problems:    Atrial fibrillation with RVR (HCC) (1/11/2017)      ISABELLE (obstructive sleep apnea) (1/11/2017)      Plan:   1.  Afib: Pt continuing sotalol loading, cont telemetry and post dose ECGs, on eliquis  2. Sotalol: QTc acceptable, cont current dose    Yas Payer SREE Díaz MD  Cardiology/Electrophysiology

## 2017-01-19 ENCOUNTER — HOME HEALTH ADMISSION (OUTPATIENT)
Dept: HOME HEALTH SERVICES | Facility: HOME HEALTH | Age: 62
End: 2017-01-19
Payer: COMMERCIAL

## 2017-01-19 VITALS
BODY MASS INDEX: 32.48 KG/M2 | RESPIRATION RATE: 18 BRPM | HEIGHT: 72 IN | HEART RATE: 70 BPM | WEIGHT: 239.8 LBS | OXYGEN SATURATION: 100 % | DIASTOLIC BLOOD PRESSURE: 91 MMHG | SYSTOLIC BLOOD PRESSURE: 149 MMHG | TEMPERATURE: 98.2 F

## 2017-01-19 LAB
ANION GAP BLD CALC-SCNC: 9 MMOL/L (ref 7–16)
ATRIAL RATE: 67 BPM
ATRIAL RATE: 70 BPM
BUN SERPL-MCNC: 15 MG/DL (ref 8–23)
CALCIUM SERPL-MCNC: 8.6 MG/DL (ref 8.3–10.4)
CALCULATED P AXIS, ECG09: 65 DEGREES
CALCULATED P AXIS, ECG09: 78 DEGREES
CALCULATED R AXIS, ECG10: 54 DEGREES
CALCULATED R AXIS, ECG10: 63 DEGREES
CALCULATED T AXIS, ECG11: 54 DEGREES
CALCULATED T AXIS, ECG11: 60 DEGREES
CHLORIDE SERPL-SCNC: 106 MMOL/L (ref 98–107)
CO2 SERPL-SCNC: 28 MMOL/L (ref 21–32)
CREAT SERPL-MCNC: 1.22 MG/DL (ref 0.8–1.5)
DIAGNOSIS, 93000: NORMAL
DIAGNOSIS, 93000: NORMAL
DIASTOLIC BP, ECG02: NORMAL MMHG
DIASTOLIC BP, ECG02: NORMAL MMHG
GLUCOSE SERPL-MCNC: 95 MG/DL (ref 65–100)
MAGNESIUM SERPL-MCNC: 2.4 MG/DL (ref 1.8–2.4)
P-R INTERVAL, ECG05: 172 MS
P-R INTERVAL, ECG05: 178 MS
POTASSIUM SERPL-SCNC: 3.7 MMOL/L (ref 3.5–5.1)
Q-T INTERVAL, ECG07: 424 MS
Q-T INTERVAL, ECG07: 474 MS
QRS DURATION, ECG06: 94 MS
QRS DURATION, ECG06: 94 MS
QTC CALCULATION (BEZET), ECG08: 448 MS
QTC CALCULATION (BEZET), ECG08: 511 MS
SODIUM SERPL-SCNC: 143 MMOL/L (ref 136–145)
SYSTOLIC BP, ECG01: NORMAL MMHG
SYSTOLIC BP, ECG01: NORMAL MMHG
VENTRICULAR RATE, ECG03: 67 BPM
VENTRICULAR RATE, ECG03: 70 BPM

## 2017-01-19 PROCEDURE — 93005 ELECTROCARDIOGRAM TRACING: CPT | Performed by: INTERNAL MEDICINE

## 2017-01-19 PROCEDURE — 83735 ASSAY OF MAGNESIUM: CPT | Performed by: INTERNAL MEDICINE

## 2017-01-19 PROCEDURE — 74011000250 HC RX REV CODE- 250: Performed by: NURSE PRACTITIONER

## 2017-01-19 PROCEDURE — 74011000250 HC RX REV CODE- 250: Performed by: INTERNAL MEDICINE

## 2017-01-19 PROCEDURE — 74011250637 HC RX REV CODE- 250/637: Performed by: INTERNAL MEDICINE

## 2017-01-19 PROCEDURE — 80048 BASIC METABOLIC PNL TOTAL CA: CPT | Performed by: INTERNAL MEDICINE

## 2017-01-19 PROCEDURE — 94640 AIRWAY INHALATION TREATMENT: CPT

## 2017-01-19 PROCEDURE — 36415 COLL VENOUS BLD VENIPUNCTURE: CPT | Performed by: INTERNAL MEDICINE

## 2017-01-19 RX ORDER — FOLIC ACID 1 MG/1
1 TABLET ORAL DAILY
Qty: 30 TAB | Refills: 11 | Status: SHIPPED | OUTPATIENT
Start: 2017-01-19

## 2017-01-19 RX ORDER — IPRATROPIUM BROMIDE AND ALBUTEROL SULFATE 2.5; .5 MG/3ML; MG/3ML
3 SOLUTION RESPIRATORY (INHALATION)
Qty: 90 NEBULE | Refills: 2 | Status: SHIPPED | OUTPATIENT
Start: 2017-01-19 | End: 2017-02-18

## 2017-01-19 RX ORDER — SOTALOL HYDROCHLORIDE 160 MG/1
160 TABLET ORAL EVERY 12 HOURS
Qty: 60 TAB | Refills: 0 | Status: SHIPPED | OUTPATIENT
Start: 2017-01-19 | End: 2017-02-18

## 2017-01-19 RX ADMIN — FLUTICASONE PROPIONATE 2 SPRAY: 50 SPRAY, METERED NASAL at 09:00

## 2017-01-19 RX ADMIN — APIXABAN 5 MG: 5 TABLET, FILM COATED ORAL at 09:21

## 2017-01-19 RX ADMIN — LEVALBUTEROL HYDROCHLORIDE 0.63 MG: 0.63 SOLUTION RESPIRATORY (INHALATION) at 07:25

## 2017-01-19 RX ADMIN — IPRATROPIUM BROMIDE 0.5 MG: 0.5 SOLUTION RESPIRATORY (INHALATION) at 07:25

## 2017-01-19 RX ADMIN — DULOXETINE HYDROCHLORIDE 120 MG: 60 CAPSULE, DELAYED RELEASE ORAL at 09:21

## 2017-01-19 RX ADMIN — CLONAZEPAM 2 MG: 1 TABLET ORAL at 09:21

## 2017-01-19 RX ADMIN — PANTOPRAZOLE SODIUM 40 MG: 40 TABLET, DELAYED RELEASE ORAL at 05:53

## 2017-01-19 RX ADMIN — Medication 400 MG: at 09:21

## 2017-01-19 RX ADMIN — Medication 100 MG: at 09:22

## 2017-01-19 RX ADMIN — GABAPENTIN 200 MG: 100 CAPSULE ORAL at 09:21

## 2017-01-19 RX ADMIN — SOTALOL HYDROCHLORIDE 160 MG: 80 TABLET ORAL at 05:53

## 2017-01-19 RX ADMIN — GUAIFENESIN 1200 MG: 600 TABLET, EXTENDED RELEASE ORAL at 09:21

## 2017-01-19 RX ADMIN — FENOFIBRATE 160 MG: 160 TABLET ORAL at 09:21

## 2017-01-19 RX ADMIN — BUDESONIDE 500 MCG: 0.5 INHALANT RESPIRATORY (INHALATION) at 07:25

## 2017-01-19 RX ADMIN — FOLIC ACID 1 MG: 1 TABLET ORAL at 09:21

## 2017-01-19 NOTE — PROGRESS NOTES
Care Management Interventions  PCP Verified by CM: Yes  Transition of Care Consult (CM Consult): Home Health, Discharge Loi Humphreys [address] [address]: Yes  Discharge Durable Medical Equipment: Yes ([employer] nebulizer )  Current Support Network: Lives with Spouse  Confirm Follow Up Transport: Family  Plan discussed with Pt/Family/Caregiver: Yes  Freedom of Choice Offered: Yes  Discharge Location  Discharge Placement: Home with home health    Patient discharging home with [facility]. DME from [employer] also provided to patient.

## 2017-01-19 NOTE — PROGRESS NOTES
[address]  Face to Face Encounter    Patients Name: Cally Byrne    YOB: 1955    Primary Diagnosis: Afib RVR  Ordering Physician: Zainab Ren    Date of Face to Face:   1/19/2017                                  Face to Face Encounter findings are related to primary reason for home care:   yes. 1. I certify that the patient needs intermittent care as follows: skilled nursing care:  skilled observation/assessment, patient education    2. I certify that this patient is homebound, that is: 1) patient requires the use of a walker device, special transportation, or assistance of another to leave the home; or 2) patient's condition makes leaving the home medically contraindicated; and 3) patient has a normal inability to leave the home and leaving the home requires considerable and taxing effort. Patient may leave the home for infrequent and short duration for medical reasons, and occasional absences for non-medical reasons. Homebound status is due to the following functional limitations: Patient with strength deficits limiting the performance of all ADL's without caregiver assistance or the use of an assistive device. 3. I certify that this patient is under my care and that I, or a nurse practitioner or  [id number], or clinical nurse specialist, or certified nurse midwife, working with me, had a Face-to-Face Encounter that meets the physician Face-to-Face Encounter requirements. The following are the clinical findings from the [address] [place] [address] encounter that support the need for skilled services and is a summary of the encounter:     See discharge summary and hospital chart       [address]  1/19/2017      THE FOLLOWING TO BE COMPLETED BY THE COMMUNITY PHYSICIAN:    I concur with the findings described above from the [facility] encounter that this patient is homebound and in need of a skilled service.     Certifying Physician: _____________________________________      Printed Certifying Physician Name: _____________________________________    Date: _________________

## 2017-01-19 NOTE — DISCHARGE SUMMARY
Hospitalist Discharge Summary     Patient ID:  Keegan Ruggiero  [id number]  23 y.o.  1955  Admit date: 1/11/2017  1:24 PM  Discharge date and time: 1/19/2017  Attending: Osei Puga DO  PCP:  Rishi Lozano MD  Treatment Team: Attending Provider: Osei Puga DO; Consulting Provider: Rishi Lozano MD; Utilization Review: Shara Harrell RN; Consulting Provider: Kasia Awan MD; Care Manager: [address] [place] [address]    Principal Diagnosis Community acquired bacterial pneumonia   Active Problems:    Atrial fibrillation with RVR ([address] (1/11/2017)      ISABELLE (obstructive sleep apnea) (1/11/2017)      HPI: 57year old CM with a PMH of COPD and PAF presented to the ER with acute on chronic shortness of breath, multiple syncopal episodes over the past 4 days, and diaphresis with chills. He woke up at 0430 and felt short of breath so he sat on the side of the bed \"with my hands on my knees\". The SOB persisted through the morning so he went to the local fire dept and they took his vital signs and noticed that his HR was in the 140s so he came to the ER. Currently he complaints of anxiety, shortness of breath, non-productive cough, and diaphoresis. Hospital Course: Admitted 1/11 with SOB; likely multifactorial from COPD exacerbation and uncontrolled AFib with RVR. ECHO EF 55-60%. Was seen per pulm for COPD, antibiotics stopped and weaned to room air. Has been stable on RA for > 48hrs. BC NGTD. Using CPAP for ISABELLE and will need pulm followup at discharge. Also discharging with nebulizer as has been much more successful for controlling his symptoms while inpatient. Regarding AFib, he underwent ANDI cardioversion on 1/16; but unfortunately, reverted back to afib. Loaded on sotalol per EP and has converted to NSR. Will follow up with Dr. Lynn Martinez.       Significant Diagnostic Studies:   Labs: Results:       Chemistry Recent Labs      01/19/17   0517  01/18/17   0520  01/17/17   0505   GLU  95  84  89 NA  143  143  143   K  3.7  4.1  4.0   CL  106  106  106   CO2  28  27  28   BUN  15  15  13   CREA  1.22  1.15  1.21   CA  8.6  8.9  8.8   AGAP  9  10  9      CBC w/Diff No results for input(s): WBC, RBC, HGB, HCT, PLT, GRANS, LYMPH, EOS, HGBEXT, HCTEXT, PLTEXT, HGBEXT, HCTEXT, PLTEXT in the last 72 hours. Cardiac Enzymes No results for input(s): CPK, CKND1, VESTA in the last 72 hours. No lab exists for component: CKRMB, TROIP   Coagulation No results for input(s): PTP, INR, APTT in the last 72 hours. No lab exists for component: INREXT, INREXT    Lipid Panel No results found for: CHOL, CHOLPOCT, CHOLX, CHLST, CHOLV, [id number], HDL, LDL, NLDLCT, DLDL, LDLC, DLDLP, 701940, VLDLC, VLDL, TGL, TGLX, TRIGL, [id number], TRIGP, TGLPOCT, [id number], CHHD, CHHDX   BNP No results for input(s): BNPP in the last 72 hours. Liver Enzymes No results for input(s): TP, ALB, TBIL, AP, SGOT, GPT in the last 72 hours. No lab exists for component: DBIL   Thyroid Studies Lab Results   Component Value Date/Time    TSH 3.130 01/11/2017 01:26 PM            Discharge Exam:  Visit Vitals    BP (!) 149/91 (BP 1 Location: Left arm, BP Patient Position: At rest)    Pulse 70    Temp 98.2 °F (36.8 °C)    Resp 18    Ht 6' (1.829 m)    Wt 108.8 kg (239 lb 12.8 oz)    SpO2 100%    BMI 32.52 kg/m2     General appearance: alert, cooperative, NAD  Lungs: clear to auscultation bilaterally, no wheezing, normal resp effort  Heart: RRR  Abdomen: soft, NTTP, +NABS  Extremities: no cyanosis or edema  Neurologic: Grossly normal, A&Ox3    Disposition: home  Discharge Condition: stable  Patient Instructions:   Current Discharge Medication List      START taking these medications    Details   folic acid (FOLVITE) 1 mg tablet Take 1 Tab by mouth daily. Qty: 30 Tab, Refills: 11      albuterol-ipratropium (DUO-NEB) 2.5 mg-0.5 mg/3 ml nebu 3 mL by Nebulization route every four (4) hours as needed for up to 30 days.   Qty: 90 Nebule, Refills: 2 CONTINUE these medications which have CHANGED    Details   sotalol (BETAPACE) 160 mg tablet Take 1 Tab by mouth every twelve (12) hours for 30 days. Qty: 60 Tab, Refills: 0         CONTINUE these medications which have NOT CHANGED    Details   apixaban (ELIQUIS) 5 mg tablet Take 5 mg by mouth two (2) times a day. dicyclomine (BENTYL) 10 mg capsule Take 10 mg by mouth two (2) times a day. meloxicam (MOBIC) 7.5 mg tablet Take 7.5 mg by mouth two (2) times daily as needed for Pain. clonazePAM (KLONOPIN) 2 mg tablet Take 2 mg by mouth three (3) times daily. albuterol (PROAIR HFA) 90 mcg/actuation inhaler Take 2 Puffs by inhalation every four (4) hours as needed for Wheezing or Shortness of Breath (cough). Mouth care after use  Qty: 1 Inhaler, Refills: 11    Associated Diagnoses: COPD (chronic obstructive pulmonary disease) ([facility])      tiotropium (SPIRIVA WITH HANDIHALER) 18 mcg inhalation capsule Take 1 Cap by inhalation daily. Mouth care after use  Qty: 30 Cap, Refills: 11    Associated Diagnoses: COPD (chronic obstructive pulmonary disease) ([facility])      guaiFENesin (MUCINEX) 1,200 mg Ta12 ER tablet Take 1,200 mg by mouth as needed. magnesium oxide (MAG-OX) 400 mg tablet Take 400 mg by mouth every morning. Last dose 6/29/16  Indications: HYPOMAGNESEMIA      rosuvastatin (CRESTOR) 20 mg tablet Take 20 mg by mouth nightly. omeprazole (PRILOSEC) 40 mg capsule Take 40 mg by mouth nightly.      gabapentin (NEURONTIN) 100 mg capsule Take 200 mg by mouth two (2) times a day. fenofibrate nanocrystallized (TRICOR) 145 mg tablet Take 145 mg by mouth every morning. fluticasone (FLONASE) 50 mcg/actuation nasal spray 1 - 2 sprays daily each nostril, blow nose prior to use and do not blow nose for 20 min. After use  Qty: 1 Bottle, Refills: 11    Associated Diagnoses: COPD (chronic obstructive pulmonary disease) ([facility])      zolpidem (AMBIEN) 10 mg tablet Take 20 mg by mouth.  2 tabs every night DULoxetine (CYMBALTA) 60 mg capsule Take 120 mg by mouth every morning.  Indications: ANXIETY WITH DEPRESSION      cpap machine kit 9 cm qhs         STOP taking these medications       metoprolol tartrate (LOPRESSOR) 25 mg tablet Comments:   Reason for Stopping:         predniSONE (DELTASONE) 20 mg tablet Comments:   Reason for Stopping:         doxycycline (MONODOX) 100 mg capsule Comments:   Reason for Stopping:         diltiazem (CARDIZEM) 60 mg tablet Comments:   Reason for Stopping:               Activity: PT/OT per Home Health  Diet: Cardiac Diet    Follow-up  -   PCP in 1-2 weeks  -   Cardiology Gabbie Will) in 2 weeks  -   Pulm [facility]) in 3-4 weeks    Time spent to discharge patient: 35min    Signed:  Vahe Camargo MD  1/19/2017  10:11 AM

## 2017-01-19 NOTE — ADT AUTH CERT NOTES
Patient Demographics        Patient Name [address] Sex  Address Phone       Chirag Guerra [phone] Male 1955 [address] [place] [address] Avenue  [facility] [id number] (Home) *Preferred*           CSN:       [id number]           Admit Date: Admit Time Room Bed       2017  1:24  [05698] 01 [789]           Attending Providers        Provider Pager From To       Yosef Hoffman MD  17       Cathryn Schmidt DO  17           Emergency Contact(s)        Name Relation Home Work Mobile       Erica Pat Spouse [phone]           Utilization Review           Atrial Fibrillation - Care Day 8 (2017) by Ximena Lucio RN        Review Entered Review Status       2017 Completed       Details              Care Day: 8 Care Date: 2017 Level of Care: Telemetry       Guideline Day 2        Clinical Status       (X) * Hemodynamic stability       (X) * Sinus rhythm or acceptable ventricular rate       (X) * No evidence of myocardial ischemia       (X) * Anticoagulation regimen for next level of care established or not necessary       ( ) * Discharge plans and education understood              Activity       (X) * Ambulatory              Routes       (X) * Oral hydration, diet, and medications              Interventions       (X) Cardiac monitoring              Medications       (X) Possible medication for rhythm or rate control                                   * Milestone              Additional Notes       CONT STAY REVIEW FOR  17              [facility] CARDIOLOGY PROGRESS NOTE       Admit Diagnosis: Sepsis ([facility] Community acquired bacterial pneumonia; Atrial FIB               Subjective:       No complaints this AM, no chest pain or shortness of breath               Interval History: (History of pertinent interval events obtained from nursing staff)       Converted to NSR on sotalol        Active Problems:       Atrial fibrillation with RVR ([address] (2017)       ISABELLE (obstructive sleep apnea) (1/11/2017)                       Plan:       1. Afib: Pt continuing sotalol loading, cont telemetry and post dose ECGs, on eliquis       2.  Sotalol: QTc acceptable, cont current dose       EKG-Normal sinus rhythm        Prolonged QT        Abnormal ECG       Magnesium: 2.6 (H)              NSR, VSS, CARDIAC DIET, NS 75/HR, ELIQUIS, PULMACORT NEBS,        CARDIZEM IV DRIP-OFF, ATROVENT NEBS, XOPENEX NEBS, CRESTOR, BETAPACE

## 2017-01-19 NOTE — DISCHARGE INSTRUCTIONS
Atrial Fibrillation: Care Instructions  Your Care Instructions    Atrial fibrillation is an irregular and often fast heartbeat. Treating this condition is important for several reasons. It can cause blood clots, which can travel from your heart to your brain and cause a stroke. If you have a fast heartbeat, you may feel lightheaded, dizzy, and weak. An irregular heartbeat can also increase your risk for heart failure. Atrial fibrillation is often the result of another heart condition, such as high blood pressure or coronary artery disease. Making changes to improve your heart condition will help you stay healthy and active. Follow-up care is a key part of your treatment and safety. Be sure to make and go to all appointments, and call your doctor if you are having problems. It's also a good idea to know your test results and keep a list of the medicines you take. How can you care for yourself at home? Medicines  · Take your medicines exactly as prescribed. Call your doctor if you think you are having a problem with your medicine. You will get more details on the specific medicines your doctor prescribes. · If your doctor has given you a blood thinner to prevent a stroke, be sure you get instructions about how to take your medicine safely. Blood thinners can cause serious bleeding problems. · Do not take any vitamins, over-the-counter drugs, or herbal products without talking to your doctor first.  Lifestyle changes  · Do not smoke. Smoking can increase your chance of a stroke and heart attack. If you need help quitting, talk to your doctor about stop-smoking programs and medicines. These can increase your chances of quitting for good. · Eat a heart-healthy diet. · Stay at a healthy weight. Lose weight if you need to. · Limit alcohol to 2 drinks a day for men and 1 drink a day for women. Too much alcohol can cause health problems. · Avoid colds and flu. Get a pneumococcal vaccine shot.  If you have had one before, ask your doctor whether you need another dose. Get a flu shot every year. If you must be around people with colds or flu, wash your hands often. Activity  · If your doctor recommends it, get more exercise. Walking is a good choice. Bit by bit, increase the amount you walk every day. Try for at least 30 minutes on most days of the week. You also may want to swim, bike, or do other activities. Your doctor may suggest that you join a cardiac rehabilitation program so that you can have help increasing your physical activity safely. · Start light exercise if your doctor says it is okay. Even a small amount will help you get stronger, have more energy, and manage stress. Walking is an easy way to get exercise. Start out by walking a little more than you did in the hospital. Gradually increase the amount you walk. · When you exercise, watch for signs that your heart is working too hard. You are pushing too hard if you cannot talk while you are exercising. If you become short of breath or dizzy or have chest pain, sit down and rest immediately. · Check your pulse regularly. Place two fingers on the artery at the palm side of your wrist, in line with your thumb. If your heartbeat seems uneven or fast, talk to your doctor. When should you call for help? Call 911 anytime you think you may need emergency care. For example, call if:  · You have symptoms of a heart attack. These may include:  ¨ Chest pain or pressure, or a strange feeling in the chest.  ¨ Sweating. ¨ Shortness of breath. ¨ Nausea or vomiting. ¨ Pain, pressure, or a strange feeling in the back, neck, jaw, or upper belly or in one or both shoulders or arms. ¨ Lightheadedness or sudden weakness. ¨ A fast or irregular heartbeat. After you call 911, the  may tell you to chew 1 adult-strength or 2 to 4 low-dose aspirin. Wait for an ambulance. Do not try to drive yourself. · You have symptoms of a stroke.  These may include:  ¨ Sudden numbness, tingling, weakness, or loss of movement in your face, arm, or leg, especially on only one side of your body. ¨ Sudden vision changes. ¨ Sudden trouble speaking. ¨ Sudden confusion or trouble understanding simple statements. ¨ Sudden problems with walking or balance. ¨ A sudden, severe headache that is different from past headaches. · You passed out (lost consciousness). Call your doctor now or seek immediate medical care if:  · You have new or increased shortness of breath. · You feel dizzy or lightheaded, or you feel like you may faint. · Your heart rate becomes irregular. · You can feel your heart flutter in your chest or skip heartbeats. Tell your doctor if these symptoms are new or worse. Watch closely for changes in your health, and be sure to contact your doctor if you have any problems. Where can you learn more? Go to http://annalee-sapphire.info/. Enter U020 in the search box to learn more about \"Atrial Fibrillation: Care Instructions. \"  Current as of: January 27, 2016  Content Version: 11.1  © 6968-8113 [employer]. Care instructions adapted under license by [employer] (which disclaims liability or warranty for this information). If you have questions about a medical condition or this instruction, always ask your healthcare professional. [address] any warranty or liability for your use of this information. Electrical Cardioversion: What to Expect at Home  Your Recovery    Electrical cardioversion is a treatment for an abnormal heartbeat, such as atrial fibrillation, supraventricular tachycardia, or ventricular tachycardia (VT). It uses a brief electrical shock to reset your heart's rhythm. After cardioversion, you may have redness, like a sunburn, where the patches were. The medicines you got to make you sleepy may make you feel drowsy for the rest of the day.   Your doctor may have you take medicines to help the heart beat normally and to prevent blood clots. This care sheet gives you a general idea about how long it will take for you to recover. But each person recovers at a different pace. Follow the steps below to feel better as quickly as possible. How can you care for yourself at home? Medicines  · Be safe with medicines. Take your medicines exactly as prescribed. Call your doctor if you think you are having a problem with your medicine. You may take one or more of the following medicines:  ¨ Rate-control medicines to slow the heart rate. These include beta-blockers, calcium channel blockers, and digoxin. ¨ Rhythm control medicines that help the heart keep a normal rhythm. ¨ Blood thinners, also called anticoagulants, which help prevent blood clots. You will get more details on the specific medicines your doctor prescribes. Be sure you know how to take your medicines safely. · Do not take any vitamins, over-the-counter medicines, or herbal products without talking to your doctor first.  Exercise  · Start light exercise if your doctor says that it is okay. Even a small amount will help you get stronger, have more energy, and manage your stress. Walking is an easy way to get exercise. Start out by walking a little more than you did in the hospital. Bit by bit, increase the amount you walk. · When you exercise, watch for signs that your heart is working too hard. You are pushing too hard if you cannot talk while you are exercising. If you become short of breath or dizzy or have chest pain, sit down and rest right away. · Check your pulse regularly. Place two fingers on the artery at the palm side of your wrist in line with your thumb. If your heartbeat seems uneven or fast, talk to your doctor. Other instructions  · Ask your doctor when you can drive again. · Do not smoke. If you need help quitting, talk to your doctor about stop-smoking programs and medicines.  These can increase your chances of quitting for good. · Limit alcohol. Follow-up care is a key part of your treatment and safety. Be sure to make and go to all appointments, and call your doctor if you are having problems. It's also a good idea to know your test results and keep a list of the medicines you take. When should you call for help? Call 911 anytime you think you may need emergency care. For example, call if:  · You have chest pain or pressure. This may occur with:  ¨ Sweating. ¨ Shortness of breath. ¨ Nausea or vomiting. ¨ Pain that spreads from the chest to the neck, jaw, or one or both shoulders or arms. ¨ A fast or uneven pulse. After calling 911, the  may tell you to chew 1 adult-strength or 2 to 4 low-dose aspirin. Wait for an ambulance. Do not try to drive yourself. · You have symptoms of a stroke. These may include:  ¨ Sudden numbness, tingling, weakness, or loss of movement in your face, arm, or leg, especially on only one side of your body. ¨ Sudden vision changes. ¨ Sudden trouble speaking. ¨ Sudden confusion or trouble understanding simple statements. ¨ Sudden problems with walking or balance. ¨ A sudden, severe headache that is different from past headaches. · You vomit blood or what looks like coffee grounds. · You pass maroon or very bloody stools. · You passed out (lost consciousness). Call your doctor now or seek immediate medical care if:  · You feel dizzy or lightheaded, or you feel like you may faint. · Your heart rate becomes irregular. · You have shortness of breath. · You have any unusual bleeding, such as:  ¨ Bruises or blood spots under the skin. ¨ A nosebleed that you cannot stop. ¨ Bleeding gums when you brush your teeth. ¨ Blood in your urine. ¨ Vaginal bleeding when you are not having your period, or heavy period bleeding. ¨ Your stools are black and tarlike or have streaks of blood.   Watch closely for any changes in your health, and be sure to contact your doctor if:  · You do not get better as expected. Where can you learn more? Go to http://annalee-sapphire.info/. Enter A617 in the search box to learn more about \"Electrical Cardioversion: What to Expect at Home. \"  Current as of: January 27, 2016  Content Version: 11.1  © 8042-1759 [employer]. Care instructions adapted under license by [employer] (which disclaims liability or warranty for this information). If you have questions about a medical condition or this instruction, always ask your healthcare professional. [address] any warranty or liability for your use of this information. Sotalol (By mouth)   Sotalol (CRYSTAL-ta-lol)  Treats heart rhythm problems. This medicine is a beta blocker. Brand Name(s):Betapace, Betapace AF, Sorine, Sotylize   There may be other brand names for this medicine. When This Medicine Should Not Be Used: This medicine is not right for everyone. Do not use it if you had an allergic reaction to sotalol, or you have certain heart or lung problems. Talk with your doctor about what these problems are. How to Use This Medicine:   Liquid, Tablet  · Take your medicine as directed. Your dose may need to be changed several times to find what works best for you. · Measure the oral liquid medicine with a marked measuring spoon, oral syringe, or medicine cup. · Contact your doctor or pharmacist before your supply of this medicine starts to run low. Do not allow yourself to run out of medicine. · Read and follow the patient instructions that come with this medicine. Talk to your doctor or pharmacist if you have any questions. · Missed dose: Take a dose as soon as you remember. If it is almost time for your next dose, wait until then and take a regular dose. Do not take extra medicine to make up for a missed dose. · Store the medicine in a closed container at room temperature, away from heat, moisture, and direct light.   Drugs and Foods to Avoid:   Ask your doctor or pharmacist before using any other medicine, including over-the-counter medicines, vitamins, and herbal products. · Some foods and medicines can affect how sotalol works. Tell your doctor if you are using the following:  ¨ Albuterol, clonidine, digoxin, guanethidine, isoproterenol, reserpine, terbutaline  ¨ Blood pressure medicines  ¨ Insulin or diabetes medicine  ¨ Medicine to treat depression  ¨ Medicine to treat an infection  ¨ Other medicine to treat heart rhythm problems, such as amiodarone, disopyramide, procainamide, or quinidine  ¨ Phenothiazine medicine (such as chlorpromazine, perphenazine, prochlorperazine, promethazine, thioridazine)  · If you are taking an antacid that contains aluminum or magnesium hydroxide, take it 2 hours before or 2 hours after you take sotalol. Warnings While Using This Medicine:   · Tell your doctor if you are pregnant or breastfeeding, or if you have kidney disease, diabetes, heart disease, angina, low blood pressure, lung or breathing problems, overactive thyroid, or a history of severe allergic reactions or heart attack. · This medicine may cause increased heart rhythm problems while your dose is being adjusted. · Do not stop using this medicine suddenly. Your doctor will need to slowly decrease your dose before you stop it completely. You could have worsening chest pain or heart rhythm problems if you stop using this medicine suddenly. · This medicine may raise or lower your blood sugar level. · This medicine may make you dizzy. Do not drive or do anything else that could be dangerous until you know how this medicine affects you. Stand up slowly if you feel dizzy or lightheaded. · Tell any doctor or dentist who treats you that you are using this medicine. · Your doctor will do lab tests at regular visits to check on the effects of this medicine. Keep all appointments. ECG tests will be needed to check for unwanted effects.   · Keep all medicine out of the reach of children. Never share your medicine with anyone. Possible Side Effects While Using This Medicine:   Call your doctor right away if you notice any of these side effects:  · Allergic reaction: Itching or hives, swelling in your face or hands, swelling or tingling in your mouth or throat, chest tightness, trouble breathing  · Chest pain  · Dry mouth, increased thirst, muscle cramps, nausea or vomiting  · Fainting, dizziness, lightheadedness  · Fast, slow, or irregular heartbeat  · Rapid weight gain, swelling in your hands, feet, or ankles, trouble breathing  If you notice these less serious side effects, talk with your doctor:   · Diarrhea  · Increased sweating  · Tiredness or weakness  If you notice other side effects that you think are caused by this medicine, tell your doctor. Call your doctor for medical advice about side effects. You may report side effects to FDA at 0-584-FDA-6591  © 2016 [address] [employer] is for End User's use only and may not be sold, redistributed or otherwise used for commercial purposes. The above information is an  only. It is not intended as medical advice for individual conditions or treatments. Talk to your doctor, nurse or pharmacist before following any medical regimen to see if it is safe and effective for you.

## 2017-01-19 NOTE — PROGRESS NOTES
[facility] CARDIOLOGY PROGRESS NOTE           1/19/2017 7:27 AM    Admit Date: 1/11/2017    Admit Diagnosis: Sepsis ([address] Community acquired bacterial pneumonia; Atrial *      Subjective:   Patient remains in NSR with stable Qtc      Objective:     Vitals:    01/18/17 1942 01/18/17 2200 01/19/17 0130 01/19/17 0549   BP: 151/87 124/72 126/70 124/76   Pulse: 73 67 67 65   Resp: 18 18 18 18   Temp: 98.1 °F (36.7 °C) 97.5 °F (36.4 °C) 97.7 °F (36.5 °C) 97.5 °F (36.4 °C)   SpO2: 92% 94% 94% 92%   Weight:    239 lb 12.8 oz (108.8 kg)   Height:           Physical Exam:  General-Well Developed, Well Nourished, No Acute Distress, Alert & Oriented x 3, appropriate mood. Neck- supple, no JVD  CV- regular rate and rhythm no MRG  Lung- clear bilaterally  Abd- soft, nontender, nondistended  Ext- no edema bilaterally.   Skin- warm and dry    Current Facility-Administered Medications   Medication Dose Route Frequency    sodium chloride (NS) flush 5-10 mL  5-10 mL IntraVENous Q8H    sotalol (BETAPACE) tablet 160 mg  160 mg Oral Q12H    apixaban (ELIQUIS) tablet 5 mg  5 mg Oral Q12H    levalbuterol (XOPENEX) nebulizer soln 0.63 mg/3 mL  0.63 mg Nebulization Q6H RT    ipratropium (ATROVENT) 0.02 % nebulizer solution 0.5 mg  0.5 mg Nebulization Q6H RT    budesonide (PULMICORT) 500 mcg/2 ml nebulizer suspension  500 mcg Nebulization BID RT    0.9% sodium chloride infusion  75 mL/hr IntraVENous CONTINUOUS    clonazePAM (KlonoPIN) tablet 2 mg  2 mg Oral BID    dilTIAZem (CARDIZEM) 100 mg in 0.9% sodium chloride (MBP/ADV) 100 mL infusion  0-15 mg/hr IntraVENous CONTINUOUS    magnesium oxide (MAG-OX) tablet 400 mg  400 mg Oral DAILY    thiamine (B-1) tablet 100 mg  100 mg Oral DAILY    folic acid (FOLVITE) tablet 1 mg  1 mg Oral DAILY    DULoxetine (CYMBALTA) capsule 120 mg  120 mg Oral DAILY    fenofibrate (LOFIBRA) tablet 160 mg  160 mg Oral DAILY    fluticasone (FLONASE) 50 mcg/actuation nasal spray 2 Spray 2 [place] Both Nostrils DAILY    gabapentin (NEURONTIN) capsule 200 mg  200 mg Oral BID    guaiFENesin SR (MUCINEX) tablet 1,200 mg  1,200 mg Oral Q12H    pantoprazole (PROTONIX) tablet 40 mg  40 mg Oral ACB    rosuvastatin (CRESTOR) tablet 20 mg  20 mg Oral QHS    zolpidem (AMBIEN) tablet 10 mg  10 mg Oral QHS PRN    sodium chloride (NS) flush 5-10 mL  5-10 mL IntraVENous PRN     Data Review:   Recent Results (from the past 24 hour(s))   EKG, 12 LEAD, INITIAL    Collection Time: 01/18/17  8:21 AM   Result Value Ref Range    Systolic BP  mmHg    Diastolic BP  mmHg    Ventricular Rate 72 BPM    Atrial Rate 72 BPM    P-R Interval 180 ms    QRS Duration 94 ms    Q-T Interval 438 ms    QTC Calculation (Bezet) 479 ms    Calculated P Axis 66 degrees    Calculated R Axis 59 degrees    Calculated T Axis 52 degrees    Diagnosis       Normal sinus rhythm  Normal ECG  Confirmed by ST SHERRY SHIELDS MD (), YORDAN GERARDO ([id number]) on 1/18/2017 1:38:11 PM     EKG, 12 LEAD, INITIAL    Collection Time: 01/18/17  7:43 PM   Result Value Ref Range    Systolic BP  mmHg    Diastolic BP  mmHg    Ventricular Rate 70 BPM    Atrial Rate 70 BPM    P-R Interval 178 ms    QRS Duration 94 ms    Q-T Interval 474 ms    QTC Calculation (Bezet) 511 ms    Calculated P Axis 78 degrees    Calculated R Axis 63 degrees    Calculated T Axis 60 degrees    Diagnosis       Normal sinus rhythm  Prolonged QT  Abnormal ECG  Confirmed by Ondina Reyes MD (), ROXANNE MANN ([id number]) on 1/19/2017 6:59:40 AM     MAGNESIUM    Collection Time: 01/19/17  5:17 AM   Result Value Ref Range    Magnesium 2.4 1.8 - 2.4 mg/dL   METABOLIC PANEL, BASIC    Collection Time: 01/19/17  5:17 AM   Result Value Ref Range    Sodium 143 136 - 145 mmol/L    Potassium 3.7 3.5 - 5.1 mmol/L    Chloride 106 98 - 107 mmol/L    CO2 28 21 - 32 mmol/L    Anion gap 9 7 - 16 mmol/L    Glucose 95 65 - 100 mg/dL    BUN 15 8 - 23 MG/DL    Creatinine 1.22 0.8 - 1.5 MG/DL    GFR est AA >60 >60 ml/min/1.73m2    GFR est non-AA >60 >60 ml/min/1.73m2    Calcium 8.6 8.3 - 10.4 MG/DL     Assessment:     Active Problems:    Atrial fibrillation with RVR (HCC) (1/11/2017)      ISABELLE (obstructive sleep apnea) (1/11/2017)      Plan:   1. Afib: Pt continuing sotalol, on eliquis. 6th dose is this am. Patient is cardiac stable for discharge home. Follow up with Dr. Jeronimo Sidhu  2. Sotalol: QTc acceptable, cont current dose    Bárbara Weathers. Robert Wood M.D., F.A.C.C, F.H.R.S.   Cardiology/Electrophysiology

## 2017-01-19 NOTE — PROGRESS NOTES
Discharge instructions reviewed with patient. Prescriptions given for nebulizer treatment, along with nebulizer, folic acid, and sotalol and med info sheets provided for all new medications. Opportunity for questions provided. Patient voiced understanding of all discharge instructions. IVs removed and monitor off at discharge, RT performing discharge teaching.

## 2017-01-19 NOTE — ADT AUTH CERT NOTES
Utilization Review           Atrial Fibrillation - Care Day 7 (1/17/2017) by Pratik Borden RN        Review Entered Review Status       1/17/2017 Completed       Details              Care Day: 7 Care Date: 1/17/2017 Level of Care: Telemetry       Guideline Day 2        Clinical Status       ( ) * Hemodynamic stability       ( ) * Sinus rhythm or acceptable ventricular rate       (X) * No evidence of myocardial ischemia       (X) * Anticoagulation regimen for next level of care established or not necessary       ( ) * Discharge plans and education understood              Activity       (X) * Ambulatory              Routes       (X) * Oral hydration, diet, and medications              Interventions       (X) Cardiac monitoring              Medications       (X) Possible medication for rhythm or rate control                                   * Milestone              Additional Notes       CONT STAY REVIEW FOR  1/17/17                     Cardiology Progress Notes              Admit Diagnosis: Sepsis ([address] Community acquired bacterial pneumonia; Atrial  Fib                Subjective:       Patient remains in A. Fib. Stable Rates around 100       Assessment:               Active Problems:       Atrial fibrillation with RVR (HCC) (1/11/2017)               ISABELLE (obstructive sleep apnea) (1/11/2017)        Plan:       1. A. Fib - Periods of RVR - Stage of A. Fib unknown. Able to restore but not maintain NSR yesterday. Sotalol has been started, plan to repeat CV on Wed with Dr. Tremaine Ramsay. On Eliquis       2.  Sotalol start - Monitoring HR and Qtc per protocol.               AF , CARDIAC DIET, NS 75/HR, ELIQUIS, CARDIZEM IV DRIP,       ATROVENT NEBS, XOPENEX NEBS, CRESTOR, PULMACORT NEBS,        BETAPACE                  Atrial Fibrillation - Care Day 6 (1/16/2017) by Pratik Borden RN        Review Entered Review Status       1/17/2017 Completed       Details              Care Day: 6 Care Date: 1/16/2017 Level of Care: Telemetry       Guideline Day 2        Clinical Status       ( ) * Hemodynamic stability       ( ) * Sinus rhythm or acceptable ventricular rate       (X) * No evidence of myocardial ischemia       (X) * Anticoagulation regimen for next level of care established or not necessary       ( ) * Discharge plans and education understood              Activity       (X) * Ambulatory              Routes       (X) * Oral hydration, diet, and medications              Interventions       (X) Cardiac monitoring              Medications       (X) Possible medication for rhythm or rate control                                   * Milestone              Additional Notes       CONT STAY REVIEW FOR  1/16/17       Cardiology Progress Notes              Pt examined ,afib rate 85, chest clear, being evaluated by EP for ablation.              Pre-Procedure Diagnosis       1. Atrial fibrilllation         Procedure Performed       1. Transesophageal Echocardiogram       2.  Direct Current Cardioversion        Post Procedure Diagnosis: No ALICIA appendage thrombus, Normal sinus rhythm with quick reversion to afib                     AF , CARDIAC DIET, NS 75/HR, ELIQUIS, CARDIZEM IV DRIP,       ATROVENT NEBS, XOPENEX NEBS, LOPRESSOR PO-D/C, CRESTOR, PULMACORT NEBS,        BETAPACE, VERSED IV, PEPCID IV,                   Atrial Fibrillation - Care Day 5 (1/15/2017) by Pratik Borden RN        Review Entered Review Status       1/17/2017 Completed       Details              Care Day: 5 Care Date: 1/15/2017 Level of Care: Telemetry       Guideline Day 2        Clinical Status       ( ) * Hemodynamic stability       ( ) * Sinus rhythm or acceptable ventricular rate       (X) * No evidence of myocardial ischemia       (X) * Anticoagulation regimen for next level of care established or not necessary       ( ) * Discharge plans and education understood              Activity       (X) * Ambulatory              Routes       (X) * Oral hydration, diet, and medications              Interventions       (X) Cardiac monitoring              Medications       (X) Possible medication for rhythm or rate control                                   * Milestone              Additional Notes       CONT STAY REVIEW FOR  1/15/17       Hospitalist Progress Note         1/15/2017 2:07 PM       SUBJECTIVE:                       Mr. Joseph Alberts is a 56 yo WM with PMH of afib followed per Dr. Meaghan Chni, COPD, ISABELLE admitted with COPD exacerbation and afib with RVR. He is presently on max management currently for tachycardia and eliquis. ECHO EF 55-60%. Has been seen per pulm for COPD, antibiotics stopped and weaned to room air. BC NGTD. Using CPAP for ISABELLE and will need pulm followup.  Plans for home at discharge                1-15 patient spoke to Dr. Meaghan Chin yesterday and has pending EP consult, denies dyspnea/cough/palpitation/edema/anorexia or BM changes       Heart:  tachycardic and irregular rhythm, no murmur, no edema       ASSESSMENT / PLAN                      Active Hospital Problems         Diagnosis Date Noted       o Atrial fibrillation with RVR ([address] 01/11/2017       o ISABELLE (obstructive sleep apnea) 01/11/2017        -EP consult for monday       -stable COPD and ISABELLE with outpatient followup planned        -home when stable        FEN:oral,IVF       DVT Prophylaxis: eliquis       Disposition:pending              Magnesium: 2.6 (H)       AF 92 -125, CARDIAC DIET, NS 75/HR, ELIQUIS, CARDIZEM IV DRIP,       ATROVENT NEBS, XOPENEX NEBS, LOPRESSOR PO, CRESTOR, PULMACORT NEBS,        BETAPACE,

## 2017-01-20 ENCOUNTER — TELEPHONE (OUTPATIENT)
Dept: HOME HEALTH SERVICES | Facility: HOME HEALTH | Age: 62
End: 2017-01-20

## 2017-01-20 NOTE — TELEPHONE ENCOUNTER
[address] [place] [address] Rodger Hoffman Pharmacist consult. Mr. Jimena Fabian was discharged yesterday prior to my consult. I spoke with him today. I reviewed his discharge medications. He verified he had his Duo-neb solution. He said he was breathing a lot better and had not needed to use it as often as every 4 hours. I tried to discuss his Betapace change and other medications with him, but he became confused and was unable to follow with me. He kept talking about diazepam.  I did not see where he was on it. I think he meant diltiazem. I gave him my phone number. I asked him to call me after he reviewed his meds and if he had any questions.  1/20/17

## 2017-01-21 ENCOUNTER — HOME CARE VISIT (OUTPATIENT)
Dept: SCHEDULING | Facility: HOME HEALTH | Age: 62
End: 2017-01-21
Payer: COMMERCIAL

## 2017-01-21 VITALS
TEMPERATURE: 99.1 F | HEART RATE: 78 BPM | BODY MASS INDEX: 32.53 KG/M2 | OXYGEN SATURATION: 94 % | RESPIRATION RATE: 14 BRPM | DIASTOLIC BLOOD PRESSURE: 78 MMHG | WEIGHT: 239.86 LBS | SYSTOLIC BLOOD PRESSURE: 120 MMHG

## 2017-01-21 PROCEDURE — G0299 HHS/HOSPICE OF RN EA 15 MIN: HCPCS

## 2017-01-21 PROCEDURE — 400013 HH SOC

## 2017-01-24 ENCOUNTER — HOME CARE VISIT (OUTPATIENT)
Dept: SCHEDULING | Facility: HOME HEALTH | Age: 62
End: 2017-01-24
Payer: COMMERCIAL

## 2017-01-24 PROCEDURE — G0299 HHS/HOSPICE OF RN EA 15 MIN: HCPCS

## 2017-01-26 ENCOUNTER — TELEPHONE (OUTPATIENT)
Dept: HOME HEALTH SERVICES | Facility: HOME HEALTH | Age: 62
End: 2017-01-26

## 2017-01-27 ENCOUNTER — HOME CARE VISIT (OUTPATIENT)
Dept: SCHEDULING | Facility: HOME HEALTH | Age: 62
End: 2017-01-27
Payer: COMMERCIAL

## 2017-01-27 PROCEDURE — G0299 HHS/HOSPICE OF RN EA 15 MIN: HCPCS

## 2017-01-29 VITALS
HEART RATE: 95 BPM | TEMPERATURE: 96.9 F | SYSTOLIC BLOOD PRESSURE: 118 MMHG | HEART RATE: 73 BPM | OXYGEN SATURATION: 98 % | OXYGEN SATURATION: 97 % | RESPIRATION RATE: 18 BRPM | DIASTOLIC BLOOD PRESSURE: 64 MMHG | TEMPERATURE: 97.7 F | SYSTOLIC BLOOD PRESSURE: 108 MMHG | RESPIRATION RATE: 16 BRPM | DIASTOLIC BLOOD PRESSURE: 70 MMHG

## 2017-01-30 ENCOUNTER — TELEPHONE (OUTPATIENT)
Dept: HOME HEALTH SERVICES | Facility: HOME HEALTH | Age: 62
End: 2017-01-30

## 2017-01-30 NOTE — TELEPHONE ENCOUNTER
Mr. Ferris Days said he went out with some friends last night and did not get home until 2AM.  He forgot to take his sotalol and his HR was 124 this morning. He took it at 10am.  He is still having irregular heartbeats, so said his sotalol was increased to 320mg. Twice a day. No other med changes and no questions. He understands the importance of taking his medication and taking correctly.   He said he did not want to come back into the hospital.

## 2017-01-31 ENCOUNTER — HOME CARE VISIT (OUTPATIENT)
Dept: SCHEDULING | Facility: HOME HEALTH | Age: 62
End: 2017-01-31
Payer: COMMERCIAL

## 2017-01-31 PROCEDURE — G0299 HHS/HOSPICE OF RN EA 15 MIN: HCPCS

## 2017-02-01 VITALS
OXYGEN SATURATION: 94 % | SYSTOLIC BLOOD PRESSURE: 144 MMHG | TEMPERATURE: 97.3 F | RESPIRATION RATE: 18 BRPM | HEART RATE: 74 BPM | DIASTOLIC BLOOD PRESSURE: 80 MMHG

## 2017-03-30 ENCOUNTER — HOSPITAL ENCOUNTER (OUTPATIENT)
Dept: GENERAL RADIOLOGY | Age: 62
Discharge: HOME | End: 2017-03-30
Payer: COMMERCIAL

## 2017-03-30 DIAGNOSIS — J18.9 CAP (COMMUNITY ACQUIRED PNEUMONIA): ICD-10-CM

## 2017-03-30 DIAGNOSIS — J44.9 CHRONIC OBSTRUCTIVE PULMONARY DISEASE, UNSPECIFIED COPD TYPE (HCC): Chronic | ICD-10-CM

## 2017-03-30 PROCEDURE — 71020 XR CHEST PA LAT: CPT

## 2017-09-25 PROBLEM — S50.10XA TRAUMATIC HEMATOMA OF FOREARM: Status: ACTIVE | Noted: 2017-09-25

## 2017-10-23 PROBLEM — R22.32 FOREARM MASS, LEFT: Status: ACTIVE | Noted: 2017-10-23

## 2017-11-02 ENCOUNTER — HOSPITAL ENCOUNTER (OUTPATIENT)
Dept: MRI IMAGING | Age: 62
Discharge: HOME | End: 2017-11-02
Attending: SURGERY
Payer: COMMERCIAL

## 2017-11-02 DIAGNOSIS — R22.32 FOREARM MASS, LEFT: ICD-10-CM

## 2017-11-02 DIAGNOSIS — S50.12XD TRAUMATIC HEMATOMA OF LEFT FOREARM, SUBSEQUENT ENCOUNTER: ICD-10-CM

## 2017-11-02 LAB — CREAT BLD-MCNC: 1.3 MG/DL (ref 0.8–1.5)

## 2017-11-02 PROCEDURE — 73220 MRI UPPR EXTREMITY W/O&W/DYE: CPT

## 2017-11-02 PROCEDURE — 74011250636 HC RX REV CODE- 250/636: Performed by: SURGERY

## 2017-11-02 PROCEDURE — 82565 ASSAY OF CREATININE: CPT

## 2017-11-02 PROCEDURE — A9577 INJ MULTIHANCE: HCPCS | Performed by: SURGERY

## 2017-11-02 RX ORDER — SODIUM CHLORIDE 0.9 % (FLUSH) 0.9 %
10 SYRINGE (ML) INJECTION
Status: COMPLETED | OUTPATIENT
Start: 2017-11-02 | End: 2017-11-02

## 2017-11-02 RX ADMIN — Medication 10 ML: at 16:34

## 2017-11-02 RX ADMIN — GADOBENATE DIMEGLUMINE 10 ML: 529 INJECTION, SOLUTION INTRAVENOUS at 16:34

## 2017-12-13 PROBLEM — Z91.199 NO-SHOW FOR APPOINTMENT: Status: ACTIVE | Noted: 2017-12-13

## 2017-12-15 ENCOUNTER — HOSPITAL ENCOUNTER (OUTPATIENT)
Dept: CARDIAC CATH/INVASIVE PROCEDURES | Age: 62
Discharge: HOME | End: 2017-12-15
Attending: INTERNAL MEDICINE | Admitting: INTERNAL MEDICINE
Payer: COMMERCIAL

## 2017-12-15 VITALS
BODY MASS INDEX: 37.25 KG/M2 | WEIGHT: 275 LBS | TEMPERATURE: 98 F | OXYGEN SATURATION: 98 % | SYSTOLIC BLOOD PRESSURE: 132 MMHG | DIASTOLIC BLOOD PRESSURE: 77 MMHG | HEIGHT: 72 IN | RESPIRATION RATE: 11 BRPM | HEART RATE: 87 BPM

## 2017-12-15 LAB
ANION GAP SERPL CALC-SCNC: 8 MMOL/L (ref 7–16)
ATRIAL RATE: 147 BPM
BUN SERPL-MCNC: 16 MG/DL (ref 8–23)
CALCIUM SERPL-MCNC: 9.1 MG/DL (ref 8.3–10.4)
CALCULATED R AXIS, ECG10: 67 DEGREES
CALCULATED T AXIS, ECG11: 61 DEGREES
CHLORIDE SERPL-SCNC: 102 MMOL/L (ref 98–107)
CO2 SERPL-SCNC: 30 MMOL/L (ref 21–32)
CREAT SERPL-MCNC: 1.33 MG/DL (ref 0.8–1.5)
DIAGNOSIS, 93000: NORMAL
ERYTHROCYTE [DISTWIDTH] IN BLOOD BY AUTOMATED COUNT: 13.1 % (ref 11.9–14.6)
GLUCOSE SERPL-MCNC: 107 MG/DL (ref 65–100)
HCT VFR BLD AUTO: 47.7 % (ref 41.1–50.3)
HGB BLD-MCNC: 16.4 G/DL (ref 13.6–17.2)
INR PPP: 1.3
MAGNESIUM SERPL-MCNC: 2.2 MG/DL (ref 1.8–2.4)
MCH RBC QN AUTO: 34.9 PG (ref 26.1–32.9)
MCHC RBC AUTO-ENTMCNC: 34.4 G/DL (ref 31.4–35)
MCV RBC AUTO: 101.5 FL (ref 79.6–97.8)
PLATELET # BLD AUTO: 231 K/UL (ref 150–450)
PMV BLD AUTO: 10 FL (ref 10.8–14.1)
POTASSIUM SERPL-SCNC: 3.9 MMOL/L (ref 3.5–5.1)
PROTHROMBIN TIME: 15.5 SEC (ref 11.5–14.5)
Q-T INTERVAL, ECG07: 326 MS
QRS DURATION, ECG06: 84 MS
QTC CALCULATION (BEZET), ECG08: 501 MS
RBC # BLD AUTO: 4.7 M/UL (ref 4.23–5.67)
SODIUM SERPL-SCNC: 140 MMOL/L (ref 136–145)
VENTRICULAR RATE, ECG03: 142 BPM
WBC # BLD AUTO: 6.1 K/UL (ref 4.3–11.1)

## 2017-12-15 PROCEDURE — 85027 COMPLETE CBC AUTOMATED: CPT | Performed by: INTERNAL MEDICINE

## 2017-12-15 PROCEDURE — 80048 BASIC METABOLIC PNL TOTAL CA: CPT | Performed by: INTERNAL MEDICINE

## 2017-12-15 PROCEDURE — 99153 MOD SED SAME PHYS/QHP EA: CPT | Performed by: NURSE PRACTITIONER

## 2017-12-15 PROCEDURE — 99152 MOD SED SAME PHYS/QHP 5/>YRS: CPT | Performed by: NURSE PRACTITIONER

## 2017-12-15 PROCEDURE — 93005 ELECTROCARDIOGRAM TRACING: CPT | Performed by: INTERNAL MEDICINE

## 2017-12-15 PROCEDURE — 85610 PROTHROMBIN TIME: CPT | Performed by: INTERNAL MEDICINE

## 2017-12-15 PROCEDURE — 74011250636 HC RX REV CODE- 250/636

## 2017-12-15 PROCEDURE — 83735 ASSAY OF MAGNESIUM: CPT | Performed by: INTERNAL MEDICINE

## 2017-12-15 PROCEDURE — 74011250636 HC RX REV CODE- 250/636: Performed by: INTERNAL MEDICINE

## 2017-12-15 PROCEDURE — 92960 CARDIOVERSION ELECTRIC EXT: CPT | Performed by: NURSE PRACTITIONER

## 2017-12-15 RX ORDER — SODIUM CHLORIDE 9 MG/ML
75 INJECTION, SOLUTION INTRAVENOUS CONTINUOUS
Status: DISCONTINUED | OUTPATIENT
Start: 2017-12-15 | End: 2017-12-15 | Stop reason: HOSPADM

## 2017-12-15 RX ORDER — MIDAZOLAM HYDROCHLORIDE 1 MG/ML
.5-5 INJECTION, SOLUTION INTRAMUSCULAR; INTRAVENOUS
Status: DISCONTINUED | OUTPATIENT
Start: 2017-12-15 | End: 2017-12-15 | Stop reason: HOSPADM

## 2017-12-15 RX ORDER — FENTANYL CITRATE 50 UG/ML
100 INJECTION, SOLUTION INTRAMUSCULAR; INTRAVENOUS AS NEEDED
Status: DISCONTINUED | OUTPATIENT
Start: 2017-12-15 | End: 2017-12-15 | Stop reason: HOSPADM

## 2017-12-15 RX ORDER — LORAZEPAM 2 MG/ML
2 INJECTION INTRAMUSCULAR ONCE
Status: COMPLETED | OUTPATIENT
Start: 2017-12-15 | End: 2017-12-15

## 2017-12-15 RX ADMIN — MIDAZOLAM HYDROCHLORIDE 14 MG: 1 INJECTION, SOLUTION INTRAMUSCULAR; INTRAVENOUS at 11:00

## 2017-12-15 RX ADMIN — FENTANYL CITRATE 100 MCG: 50 INJECTION, SOLUTION INTRAMUSCULAR; INTRAVENOUS at 11:45

## 2017-12-15 RX ADMIN — LORAZEPAM 2 MG: 2 INJECTION, SOLUTION INTRAMUSCULAR; INTRAVENOUS at 11:45

## 2017-12-15 NOTE — PROGRESS NOTES
TRANSFER - OUT REPORT:    Verbal report given to Cheryl(name) on [address]  being transferred to CPRU(unit) for routine progression of care       Report consisted of patients Situation, Background, Assessment and   Recommendations(SBAR). Information from the following report(s) SBAR and Procedure Summary was reviewed with the receiving nurse. Lines:       Opportunity for questions and clarification was provided.       Patient transported with:   Registered Nurse

## 2017-12-15 NOTE — IP AVS SNAPSHOT
Briseida De 
 
 
 [address] [facility] [address] [facility] 
[phone] Patient: Lurene Ganser MRN: [id number] [facility]:[birth year] Discharge Summary 12/15/2017 Lurene Ganser MRN[de-identified]  [id number] Admission Information Provider Pager Service Admission Date Expected D/C Date Iliana Jones MD  CARDIAC CATH LAB 12/15/2017 Actual LOS Patient Class 0 days OUTPATIENT Follow-up Information Follow up With Details Comments Contact Info Wendy Blue MD On 12/29/2017 Follow-up with Dr. Jamey Byrne @ 1:15pm in the [place] office [address] [place] [id number] 
[phone] My Medications ASK your physician about these medications Instructions Each Dose to Equal  
 Morning Noon Evening Bedtime * albuterol 2.5 mg /3 mL (0.083 %) nebulizer solution Commonly known as:  PROVENTIL VENTOLIN Your last dose was: Your next dose is:    
   
   
 2.5 mg by Nebulization route every four (4) hours as needed for Wheezing. 2.5 mg  
    
   
   
   
  
 * albuterol 90 mcg/actuation inhaler Commonly known as:  PROAIR HFA Your last dose was: Your next dose is: Take 2 Puffs by inhalation every four (4) hours as needed for Wheezing or Shortness of Breath (cough). Mouth care after use 2 Puff AMBIEN 10 mg tablet Generic drug:  zolpidem Your last dose was: Your next dose is: Take 20 mg by mouth nightly as needed. 20 mg  
    
   
   
   
  
 BD LUER-CASSIDY SYRINGE 3 mL 21 gauge x 1\" Syrg Generic drug:  Syringe with Needle (Disp) Your last dose was: Your next dose is:    
   
   
 1 Each by IntraMUSCular route every Sunday. 1 Each  
    
   
   
   
  
 cpap machine kit Your last dose was: Your next dose is:    
   
   
 9 cm qhs  
     
   
   
   
  
 CRESTOR 20 mg tablet Generic drug:  rosuvastatin Your last dose was: Your next dose is: Take 20 mg by mouth nightly. 20 mg  
    
   
   
   
  
 cyanocobalamin (vitamin B-12) 1,000 mcg/mL Kit Your last dose was: Your next dose is:    
   
   
 1 mL by IntraMUSCular route every Sunday. Indications: VITAMIN B12 DEFICIENCY  
 1 mL  
    
   
   
   
  
 CYMBALTA 60 mg capsule Generic drug:  DULoxetine Your last dose was: Your next dose is: Take 120 mg by mouth every morning. Indications: ANXIETY WITH DEPRESSION  
 120 mg  
    
   
   
   
  
 dicyclomine 10 mg capsule Commonly known as:  BENTYL Your last dose was: Your next dose is: Take 10 mg by mouth two (2) times a day. 10 mg  
    
   
   
   
  
 ELIQUIS 5 mg tablet Generic drug:  apixaban Your last dose was: Your next dose is: Take 5 mg by mouth two (2) times a day. 5 mg  
    
   
   
   
  
 fenofibrate nanocrystallized 145 mg tablet Commonly known as:  [employer] Your last dose was: Your next dose is: Take 145 mg by mouth every morning. 145 mg  
    
   
   
   
  
 fluticasone 50 mcg/actuation nasal spray Commonly known as:  Abraham [place]im[place] Your last dose was: Your next dose is:    
   
   
 1 - 2 sprays daily each nostril, blow nose prior to use and do not blow nose for 20 min. After use  
     
   
   
   
  
 folic acid 1 mg tablet Commonly known as:  [employer] Your last dose was: Your next dose is: Take 1 Tab by mouth daily. 1 mg  
    
   
   
   
  
 gabapentin 100 mg capsule Commonly known as:  NEURONTIN Your last dose was: Your next dose is: Take 200 mg by mouth two (2) times a day. 200 mg KlonoPIN 2 mg tablet Generic drug:  clonazePAM  
   
Your last dose was: Your next dose is: Take 2 mg by mouth three (3) times daily. 2 mg  
    
   
   
   
  
 magnesium oxide 400 mg tablet Commonly known as:  MAG-OX Your last dose was: Your next dose is: Take 400 mg by mouth every morning. Last dose 6/29/16  Indications: HYPOMAGNESEMIA  
 400 mg  
    
   
   
   
  
 MUCINEX 1,200 mg Ta12 ER tablet Generic drug:  guaiFENesin Your last dose was: Your next dose is: Take 1,200 mg by mouth as needed. 1200 mg  
    
   
   
   
  
 omeprazole 40 mg capsule Commonly known as:  PRILOSEC Your last dose was: Your next dose is: Take 40 mg by mouth nightly. 40 mg  
    
   
   
   
  
 sotalol 160 mg tablet Commonly known as:  Phyllis [place] Your last dose was: Your next dose is: Take 1 Tab by mouth two (2) times a day. 160 mg  
    
   
   
   
  
 tiotropium 18 mcg inhalation capsule Commonly known as:  [address] Your last dose was: Your next dose is: Take 1 Cap by inhalation daily. Mouth care after use 1 Cap * Notice: This list has 2 medication(s) that are the same as other medications prescribed for you. Read the directions carefully, and ask your doctor or other care provider to review them with you. General Information Please provide this summary of care documentation to your next provider. Allergies No Known Allergies Current Immunizations  Reviewed on 6/30/2017 Name Date Influenza Vaccine 10/1/2016, 10/1/2015, 11/1/2014, 12/16/2013, 9/1/2012 Pneumococcal Conjugate (PCV-13) 10/15/2016 Pneumococcal Polysaccharide (PPSV-23) 10/1/2015, 8/23/2013 Pneumococcal Vaccine (Unspecified Type) 11/1/2014 TB Skin Test (PPD) 1/1/2016 Discharge Instructions Discharge Instructions Electrical Cardioversion: What to Expect at [facility] Your Recovery Electrical cardioversion is a treatment for an abnormal heartbeat, such as atrial fibrillation, supraventricular tachycardia, or ventricular tachycardia (VT). It uses a brief electrical shock to reset your heart's rhythm. After cardioversion, you may have redness, like a sunburn, where the patches were. The medicines you got to make you sleepy may make you feel drowsy for the rest of the day. Your doctor may have you take medicines to help the heart beat normally and to prevent blood clots. This care sheet gives you a general idea about how long it will take for you to recover. But each person recovers at a different pace. Follow the steps below to feel better as quickly as possible. How can you care for yourself at home? Medicines ? · Be safe with medicines. Take your medicines exactly as prescribed. Call your doctor if you think you are having a problem with your medicine. You may take one or more of the following medicines: 
¨ Rate-control medicines to slow the heart rate. These include beta-blockers, calcium channel blockers, and digoxin. ¨ Rhythm control medicines that help the heart keep a normal rhythm. ¨ Blood thinners, also called anticoagulants, which help prevent blood clots. You will get more details on the specific medicines your doctor prescribes. Be sure you know how to take your medicines safely. ? · Do not take any vitamins, over-the-counter medicines, or herbal products without talking to your doctor first.  
Exercise ? · Start light exercise if your doctor says that it is okay. Even a small amount will help you get stronger, have more energy, and manage your stress. Walking is an easy way to get exercise. Start out by walking a little more than you did in the hospital. Bit by bit, increase the amount you walk. ? · When you exercise, watch for signs that your heart is working too hard.  You are pushing too hard if you cannot talk while you are exercising. If you become short of breath or dizzy or have chest pain, sit down and rest right away. ? · Check your pulse regularly. Place two fingers on the artery at the palm side of your wrist in line with your thumb. If your heartbeat seems uneven or fast, talk to your doctor. Other instructions ? · Ask your doctor when you can drive again. ? · Do not smoke. If you need help quitting, talk to your doctor about stop-smoking programs and medicines. These can increase your chances of quitting for good. ? · Limit alcohol. Follow-up care is a key part of your treatment and safety. Be sure to make and go to all appointments, and call your doctor if you are having problems. It's also a good idea to know your test results and keep a list of the medicines you take. When should you call for help? Call 911 anytime you think you may need emergency care. For example, call if: 
? · You passed out (lost consciousness). ? · You have chest pain or pressure. This may occur with: ¨ Sweating. ¨ Shortness of breath. ¨ Nausea or vomiting. ¨ Pain that spreads from the chest to the neck, jaw, or one or both shoulders or arms. ¨ A fast or uneven pulse. After calling 911, the  may tell you to chew 1 adult-strength or 2 to 4 low-dose aspirin. Wait for an ambulance. Do not try to drive yourself. ? · You have symptoms of a stroke. These may include: 
¨ Sudden numbness, tingling, weakness, or loss of movement in your face, arm, or leg, especially on libby side of your body. ¨ Sudden vision changes. ¨ Sudden trouble speaking. ¨ Sudden confusion or trouble understanding simple statements. ¨ Sudden problems with walking or balance. ¨ A sudden, severe headache that is different from past headaches. ?Call your doctor now or seek immediate medical care if: 
? · You feel dizzy or lightheaded, or you feel like you may faint. ? · You have a fast or irregular heartbeat. ?Watch closely for any changes in your health, and be sure to contact your doctor if you have any problems. Where can you learn more? Go to http://annalee-sapphire.info/. Enter A617 in the search box to learn more about \"Electrical Cardioversion: What to Expect at Home. \" Current as of: September 21, 2016 Content Version: 11.4 © 6252-8706 [employer]. Care instructions adapted under license by [employer] (which disclaims liability or warranty for this information). If you have questions about a medical condition or this instruction, always ask your healthcare professional. [address] any warranty or liability for your use of this information. Discharge Orders None  
  
` Patient Signature:  ____________________________________________________________ Date:  ____________________________________________________________  
  
 [employer] Provider Signature:  ____________________________________________________________ Date:  ____________________________________________________________

## 2017-12-15 NOTE — DISCHARGE INSTRUCTIONS
Electrical Cardioversion: What to Expect at Home  Your Recovery    Electrical cardioversion is a treatment for an abnormal heartbeat, such as atrial fibrillation, supraventricular tachycardia, or ventricular tachycardia (VT). It uses a brief electrical shock to reset your heart's rhythm. After cardioversion, you may have redness, like a sunburn, where the patches were. The medicines you got to make you sleepy may make you feel drowsy for the rest of the day. Your doctor may have you take medicines to help the heart beat normally and to prevent blood clots. This care sheet gives you a general idea about how long it will take for you to recover. But each person recovers at a different pace. Follow the steps below to feel better as quickly as possible. How can you care for yourself at home? Medicines  ? · Be safe with medicines. Take your medicines exactly as prescribed. Call your doctor if you think you are having a problem with your medicine. You may take one or more of the following medicines:  ¨ Rate-control medicines to slow the heart rate. These include beta-blockers, calcium channel blockers, and digoxin. ¨ Rhythm control medicines that help the heart keep a normal rhythm. ¨ Blood thinners, also called anticoagulants, which help prevent blood clots. You will get more details on the specific medicines your doctor prescribes. Be sure you know how to take your medicines safely. ? · Do not take any vitamins, over-the-counter medicines, or herbal products without talking to your doctor first.   Exercise  ? · Start light exercise if your doctor says that it is okay. Even a small amount will help you get stronger, have more energy, and manage your stress. Walking is an easy way to get exercise. Start out by walking a little more than you did in the hospital. Bit by bit, increase the amount you walk. ? · When you exercise, watch for signs that your heart is working too hard.  You are pushing too hard if you cannot talk while you are exercising. If you become short of breath or dizzy or have chest pain, sit down and rest right away. ? · Check your pulse regularly. Place two fingers on the artery at the palm side of your wrist in line with your thumb. If your heartbeat seems uneven or fast, talk to your doctor. Other instructions  ? · Ask your doctor when you can drive again. ? · Do not smoke. If you need help quitting, talk to your doctor about stop-smoking programs and medicines. These can increase your chances of quitting for good. ? · Limit alcohol. Follow-up care is a key part of your treatment and safety. Be sure to make and go to all appointments, and call your doctor if you are having problems. It's also a good idea to know your test results and keep a list of the medicines you take. When should you call for help? Call 911 anytime you think you may need emergency care. For example, call if:  ? · You passed out (lost consciousness). ? · You have chest pain or pressure. This may occur with:  ¨ Sweating. ¨ Shortness of breath. ¨ Nausea or vomiting. ¨ Pain that spreads from the chest to the neck, jaw, or one or both shoulders or arms. ¨ A fast or uneven pulse. After calling 911, the  may tell you to chew 1 adult-strength or 2 to 4 low-dose aspirin. Wait for an ambulance. Do not try to drive yourself. ? · You have symptoms of a stroke. These may include:  ¨ Sudden numbness, tingling, weakness, or loss of movement in your face, arm, or leg, especially on libby side of your body. ¨ Sudden vision changes. ¨ Sudden trouble speaking. ¨ Sudden confusion or trouble understanding simple statements. ¨ Sudden problems with walking or balance. ¨ A sudden, severe headache that is different from past headaches. ?Call your doctor now or seek immediate medical care if:  ? · You feel dizzy or lightheaded, or you feel like you may faint. ? · You have a fast or irregular heartbeat. ? Watch closely for any changes in your health, and be sure to contact your doctor if you have any problems. Where can you learn more? Go to http://annalee-sapphire.info/. Enter A617 in the search box to learn more about \"Electrical Cardioversion: What to Expect at Home. \"  Current as of: September 21, 2016  Content Version: 11.4  © 9673-0377 [employer]. Care instructions adapted under license by [employer] (which disclaims liability or warranty for this information). If you have questions about a medical condition or this instruction, always ask your healthcare professional. [place] [address] any warranty or liability for your use of this information.

## 2017-12-15 NOTE — PROGRESS NOTES
Report received from [facility] [place] [facility] for continued care following CVN. Sleepy, awakens easily. Diaphoretic at present.

## 2017-12-15 NOTE — PROGRESS NOTES
Patient received to [address] [place] [address] room # 5  Ambulatory from [facility]. Patient scheduled for CVN today with Dr Reese Ivey. Procedure reviewed & questions answered, voiced good understanding consent obtained & placed on chart. All medications and medical history reviewed. Will prep patient per orders. Patient & family updated on plan of care.

## 2017-12-15 NOTE — PROCEDURES
Brief Cardiac Procedure Note    Patient: Keegan Ruggiero MRN: [id number]  SSN: [id number]    YOB: 1955  Age: 60 y.o. Sex: male      Date of Procedure: 12/15/2017     Pre-procedure Diagnosis: Atrial Fibrillation/Atrial Flutter    Post-procedure Diagnosis: same     Procedure: Cardioversion    Brief Description of Procedure: cardioverted 150 joules     Performed By: Hilary Gilliam MD     Assistants: no     Anesthesia: Moderate Sedation    Estimated Blood Loss: Less than 10 mL      Specimens: None    Implants: None    Findings: afib RVR to sinus 061 joules     Complications: None    Recommendations: Continue medical therapy.     Signed By: Hilary Gilliam MD     December 15, 2017

## 2017-12-19 NOTE — PROCEDURES
[address] [place] [address]    Nava Trejo  MR#: [id number]  : 1955  ACCOUNT #: [de-identified]   DATE OF SERVICE: 12/15/2017    DATE OF PROCEDURE:  12/15/2017    CARDIOVERSION    CLINICAL:  This is a 39-year-old gentleman with a history of atrial fibrillation, on sotalol and Eliquis, reverted to atrial fibrillation with a rapid ventricular response. A repeat cardioversion was recommended. Risks and options discussed. He was brought to the cardiac prep area fasting. Oxygen saturation, blood pressure, and rhythm were monitored. Moderate sedation was administered by Tabatha Marie RN. Vital signs remained stable. Cardioversion was achieved using 150 joules of biphasic energy AP. Converted to sinus rhythm with PACs. No immediate complications. No blood loss. IMPRESSION:  Successful cardioversion.       MD ROSA Valentin / [place]  D: 12/15/2017 12:31     T: 2017 10:49  JOB #: [id number]  CC: Zeny Looney MD  CC: Court Fay

## 2018-02-22 NOTE — PROGRESS NOTES
Patient pre-assessment complete for Atrial fib ablation with DR Elizabeth Pandya scheduled for 18 at 8am, arrival time 6am. Patient verified using . Patient instructed to bring all home medications in labeled bottles on the day of procedure. NPO status reinforced. Patient instructed to HOLD eliquis x 1 day (last dose 18). Instructed they can take all other medications excluding vitamins & supplements. Patient verbalizes understanding of all instructions & denies any questions at this time.

## 2018-02-23 ENCOUNTER — ANESTHESIA EVENT (OUTPATIENT)
Dept: SURGERY | Age: 63
End: 2018-02-23
Payer: COMMERCIAL

## 2018-02-23 ENCOUNTER — ANESTHESIA (OUTPATIENT)
Dept: SURGERY | Age: 63
End: 2018-02-23
Payer: COMMERCIAL

## 2018-02-23 ENCOUNTER — HOSPITAL ENCOUNTER (OUTPATIENT)
Age: 63
Setting detail: OBSERVATION
Discharge: HOME | End: 2018-02-24
Attending: INTERNAL MEDICINE | Admitting: INTERNAL MEDICINE
Payer: COMMERCIAL

## 2018-02-23 ENCOUNTER — APPOINTMENT (OUTPATIENT)
Dept: CARDIAC CATH/INVASIVE PROCEDURES | Age: 63
End: 2018-02-23
Payer: COMMERCIAL

## 2018-02-23 ENCOUNTER — APPOINTMENT (OUTPATIENT)
Dept: GENERAL RADIOLOGY | Age: 63
End: 2018-02-23
Attending: INTERNAL MEDICINE
Payer: COMMERCIAL

## 2018-02-23 PROBLEM — I48.91 ATRIAL FIBRILLATION (HCC): Status: ACTIVE | Noted: 2018-02-23

## 2018-02-23 LAB
ACT BLD: 279 SECS (ref 70–128)
ACT BLD: 279 SECS (ref 70–128)
ACT BLD: 301 SECS (ref 70–128)
ANION GAP SERPL CALC-SCNC: 11 MMOL/L (ref 7–16)
BUN SERPL-MCNC: 16 MG/DL (ref 8–23)
CALCIUM SERPL-MCNC: 8.9 MG/DL (ref 8.3–10.4)
CHLORIDE SERPL-SCNC: 100 MMOL/L (ref 98–107)
CO2 SERPL-SCNC: 30 MMOL/L (ref 21–32)
CREAT SERPL-MCNC: 1.31 MG/DL (ref 0.8–1.5)
ERYTHROCYTE [DISTWIDTH] IN BLOOD BY AUTOMATED COUNT: 12.7 % (ref 11.9–14.6)
GLUCOSE SERPL-MCNC: 103 MG/DL (ref 65–100)
HCT VFR BLD AUTO: 48.6 % (ref 41.1–50.3)
HGB BLD-MCNC: 16.7 G/DL (ref 13.6–17.2)
INR PPP: 1.1
MAGNESIUM SERPL-MCNC: 2 MG/DL (ref 1.8–2.4)
MCH RBC QN AUTO: 35.2 PG (ref 26.1–32.9)
MCHC RBC AUTO-ENTMCNC: 34.4 G/DL (ref 31.4–35)
MCV RBC AUTO: 102.5 FL (ref 79.6–97.8)
PLATELET # BLD AUTO: 219 K/UL (ref 150–450)
PMV BLD AUTO: 9.9 FL (ref 10.8–14.1)
POTASSIUM SERPL-SCNC: 3.6 MMOL/L (ref 3.5–5.1)
PROTHROMBIN TIME: 14 SEC (ref 11.5–14.5)
RBC # BLD AUTO: 4.74 M/UL (ref 4.23–5.67)
SODIUM SERPL-SCNC: 141 MMOL/L (ref 136–145)
WBC # BLD AUTO: 6.3 K/UL (ref 4.3–11.1)

## 2018-02-23 PROCEDURE — 74011250636 HC RX REV CODE- 250/636

## 2018-02-23 PROCEDURE — 77030013687 HC GD NDL BARD -B

## 2018-02-23 PROCEDURE — 77030008477 HC STYL SATN SLP COVD -A: Performed by: ANESTHESIOLOGY

## 2018-02-23 PROCEDURE — 93312 ECHO TRANSESOPHAGEAL: CPT

## 2018-02-23 PROCEDURE — 85610 PROTHROMBIN TIME: CPT | Performed by: INTERNAL MEDICINE

## 2018-02-23 PROCEDURE — 77030027107 HC PTCH EXT REF CARTO3 J&J -F

## 2018-02-23 PROCEDURE — 85027 COMPLETE CBC AUTOMATED: CPT | Performed by: INTERNAL MEDICINE

## 2018-02-23 PROCEDURE — 74011000250 HC RX REV CODE- 250

## 2018-02-23 PROCEDURE — C1759 CATH, INTRA ECHOCARDIOGRAPHY: HCPCS

## 2018-02-23 PROCEDURE — 93623 PRGRMD STIMJ&PACG IV RX NFS: CPT

## 2018-02-23 PROCEDURE — 77030035291 HC TBNG PMP SMARTABLATE J&J -B

## 2018-02-23 PROCEDURE — 74011250637 HC RX REV CODE- 250/637: Performed by: INTERNAL MEDICINE

## 2018-02-23 PROCEDURE — 93622 COMP EP EVAL L VENTR PAC&REC: CPT

## 2018-02-23 PROCEDURE — 93005 ELECTROCARDIOGRAM TRACING: CPT | Performed by: INTERNAL MEDICINE

## 2018-02-23 PROCEDURE — C1732 CATH, EP, DIAG/ABL, 3D/VECT: HCPCS

## 2018-02-23 PROCEDURE — 85347 COAGULATION TIME ACTIVATED: CPT

## 2018-02-23 PROCEDURE — 76210000001 HC OR PH I REC 2.5 TO 3 HR: Performed by: INTERNAL MEDICINE

## 2018-02-23 PROCEDURE — C1893 INTRO/SHEATH, FIXED,NON-PEEL: HCPCS

## 2018-02-23 PROCEDURE — C1733 CATH, EP, OTHR THAN COOL-TIP: HCPCS

## 2018-02-23 PROCEDURE — 76060000036 HC ANESTHESIA 2.5 TO 3 HR: Performed by: INTERNAL MEDICINE

## 2018-02-23 PROCEDURE — 93613 INTRACARDIAC EPHYS 3D MAPG: CPT

## 2018-02-23 PROCEDURE — 76937 US GUIDE VASCULAR ACCESS: CPT

## 2018-02-23 PROCEDURE — 99218 HC RM OBSERVATION: CPT

## 2018-02-23 PROCEDURE — 80048 BASIC METABOLIC PNL TOTAL CA: CPT | Performed by: INTERNAL MEDICINE

## 2018-02-23 PROCEDURE — 83735 ASSAY OF MAGNESIUM: CPT | Performed by: INTERNAL MEDICINE

## 2018-02-23 PROCEDURE — 77030019908 HC STETH ESOPH SIMS -A: Performed by: ANESTHESIOLOGY

## 2018-02-23 PROCEDURE — C1894 INTRO/SHEATH, NON-LASER: HCPCS

## 2018-02-23 PROCEDURE — 93655 ICAR CATH ABLTJ DSCRT ARRHYT: CPT

## 2018-02-23 PROCEDURE — 77030003698 HC NDL TSEPTL DIAP -C

## 2018-02-23 PROCEDURE — 77030008703 HC TU ET UNCUF COVD -A: Performed by: ANESTHESIOLOGY

## 2018-02-23 PROCEDURE — 77030016570 HC BLNKT BAIR HGGR 3M -B: Performed by: ANESTHESIOLOGY

## 2018-02-23 PROCEDURE — 71045 X-RAY EXAM CHEST 1 VIEW: CPT

## 2018-02-23 PROCEDURE — 93662 INTRACARDIAC ECG (ICE): CPT

## 2018-02-23 PROCEDURE — 93656 COMPRE EP EVAL ABLTJ ATR FIB: CPT

## 2018-02-23 RX ORDER — FENOFIBRATE 145 MG/1
145 TABLET, COATED ORAL DAILY
Status: DISCONTINUED | OUTPATIENT
Start: 2018-02-24 | End: 2018-02-24 | Stop reason: HOSPADM

## 2018-02-23 RX ORDER — SOTALOL HYDROCHLORIDE 80 MG/1
160 TABLET ORAL EVERY 12 HOURS
Status: DISCONTINUED | OUTPATIENT
Start: 2018-02-23 | End: 2018-02-24 | Stop reason: HOSPADM

## 2018-02-23 RX ORDER — PROTAMINE SULFATE 10 MG/ML
INJECTION, SOLUTION INTRAVENOUS AS NEEDED
Status: DISCONTINUED | OUTPATIENT
Start: 2018-02-23 | End: 2018-02-23 | Stop reason: HOSPADM

## 2018-02-23 RX ORDER — GLYCOPYRROLATE 0.2 MG/ML
INJECTION INTRAMUSCULAR; INTRAVENOUS AS NEEDED
Status: DISCONTINUED | OUTPATIENT
Start: 2018-02-23 | End: 2018-02-23 | Stop reason: HOSPADM

## 2018-02-23 RX ORDER — MIDAZOLAM HYDROCHLORIDE 1 MG/ML
INJECTION, SOLUTION INTRAMUSCULAR; INTRAVENOUS AS NEEDED
Status: DISCONTINUED | OUTPATIENT
Start: 2018-02-23 | End: 2018-02-23 | Stop reason: HOSPADM

## 2018-02-23 RX ORDER — FENTANYL CITRATE 50 UG/ML
INJECTION, SOLUTION INTRAMUSCULAR; INTRAVENOUS AS NEEDED
Status: DISCONTINUED | OUTPATIENT
Start: 2018-02-23 | End: 2018-02-23 | Stop reason: HOSPADM

## 2018-02-23 RX ORDER — FAMOTIDINE 20 MG/1
20 TABLET, FILM COATED ORAL
Status: COMPLETED | OUTPATIENT
Start: 2018-02-23 | End: 2018-02-23

## 2018-02-23 RX ORDER — SUCCINYLCHOLINE CHLORIDE 20 MG/ML
INJECTION INTRAMUSCULAR; INTRAVENOUS AS NEEDED
Status: DISCONTINUED | OUTPATIENT
Start: 2018-02-23 | End: 2018-02-23 | Stop reason: HOSPADM

## 2018-02-23 RX ORDER — LIDOCAINE HYDROCHLORIDE 20 MG/ML
INJECTION, SOLUTION EPIDURAL; INFILTRATION; INTRACAUDAL; PERINEURAL AS NEEDED
Status: DISCONTINUED | OUTPATIENT
Start: 2018-02-23 | End: 2018-02-23 | Stop reason: HOSPADM

## 2018-02-23 RX ORDER — ACETAMINOPHEN 325 MG/1
650 TABLET ORAL
Status: DISCONTINUED | OUTPATIENT
Start: 2018-02-23 | End: 2018-02-24 | Stop reason: HOSPADM

## 2018-02-23 RX ORDER — HEPARIN SODIUM 5000 [USP'U]/100ML
INJECTION, SOLUTION INTRAVENOUS
Status: DISCONTINUED | OUTPATIENT
Start: 2018-02-23 | End: 2018-02-23 | Stop reason: HOSPADM

## 2018-02-23 RX ORDER — GABAPENTIN 100 MG/1
200 CAPSULE ORAL 2 TIMES DAILY
Status: DISCONTINUED | OUTPATIENT
Start: 2018-02-23 | End: 2018-02-24 | Stop reason: HOSPADM

## 2018-02-23 RX ORDER — HYDROCODONE BITARTRATE AND ACETAMINOPHEN 5; 325 MG/1; MG/1
1 TABLET ORAL
Status: DISCONTINUED | OUTPATIENT
Start: 2018-02-23 | End: 2018-02-24 | Stop reason: HOSPADM

## 2018-02-23 RX ORDER — FENOFIBRATE 160 MG/1
160 TABLET ORAL DAILY
Status: DISCONTINUED | OUTPATIENT
Start: 2018-02-24 | End: 2018-02-23

## 2018-02-23 RX ORDER — DULOXETIN HYDROCHLORIDE 60 MG/1
120 CAPSULE, DELAYED RELEASE ORAL
Status: DISCONTINUED | OUTPATIENT
Start: 2018-02-24 | End: 2018-02-24 | Stop reason: HOSPADM

## 2018-02-23 RX ORDER — SODIUM CHLORIDE 0.9 % (FLUSH) 0.9 %
5-10 SYRINGE (ML) INJECTION AS NEEDED
Status: DISCONTINUED | OUTPATIENT
Start: 2018-02-23 | End: 2018-02-24 | Stop reason: HOSPADM

## 2018-02-23 RX ORDER — PROPOFOL 10 MG/ML
INJECTION, EMULSION INTRAVENOUS AS NEEDED
Status: DISCONTINUED | OUTPATIENT
Start: 2018-02-23 | End: 2018-02-23 | Stop reason: HOSPADM

## 2018-02-23 RX ORDER — ONDANSETRON 2 MG/ML
INJECTION INTRAMUSCULAR; INTRAVENOUS AS NEEDED
Status: DISCONTINUED | OUTPATIENT
Start: 2018-02-23 | End: 2018-02-23 | Stop reason: HOSPADM

## 2018-02-23 RX ORDER — SUCRALFATE 1 G/1
1 TABLET ORAL
Status: DISCONTINUED | OUTPATIENT
Start: 2018-02-23 | End: 2018-02-24 | Stop reason: HOSPADM

## 2018-02-23 RX ORDER — SODIUM CHLORIDE 0.9 % (FLUSH) 0.9 %
5-10 SYRINGE (ML) INJECTION EVERY 8 HOURS
Status: DISCONTINUED | OUTPATIENT
Start: 2018-02-23 | End: 2018-02-24 | Stop reason: HOSPADM

## 2018-02-23 RX ORDER — CLONAZEPAM 1 MG/1
2 TABLET ORAL 3 TIMES DAILY
Status: DISCONTINUED | OUTPATIENT
Start: 2018-02-23 | End: 2018-02-24 | Stop reason: HOSPADM

## 2018-02-23 RX ORDER — SODIUM CHLORIDE, SODIUM LACTATE, POTASSIUM CHLORIDE, CALCIUM CHLORIDE 600; 310; 30; 20 MG/100ML; MG/100ML; MG/100ML; MG/100ML
INJECTION, SOLUTION INTRAVENOUS
Status: DISCONTINUED | OUTPATIENT
Start: 2018-02-23 | End: 2018-02-23 | Stop reason: HOSPADM

## 2018-02-23 RX ORDER — PANTOPRAZOLE SODIUM 40 MG/1
40 TABLET, DELAYED RELEASE ORAL EVERY 12 HOURS
Status: DISCONTINUED | OUTPATIENT
Start: 2018-02-23 | End: 2018-02-24 | Stop reason: HOSPADM

## 2018-02-23 RX ORDER — EPHEDRINE SULFATE 50 MG/ML
INJECTION, SOLUTION INTRAVENOUS AS NEEDED
Status: DISCONTINUED | OUTPATIENT
Start: 2018-02-23 | End: 2018-02-23 | Stop reason: HOSPADM

## 2018-02-23 RX ORDER — NEOSTIGMINE METHYLSULFATE 1 MG/ML
INJECTION INTRAVENOUS AS NEEDED
Status: DISCONTINUED | OUTPATIENT
Start: 2018-02-23 | End: 2018-02-23 | Stop reason: HOSPADM

## 2018-02-23 RX ORDER — DICYCLOMINE HYDROCHLORIDE 10 MG/1
10 CAPSULE ORAL 2 TIMES DAILY
Status: DISCONTINUED | OUTPATIENT
Start: 2018-02-23 | End: 2018-02-24 | Stop reason: HOSPADM

## 2018-02-23 RX ORDER — ZOLPIDEM TARTRATE 5 MG/1
20 TABLET ORAL
Status: DISCONTINUED | OUTPATIENT
Start: 2018-02-23 | End: 2018-02-24 | Stop reason: HOSPADM

## 2018-02-23 RX ORDER — ALBUTEROL SULFATE 90 UG/1
2 AEROSOL, METERED RESPIRATORY (INHALATION)
Status: DISCONTINUED | OUTPATIENT
Start: 2018-02-23 | End: 2018-02-24 | Stop reason: HOSPADM

## 2018-02-23 RX ORDER — ROCURONIUM BROMIDE 10 MG/ML
INJECTION, SOLUTION INTRAVENOUS AS NEEDED
Status: DISCONTINUED | OUTPATIENT
Start: 2018-02-23 | End: 2018-02-23 | Stop reason: HOSPADM

## 2018-02-23 RX ORDER — ROSUVASTATIN CALCIUM 20 MG/1
20 TABLET, COATED ORAL
Status: DISCONTINUED | OUTPATIENT
Start: 2018-02-23 | End: 2018-02-24 | Stop reason: HOSPADM

## 2018-02-23 RX ORDER — HEPARIN SODIUM 1000 [USP'U]/ML
INJECTION, SOLUTION INTRAVENOUS; SUBCUTANEOUS AS NEEDED
Status: DISCONTINUED | OUTPATIENT
Start: 2018-02-23 | End: 2018-02-23 | Stop reason: HOSPADM

## 2018-02-23 RX ADMIN — PROTAMINE SULFATE 50 MG: 10 INJECTION, SOLUTION INTRAVENOUS at 09:51

## 2018-02-23 RX ADMIN — ROCURONIUM BROMIDE 10 MG: 10 INJECTION, SOLUTION INTRAVENOUS at 08:26

## 2018-02-23 RX ADMIN — ONDANSETRON 4 MG: 2 INJECTION INTRAMUSCULAR; INTRAVENOUS at 09:27

## 2018-02-23 RX ADMIN — LIDOCAINE HYDROCHLORIDE 100 MG: 20 INJECTION, SOLUTION EPIDURAL; INFILTRATION; INTRACAUDAL; PERINEURAL at 07:44

## 2018-02-23 RX ADMIN — HEPARIN SODIUM 40 UNITS/KG/HR: 5000 INJECTION, SOLUTION INTRAVENOUS at 08:32

## 2018-02-23 RX ADMIN — SUCCINYLCHOLINE CHLORIDE 120 MG: 20 INJECTION INTRAMUSCULAR; INTRAVENOUS at 07:44

## 2018-02-23 RX ADMIN — FENTANYL CITRATE 50 MCG: 50 INJECTION, SOLUTION INTRAMUSCULAR; INTRAVENOUS at 07:44

## 2018-02-23 RX ADMIN — HYDROCODONE BITARTRATE AND ACETAMINOPHEN 1 TABLET: 5; 325 TABLET ORAL at 16:25

## 2018-02-23 RX ADMIN — SUCRALFATE 1 G: 1 TABLET ORAL at 16:25

## 2018-02-23 RX ADMIN — PROPOFOL 180 MG: 10 INJECTION, EMULSION INTRAVENOUS at 07:44

## 2018-02-23 RX ADMIN — DICYCLOMINE HYDROCHLORIDE 10 MG: 10 CAPSULE ORAL at 17:07

## 2018-02-23 RX ADMIN — ZOLPIDEM TARTRATE 20 MG: 5 TABLET ORAL at 21:58

## 2018-02-23 RX ADMIN — EPHEDRINE SULFATE 10 MG: 50 INJECTION, SOLUTION INTRAVENOUS at 08:56

## 2018-02-23 RX ADMIN — NEOSTIGMINE METHYLSULFATE 4 MG: 1 INJECTION INTRAVENOUS at 10:01

## 2018-02-23 RX ADMIN — Medication 10 ML: at 21:23

## 2018-02-23 RX ADMIN — CLONAZEPAM 2 MG: 1 TABLET ORAL at 16:24

## 2018-02-23 RX ADMIN — ROCURONIUM BROMIDE 10 MG: 10 INJECTION, SOLUTION INTRAVENOUS at 08:15

## 2018-02-23 RX ADMIN — HEPARIN SODIUM 20000 UNITS: 1000 INJECTION, SOLUTION INTRAVENOUS; SUBCUTANEOUS at 08:32

## 2018-02-23 RX ADMIN — PANTOPRAZOLE SODIUM 40 MG: 40 TABLET, DELAYED RELEASE ORAL at 13:57

## 2018-02-23 RX ADMIN — SUCRALFATE 1 G: 1 TABLET ORAL at 13:57

## 2018-02-23 RX ADMIN — GABAPENTIN 200 MG: 100 CAPSULE ORAL at 21:25

## 2018-02-23 RX ADMIN — ROSUVASTATIN CALCIUM 20 MG: 20 TABLET, COATED ORAL at 21:24

## 2018-02-23 RX ADMIN — MIDAZOLAM HYDROCHLORIDE 2 MG: 1 INJECTION, SOLUTION INTRAMUSCULAR; INTRAVENOUS at 07:50

## 2018-02-23 RX ADMIN — PANTOPRAZOLE SODIUM 40 MG: 40 TABLET, DELAYED RELEASE ORAL at 21:22

## 2018-02-23 RX ADMIN — ROCURONIUM BROMIDE 20 MG: 10 INJECTION, SOLUTION INTRAVENOUS at 07:53

## 2018-02-23 RX ADMIN — SUCRALFATE 1 G: 1 TABLET ORAL at 21:22

## 2018-02-23 RX ADMIN — GLYCOPYRROLATE 0.6 MG: 0.2 INJECTION INTRAMUSCULAR; INTRAVENOUS at 10:01

## 2018-02-23 RX ADMIN — Medication 10 ML: at 17:08

## 2018-02-23 RX ADMIN — SODIUM CHLORIDE, SODIUM LACTATE, POTASSIUM CHLORIDE, CALCIUM CHLORIDE: 600; 310; 30; 20 INJECTION, SOLUTION INTRAVENOUS at 07:31

## 2018-02-23 RX ADMIN — HEPARIN SODIUM 4000 UNITS: 1000 INJECTION, SOLUTION INTRAVENOUS; SUBCUTANEOUS at 09:20

## 2018-02-23 RX ADMIN — APIXABAN 5 MG: 5 TABLET, FILM COATED ORAL at 17:06

## 2018-02-23 RX ADMIN — EPHEDRINE SULFATE 5 MG: 50 INJECTION, SOLUTION INTRAVENOUS at 08:25

## 2018-02-23 RX ADMIN — FAMOTIDINE 20 MG: 20 TABLET, FILM COATED ORAL at 07:06

## 2018-02-23 RX ADMIN — SODIUM CHLORIDE, SODIUM LACTATE, POTASSIUM CHLORIDE, CALCIUM CHLORIDE: 600; 310; 30; 20 INJECTION, SOLUTION INTRAVENOUS at 09:42

## 2018-02-23 RX ADMIN — EPHEDRINE SULFATE 5 MG: 50 INJECTION, SOLUTION INTRAVENOUS at 08:15

## 2018-02-23 RX ADMIN — CLONAZEPAM 2 MG: 1 TABLET ORAL at 21:58

## 2018-02-23 RX ADMIN — ROCURONIUM BROMIDE 20 MG: 10 INJECTION, SOLUTION INTRAVENOUS at 09:03

## 2018-02-23 RX ADMIN — SOTALOL HYDROCHLORIDE 160 MG: 80 TABLET ORAL at 17:07

## 2018-02-23 RX ADMIN — HEPARIN SODIUM 4000 UNITS: 1000 INJECTION, SOLUTION INTRAVENOUS; SUBCUTANEOUS at 08:49

## 2018-02-23 NOTE — PROGRESS NOTES
Patient received to [address] [place] [address] room # 3  Ambulatory from [facility]. Patient scheduled for Fib Ablation today with Dr Tabby Woodall. Procedure reviewed & questions answered, voiced good understanding consent obtained & placed on chart. All medications and medical history reviewed. Will prep patient per orders. Patient & family updated on plan of care.

## 2018-02-23 NOTE — PROGRESS NOTES
Bedside and verbal shift report given to Kavitha Martinez RN by [facility], [facility], RN. meds at bedside verified narcs in lock box counted. Oncoming RN assumes all care of patient at this time.

## 2018-02-23 NOTE — ANESTHESIA POSTPROCEDURE EVALUATION
Post-Anesthesia Evaluation and Assessment    Patient: Emily Groves MRN: [id number]  SSN: [id number]    YOB: 1955  Age: 60 y.o. Sex: male       Cardiovascular Function/Vital Signs  Visit Vitals    /74    Pulse 82    Temp 36.7 °C (98 °F)    Resp 12    Ht 6' (1.829 m)    Wt 120.7 kg (266 lb)    SpO2 98%    BMI 36.08 kg/m2       Patient is status post general anesthesia for Procedure(s):  ABLATION OF ATRIAL FIBRILLATION. Nausea/Vomiting: None    Postoperative hydration reviewed and adequate. Pain:  Pain Scale 1: (P) Numeric (0 - 10) (02/23/18 1028)  Pain Intensity 1: (P) 0 (02/23/18 1028)   Managed    Neurological Status:   Neuro (WDL): (P) Exceptions to WDL (02/23/18 1028)  Neuro  Neurologic State: (P) Drowsy (02/23/18 1028)   At baseline    Mental Status and Level of Consciousness: Arousable    Pulmonary Status:   O2 Device: Nasal cannula (02/23/18 1028)   Adequate oxygenation and airway patent    Complications related to anesthesia: None    Post-anesthesia assessment completed.  No concerns    Signed By: Shravan Olivera MD     February 23, 2018

## 2018-02-23 NOTE — ANESTHESIA PREPROCEDURE EVALUATION
Anesthetic History               Review of Systems / Medical History  Patient summary reviewed and pertinent labs reviewed    Pulmonary    COPD: moderate    Sleep apnea: CPAP           Neuro/Psych              Cardiovascular    Hypertension        Dysrhythmias : atrial fibrillation  CAD    Exercise tolerance: >4 METS     GI/Hepatic/Renal     GERD: poorly controlled           Endo/Other        Obesity and arthritis     Other Findings              Physical Exam    Airway  Mallampati: II  TM Distance: > 6 cm  Neck ROM: normal range of motion   Mouth opening: Normal     Cardiovascular    Rhythm: irregular  Rate: abnormal         Dental  No notable dental hx       Pulmonary  Breath sounds clear to auscultation               Abdominal         Other Findings            Anesthetic Plan    ASA: 4  Anesthesia type: general            Anesthetic plan and risks discussed with: Patient

## 2018-02-23 NOTE — PROGRESS NOTES
TRANSFER - IN REPORT:    Verbal report received from [facility] on [employer] being received from [facility] for routine progression of care. Report consisted of patients Situation, Background, Assessment and Recommendations(SBAR). Information from the following reports was reviewed: Kardex, Procedure Summary, MAR and Recent Results. Opportunity for questions and clarification was provided. Patient received to room 327 and connected to telemetry monitor. Assessment completed and plan of care reviewed. bilateral venous groin sitec/d/i. Site clean, dry and intact without hematoma. BP cycling every 15 minutes. Patient oriented to room and call light. Patient voiced understanding of bedrest. Patient voiced understanding to use call light to communicate needs. Admission skin assessment completed with second RN and reveals the following: Patient's sacrum and heels are intact and no redness noted. Patient has bilateral groin sites with sutures s/p ablation. Scattered abrasions on BLE from patient working in his workshop as stated by patient.

## 2018-02-23 NOTE — H&P
[facility] Cardiology/Electrophyiology Consult                Date of  Admission: 2/23/2018  5:48 AM     CC/Reason for consult: afib ablaition    Hawa Trinh is a 60 y.o. male admitted for Paroxysmal atrial fibrillation ([facility] [I48.0]. Ricco Bruno is a very pleasant 58 yo male with a history of COPD / emphysema, ISABELLE maintained on CPAP, GERD, prostate CA, atrial fibrillation followed in the past by Dr. Jordy Nolan with past admissions with afib with RVR s/p sotalol loading with breakthrough afib presents for afib ablation. Pt afib is a chronic problem, length of time in afib is unknown (however, may be as long as >6 months duration), likely aggravated by his COPD and ISABELLE, no alleviating factors, with symptms mainly on shortness of breath. Pt denies chest pain, worsening shortness of breath, orthopnea, PND, leg swelling, passing out or near passing out spells. He is having breakthrough afib episodes with some associated fatigue that can last several hours.     Cardiac PMH: (Old records have been reviewed and summarized below)    Patient Active Problem List   Diagnosis Code    COPD (chronic obstructive pulmonary disease) ([facility] J44.9    Pulmonary emphysema (HCC) J43.9    GERD (gastroesophageal reflux disease) K21.9    Pure hypercholesterolemia E78.00    Impotence of organic origin N52.9    Malignant neoplasm of prostate ([facility] C61    Atrial fibrillation with RVR ([facility] [facility] I48.91    ISABELLE (obstructive sleep apnea) G47.33    Traumatic hematoma of forearm S50.10XA    Forearm mass, left R22.32    No-show for appointment Z53.29       Past Medical History:   Diagnosis Date    Adverse effect of anesthesia     pt woke up during last colonoscopy    Arrhythmia 6/30/16    hx of a fib- \"I'm out of it\" -per Dr Zachery Lebron; fingers    Bronchitis, chronic ([facility] 6/30/16    hx of     Carotid stenosis     bilat carotid stenosis    Chronic obstructive pulmonary disease ([facility] [facility] 6/30/16 affirms no SOB with 1 flight of steps; no 02; scheduled meds; last used rescue inhaler 2 d ago- avg at least 1 X wk    Chronic pain     OA- ALL MAJOR JOINTS    dyslipidemia     GERD (gastroesophageal reflux disease)     Hypertension     Malignant neoplasm of prostate ([facility] 12/18/2013    Prostate cancer ([facility] [address] Dx 12/2012    Psychiatric disorder     anxiety- depression    Sleep apnea     uses CPAP      Past Surgical History:   Procedure Laterality Date    COLONOSCOPY      COLONOSCOPY N/A 7/1/2016    COLONOSCOPY performed by Brendon Nolan MD at [facility] ENDOSCOPY    HX CYST REMOVAL      HX HEART CATHETERIZATION      HX OTHER SURGICAL  1983    lumbar     HX OTHER SURGICAL      pilonidal cyst     HX PROSTATECTOMY  2/21/2013     No Known Allergies   Family History   Problem Relation Age of Onset    Heart Attack Father      MI X 2 at age 61    Heart Disease Father     Hypertension Father     Stroke Father      CVA X 2- due to off anticoagulant    Cancer Mother     Kidney Disease Other     Other Other      Nephrolithiasis        No current facility-administered medications for this encounter.         Review of Symptoms:  A comprehensive ROS was performed with the pertinent positives and negatives mentioned in the HPI, all other systems reviewed and are negative       Physical Exam  Vitals:    02/23/18 0701   BP: 158/84   Pulse: 86   Resp: 18   Temp: 98 °F (36.7 °C)   SpO2: 97%   Weight: 120.7 kg (266 lb)   Height: 6' (1.829 m)       Physical Exam:  General appearance: Alert, well appearing, and in no distress   Eyes: Sclerae anicteric, Pupils equal, EOMI  ENT: Hearing grossly normal bilaterally, no oral lesions, normal dentition  CV: RRR, no M/R/G, no JVD, normal distal pulses, no lower extremity edema  Pulm: Clear to auscultation but distant BS  GI: Soft, nontender, nondistended, normal bowel sounds  Neuro: Alert and oriented  Skin: tatoos      Cardiographics    Telemetry:   ECG (Indpendently visualized and interpreted):  Echocardiogram:     Labs: No results for input(s): NA, K, MG, BUN, CREA, GLU, WBC, HGB, HCT, PLT, INR, TRIGL, LDL, HDL, HGBEXT, HCTEXT, PLTEXT, HGBEXT, HCTEXT, PLTEXT in the last 72 hours. No lab exists for component: TROPI, TCHOL, INREXT, INREXT     Assessment:      Active Problems:    Atrial fibrillation with RVR ([address] (1/11/2017)           Plan:   1. afib failing sotalol: Pt presents today with persistent afib that is symptomatic and failing sotalol. I had another discussion with the Pt today regarding rate and rhythm control strategies, rhythm control strategy treatment options including DCCV, antiarrhythmic therapy, catheter ablation and the combination of the above. I discussed at length the advantages and disadvantages of all treatment strategies and possible complications from cardiac ablation including femoral vascular access complications, damage to the heart tissue, bleeding around the heart, the need for emergent open heart surgery, heart attack, stroke, possible need for pacemaker post procedure, the rare but highly morbid atrioesophageal fistula or even death. Pt will undergo a transesophageal echocardiogram immediately prior to catheter insertion to completely exclude the possibility of left atrial appendage thrombus. --afib ablation, cont sotalol    Khalida Díaz MD, MS  Cardiology/Electrophysiology

## 2018-02-23 NOTE — PROGRESS NOTES
Bilateral groin benign with no hematoma or bleeding. Bedrest complete. Patient assisted to stand and ambulate. No changes in groin site. Patient tolerated well. Will continue to monitor.

## 2018-02-23 NOTE — PROCEDURES
Pre-Electrophysiology Diagnosis  1. Atrial fibrillation failing antiarrhythmic therapy     Procedure Performed  1. EPS afib ablation/pulmonary vein isolation  2. Left atrial pacing recording from the coronary sinus. 3. 3-D Electroanatomical mapping  4. Intracardiac echo  5. Ablation of second arrhythmia  6. LV pacing and recording  7. Transesophageal echo  8. Drug infusion    Anesthesia: General     Estimated Blood Loss: Less than 10 mL     Specimens: * No specimens in log *    Procedure in Detail:  The patient was brought to the electrophysiology lab in the fasting state. The patient was intubated by anesthesiology, a chisholm catheter inserted, and an esophageal temperature probe inserted and advanced to a location directly posterior to the LA at the level of the pulmonary veins. The esophageal temperature probe was repositioned throughout the case to a location as close the the ablation catheter as possible. If the esophageal temperature increased 0.5 degrees Celsius ablation was stopped and high flush performed until the temperature returned to baseline. A tranesophageal echocardiogram was performed directly prior to the procedure and was negative for a ALICIA thrombus (see full report in chart). A Ref-Star CARTO patch was placed, the patient was then prepped and draped in sterile fashion. Venous access was obtained under ultrasound guidance x3 using modified Seldinger technique, with placement of one 8Fr short sidearm sheaths into the right femoral vein and two 8.5 Fr SL-O 63cm long braided sheaths in the right femoral vein and placement of a 10Fr sheath into the left femoral vein. An intra-cardiac echo probe was prepped, and then inserted into an 10 Fr short sheath and used to localize the fossa ovalis and create LA and pulmonary vein anatomy utilizing [employer] [facility].   A BiosSegop[employer]oter Pentaray catheter was then inserted into an 8.5 SLO Fr sheath and used to create an electroanatomical map of the right atrium, including attempts at His localization and the os of the CS. Then a Smart Touch porous irrigated BW 3.5mm ablation catheter was used to map out the body of the CS. A decapolar [employer] Miller CS catheter was inserted via an 8Fr sheath and positioned in the mid coronary sinus. Next, a trans-septal needle was inserted into the SLO and was used to perform a trans-septal puncture with assistance from ICE (intra-cardiac echo) as well as the [address] [place] [address],[address] map and the right atrial impedence map. The SLO sheath was advanced into the LA. Total weight based heparin bolus was administered just after transeptal access, and systemic blood pressure monitored invasively. The patient was then placed on our heparin weight based nomogram for targeted ACT of 300-350 during the ablation procedure. A second trans-septal access was obtained with the ablation catheter. The Pentaray catheter was then inserted into the LA via the SL-O sheath and was used to map pulmonary vein potentials and target ablation. An 8 Fr, 3.5mm tip saline irrigated bidirectional D/F curve Thermo-cool SmartTouch catheter was used for RF ablation and ablation was performed at 25W power on the posterior wall, and 35W on the anterior wall, and roof. Antral pulmonary vein isolation was then performed for all 4 pulmonary veins. Entrance and exit block was demonstrated by left atrial pacing and within the pulmonary veins. Lack of any conducted atrial EGMs into the pulmonary veins was documented. Prior to ablation of the right antral pulmonary veins, the ablation catheter was used to pace at high output to try to localize the right phrenic nerve and map its location prior to ablation. Adenosine was injected (12 mg) to demonstrate the lack of early reconnection with the Pentaray in the PVs. There was no early reconnection with adenosine.  The ablation catheter was inserted into the LV, documented LV electrograms and was used to pace the LV for the EP study and for rescue pacing during adenosine infusion. Cavotricuspid Isthmus ablation (right atrial flutter)  Linear ablation across the cavo-tricuspid isthmus was performed starting with 1:2 A:V EGMs along the isthmus at 6pm [place]. The local electrogram activation sequence, differential pacing maneuvers and electrogram timing was used to demonstrate bidirectional block along the cavotricuspid isthmus. Post-Ablation trans-isthmus time: 137 msec    Next, baseline recordings and a diagnostic EP study was performed. The coronary sinus multipolar catheter was used to pace the left atrium during the EP study. The LA CS electrograms were documented and interpreted during the procedure. A comprehensive EP study was performed with 1:1 AV decremental pacing, atrial extrastimuli and ventricular pacing to assess retrograde conduction. The patient did not have sustained slow pathway conduction or evidence of an accessory pathway. Ventricular pacing revealed retrograde AV conduction which was concentric and decremental.    At the completion of the ablation and EPS, all catheters were removed, 50mg Protamine was administered and sheaths were pulled after placing a figure of 8 stitch. The patient tolerated the procedure well with no acute complications recognized. The patient left the EP lab in stable condition, in normal sinus rhythm. Just prior to pulling shealths, the ICE catheter was used to obtain ultrasound images and revealed no evidence of pericardial effusion. Baseline Intervals    QRS duration: 101 msec  NH interval: 149 msec  RR interval: 738 msec  AH interval: 94 msec  HV interval: 51 msec    EP Study    AV [place]: 360 msec  AV coty ERP: < or equal to 240 msec  Atrial ERP: 240 msec  VA [place]: 480 msec    Plan of care:  The patient will be placed in observation on telemetry, 4 hour flat time, followed by ambulation as tolerated and will continue anticoagulation as prescribed pre-procedure. Complications: None    Summary:   1. Successful pulmonary vein isolation x4.  2. Creation of a line of bidirectional block at the cavotricuspid isthmus (RA flutter ablation). 3.         Comprehensive EP study. 4. Pt tolerated the procedure well. 5. Family updated. Betty Díaz MD, MS  Clinical Cardiac Electrophysiology  2/23/2018  10:04 AM

## 2018-02-23 NOTE — DISCHARGE INSTRUCTIONS
Catheter Ablation Discharge Instructions    *Check the puncture site frequently for swelling or bleeding. If you see any bleeding, lie down and apply pressure over the area with a clean town or washcloth. Notify your doctor for any redness, swelling, drainage or oozing from the puncture site. Notify your doctor for any fever or chills. *If the leg with the puncture becomes cold, numb or painful, call [facility] at [phone]    *Activity should be limited for the next 48 hours. Climb stairs as little as possible and avoid any stooping, bending or strenuous activity for 48 hours. No heavy lifting (anything over 10 pounds) for three days. *Do not drive for 48 hours. *You may resume your usual diet. Drink more fluids than usual.    *Have a responsible person drive you home and stay with you for at least 24 hours after your heart catheterization/angiography. *You may remove the bandage from your Right and Left Groins in 24 hours. You may shower in 24 hours. No tub baths, hot tubs or swimming for one week. Do not place any lotions, creams, powders, ointments over the puncture site for one week. You may place a clean band-aid over the puncture site each day for 5 days. Change this daily. Electhysiology Study and Catheter Ablation: What to Expect at [address] [place] [address] had an electrophysiology study for a problem with your heartbeat. You may also have had a catheter ablation to try to correct the problem. You may have swelling, bruising, or a small lump around the site where the catheters went into your body. These should go away in 3 to 4 weeks. Do not exercise hard or lift anything heavy for a week. You may be able to go back to work and to your normal routine in 1 or 2 days. This care sheet gives you a general idea about how long it will take for you to recover. But each person recovers at a different pace. Follow the steps below to get better as quickly as possible.   How can you care for yourself at home? Activity  ? · For 1 week, do not lift anything that would make you strain. This may include heavy grocery bags and milk containers, a heavy briefcase or backpack, cat litter or dog food bags, a vacuum , or a child. ? · For 1 week, do not exercise hard or do any activity that could strain your blood vessels or the site where the catheters went into your body. ? · Ask your doctor when it is okay to have sex. ? · You may shower 24 to 48 hours after the procedure, if your doctor okays it. Pat the incision dry. Do not take a bath for 1 week, or until your doctor tells you it isokay. Diet  ? · You can eat your normal diet. If your stomach is upset, try bland, low-fat foods like plain rice, broiled chicken, toast, and yogurt. ? · Drink plenty of fluids (unless your doctor tells you not to). Medicines  ? · Your doctor will tell you if and when you can restart your medicines. He or she will also give you instructions about taking any new medicines. ? · If you take blood thinners, such as warfarin (Coumadin), clopidogrel (Plavix), or aspirin, be sure to talk to your doctor. He or she will tell you if and when to start taking those medicines again. Make sure that you understand exactly what your doctor wants you to do. ? · Ask your doctor if you can take acetaminophen (Tylenol) for pain. Do not take aspirin for 3 days, unless your doctor says it is okay. ? · Check with your doctor before you take aspirin or anti-inflammatory medicines to reduce pain and swelling. These include ibuprofen (Advil, Motrin) and naproxen (Aleve). ? · Make sure you know which heart medicines to continue and which ones to stop. Ask your doctor if you are not sure. ?Catheter site care  ? · You can remove your bandages the day after the procedure. Follow-up care is a key part of your treatment and safety.  Be sure to make and go to all appointments, and call your doctor if you are having problems. It's also a good idea to know your test results and keep a list of the medicines you take. When should you call for help? Call 911 anytime you think you may need emergency care. For example, call if:  ? · You passed out (lost consciousness). ? · You have symptoms of a heart attack. These may include:  ¨ Chest pain or pressure, or a strange feeling in the chest.  ¨ Sweating. ¨ Shortness of breath. ¨ Nausea or vomiting. ¨ Pain, pressure, or a strange feeling in the back, neck, jaw, or upper belly, or in one or both shoulders or arms. ¨ Lightheadedness or sudden weakness. ¨ A fast or irregular heartbeat. After you call 911, the  may tell you to chew 1 adult-strength or 2 to 4 low-dose aspirin. Wait for an ambulance. Do not try to drive yourself. ? · You have symptoms of a stroke. These may include:  ¨ Sudden numbness, tingling, weakness, or loss of movement in your face, arm, or leg, especially on libby side of your body. ¨ Sudden vision changes. ¨ Sudden trouble speaking. ¨ Sudden confusion or trouble understanding simple statements. ¨ Sudden problems with walking or balance. ¨ A sudden, severe headache that is different from past headaches. ?Call your doctor now or seek immediate medical care if:  ? · You are bleeding from the area where the catheter was put in your artery. ? · You have a fast-growing, painful lump at the catheter site. ? · You have signs of infection, such as:  ¨ Increased pain, swelling, warmth, or redness. ¨ Red streaks leading from the catheter site. ¨ Pus draining from the catheter site. ¨ A fever. ? · Your leg or arm looks blue or feels cold, numb, or tingly. ? Watch closely for any changes in your health, and be sure to contact your doctor if you have any problems. Where can you learn more? Go to http://annalee-sapphire.info/.   Enter [phone] in the search box to learn more about \"Electrophysiology Study and Catheter Ablation: What to Expect at Home. \"  Current as of: September 21, 2016  Content Version: 11.4  © 2992-7714 [employer]. Care instructions adapted under license by [employer] (which disclaims liability or warranty for this information). If you have questions about a medical condition or this instruction, always ask your healthcare professional. [address] any warranty or liability for your use of this information. Atrial Fibrillation: Care Instructions  Your Care Instructions    Atrial fibrillation is an irregular and often fast heartbeat. Treating this condition is important for several reasons. It can cause blood clots, which can travel from your heart to your brain and cause a stroke. If you have a fast heartbeat, you may feel lightheaded, dizzy, and weak. An irregular heartbeat can also increase your risk for heart failure. Atrial fibrillation is often the result of another heart condition, such as high blood pressure or coronary artery disease. Making changes to improve your heart condition will help you stay healthy and active. Follow-up care is a key part of your treatment and safety. Be sure to make and go to all appointments, and call your doctor if you are having problems. It's also a good idea to know your test results and keep a list of the medicines you take. How can you care for yourself at home? Medicines  ? · Take your medicines exactly as prescribed. Call your doctor if you think you are having a problem with your medicine. You will get more details on the specific medicines your doctor prescribes. ? · If your doctor has given you a blood thinner to prevent a stroke, be sure you get instructions about how to take your medicine safely. Blood thinners can cause serious bleeding problems. ? · Do not take any vitamins, over-the-counter drugs, or herbal products without talking to your doctor first.   ? Lifestyle changes  ? · Do not smoke.  Smoking can increase your chance of a stroke and heart attack. If you need help quitting, talk to your doctor about stop-smoking programs and medicines. These can increase your chances of quitting for good. ? · Eat a heart-healthy diet. ? · Stay at a healthy weight. Lose weight if you need to.   ? · Limit alcohol to 2 drinks a day for men and 1 drink a day for women. Too much alcohol can cause health problems. ? · Avoid colds and flu. Get a pneumococcal vaccine shot. If you have had one before, ask your doctor whether you need another dose. Get a flu shot every year. If you must be around people with colds or flu, wash your hands often. Activity  ? · If your doctor recommends it, get more exercise. Walking is a good choice. Bit by bit, increase the amount you walk every day. Try for at least 30 minutes on most days of the week. You also may want to swim, bike, or do other activities. Your doctor may suggest that you join a cardiac rehabilitation program so that you can have help increasing your physical activity safely. ? · Start light exercise if your doctor says it is okay. Even a small amount will help you get stronger, have more energy, and manage stress. Walking is an easy way to get exercise. Start out by walking a little more than you did in the hospital. Gradually increase the amount you walk. ? · When you exercise, watch for signs that your heart is working too hard. You are pushing too hard if you cannot talk while you are exercising. If you become short of breath or dizzy or have chest pain, sit down and rest immediately. ? · Check your pulse regularly. Place two fingers on the artery at the palm side of your wrist, in line with your thumb. If your heartbeat seems uneven or fast, talk to your doctor. When should you call for help? Call 911 anytime you think you may need emergency care. For example, call if:  ? · You have symptoms of a heart attack.  These may include:  ¨ Chest pain or pressure, or a strange feeling in the chest.  ¨ Sweating. ¨ Shortness of breath. ¨ Nausea or vomiting. ¨ Pain, pressure, or a strange feeling in the back, neck, jaw, or upper belly or in one or both shoulders or arms. ¨ Lightheadedness or sudden weakness. ¨ A fast or irregular heartbeat. After you call 911, the  may tell you to chew 1 adult-strength or 2 to 4 low-dose aspirin. Wait for an ambulance. Do not try to drive yourself. ? · You have symptoms of a stroke. These may include:  ¨ Sudden numbness, tingling, weakness, or loss of movement in your face, arm, or leg, especially on only one side of your body. ¨ Sudden vision changes. ¨ Sudden trouble speaking. ¨ Sudden confusion or trouble understanding simple statements. ¨ Sudden problems with walking or balance. ¨ A sudden, severe headache that is different from past headaches. ? · You passed out (lost consciousness). ?Call your doctor now or seek immediate medical care if:  ? · You have new or increased shortness of breath. ? · You feel dizzy or lightheaded, or you feel like you may faint. ? · Your heart rate becomes irregular. ? · You can feel your heart flutter in your chest or skip heartbeats. Tell your doctor if these symptoms are new or worse. ? Watch closely for changes in your health, and be sure to contact your doctor if you have any problems. Where can you learn more? Go to http://annalee-sapphire.info/. Enter U020 in the search box to learn more about \"Atrial Fibrillation: Care Instructions. \"  Current as of: September 21, 2016  Content Version: 11.4  © 1390-6980 [employer]. Care instructions adapted under license by [employer] (which disclaims liability or warranty for this information).  If you have questions about a medical condition or this instruction, always ask your healthcare professional. [address] any warranty or liability for your use of this information. DISCHARGE SUMMARY from Nurse    PATIENT INSTRUCTIONS:    After general anesthesia or intravenous sedation, for 24 hours or while taking prescription Narcotics:  · Limit your activities  · Do not drive and operate hazardous machinery  · Do not make important personal or business decisions  · Do  not drink alcoholic beverages  · If you have not urinated within 8 hours after discharge, please contact your surgeon on call. Report the following to your surgeon:  · Excessive pain, swelling, redness or odor of or around the surgical area  · Temperature over 100.5  · Nausea and vomiting lasting longer than 4 hours or if unable to take medications  · Any signs of decreased circulation or nerve impairment to extremity: change in color, persistent  numbness, tingling, coldness or increase pain  · Any questions    What to do at Home:  Recommended activity: No lifting or Strenuous exercise for 3 days    If you experience any of the following symptoms of unrelieved chest pain or increased shortness of breath, please follow up with [address] [place] [address] [address] [facility] at [phone]. *  Please give a list of your current medications to your Primary Care Provider. *  Please update this list whenever your medications are discontinued, doses are      changed, or new medications (including over-the-counter products) are added. *  Please carry medication information at all times in case of emergency situations. These are general instructions for a healthy lifestyle:    No smoking/ No tobacco products/ Avoid exposure to second hand smoke  Surgeon General's Warning:  Quitting smoking now greatly reduces serious risk to your health.     Obesity, smoking, and sedentary lifestyle greatly increases your risk for illness    A healthy diet, regular physical exercise & weight monitoring are important for maintaining a healthy lifestyle    You may be retaining fluid if you have a history of heart failure or if you experience any of the following symptoms:  Weight gain of 3 pounds or more overnight or 5 pounds in a week, increased swelling in our hands or feet or shortness of breath while lying flat in bed. Please call your doctor as soon as you notice any of these symptoms; do not wait until your next office visit. Recognize signs and symptoms of STROKE:    F-face looks uneven    A-arms unable to move or move unevenly    S-speech slurred or non-existent    T-time-call 911 as soon as signs and symptoms begin-DO NOT go       Back to bed or wait to see if you get better-TIME IS BRAIN. Warning Signs of HEART ATTACK     Call 911 if you have these symptoms:   Chest discomfort. Most heart attacks involve discomfort in the center of the chest that lasts more than a few minutes, or that goes away and comes back. It can feel like uncomfortable pressure, squeezing, fullness, or pain.  Discomfort in other areas of the upper body. Symptoms can include pain or discomfort in one or both arms, the back, neck, jaw, or stomach.  Shortness of breath with or without chest discomfort.  Other signs may include breaking out in a cold sweat, nausea, or lightheadedness. Don't wait more than five minutes to call 911 - MINUTES MATTER! Fast action can save your life. Calling 911 is almost always the fastest way to get lifesaving treatment. Emergency Medical Services staff can begin treatment when they arrive -- up to an hour sooner than if someone gets to the hospital by car. The discharge information has been reviewed with the patient. The patient verbalized understanding. Discharge medications reviewed with the patient and appropriate educational materials and side effects teaching were provided.   ___________________________________________________________________________________________________________________________________

## 2018-02-23 NOTE — IP AVS SNAPSHOT
[address] [address] 
[phone] Patient: Wilfrid Bentley MRN: [id number] A check mehul indicates which time of day the medication should be taken. My Medications START taking these medications Instructions Each Dose to Equal  
 Morning Noon Evening Bedtime  
 sucralfate 1 gram tablet Commonly known as:  uJandk Jacob Take 1 Tab by mouth Before breakfast, lunch, dinner and at bedtime for 7 days. 1 g CHANGE how you take these medications Instructions Each Dose to Equal  
 Morning Noon Evening Bedtime  
 fluticasone 50 mcg/actuation nasal spray Commonly known as:  Riky Ballesteros What changed:   
- when to take this 
- reasons to take this 
- additional instructions 1 - 2 sprays daily each nostril, blow nose prior to use and do not blow nose for 20 min. After use CONTINUE taking these medications Instructions Each Dose to Equal  
 Morning Noon Evening Bedtime * albuterol 2.5 mg /3 mL (0.083 %) nebulizer solution Commonly known as:  PROVENTIL VENTOLIN Your next dose is:  AS NEEDED. 2.5 mg by Nebulization route every four (4) hours as needed for Wheezing. 2.5 mg  
    
   
   
   
  
 * albuterol 90 mcg/actuation inhaler Commonly known as:  PROAIR HFA Your next dose is:  AS NEEDED. Take 2 Puffs by inhalation every four (4) hours as needed for Wheezing or Shortness of Breath (cough). Mouth care after use 2 Puff AMBIEN 10 mg tablet Generic drug:  zolpidem Take 20 mg by mouth nightly. 20 mg  
    
   
   
   
  
  
 apixaban 5 mg tablet Commonly known as:  Porfirio Selby Take 1 Tab by mouth two (2) times a day. 5 mg  
    
  
   
   
   
  
  
 cpap machine kit 9 cm qhs  
     
   
   
   
  
  
 CRESTOR 20 mg tablet Generic drug:  rosuvastatin Take 20 mg by mouth nightly. 20 mg  
    
   
   
   
  
  
 cyanocobalamin (vitamin B-12) 1,000 mcg/mL Kit Your next dose is:  AS DIRECTED.   
   
 1 mL by IntraMUSCular route every Sunday. Indications: VITAMIN B12 DEFICIENCY  
 1 mL  
    
   
   
   
  
 CYMBALTA 60 mg capsule Generic drug:  DULoxetine Take 120 mg by mouth every morning. Indications: ANXIETY WITH DEPRESSION  
 120 mg  
    
  
   
   
   
  
 dicyclomine 10 mg capsule Commonly known as:  BENTYL Take 10 mg by mouth two (2) times a day. 10 mg  
    
  
   
   
   
  
  
 fenofibrate nanocrystallized 145 mg tablet Commonly known as:  [employer] Take 145 mg by mouth every morning. 145 mg  
    
  
   
   
   
  
 folic acid 1 mg tablet Commonly known as:  [employer] Take 1 Tab by mouth daily. 1 mg  
    
  
   
   
   
  
 gabapentin 100 mg capsule Commonly known as:  NEURONTIN Take 200 mg by mouth two (2) times a day. 200 mg KlonoPIN 2 mg tablet Generic drug:  clonazePAM  
   
 Take 2 mg by mouth three (3) times daily. 2 mg  
    
  
   
  
   
   
  
  
 magnesium oxide 400 mg tablet Commonly known as:  MAG-OX Take 400 mg by mouth every morning. Last dose 6/29/16  Indications: HYPOMAGNESEMIA  
 400 mg  
    
  
   
   
   
  
 MUCINEX 1,200 mg Ta12 ER tablet Generic drug:  guaiFENesin Take 1,200 mg by mouth daily. 1200 mg  
    
  
   
   
   
  
 omeprazole 40 mg capsule Commonly known as:  PRILOSEC Take 40 mg by mouth nightly. 40 mg  
    
   
   
   
  
  
 sotalol 160 mg tablet Commonly known as:  [employer] Take 1 Tab by mouth two (2) times a day. 160 mg  
    
  
   
   
   
  
  
 tiotropium 18 mcg inhalation capsule Commonly known as:  [address] [facility] [employer] Take 1 Cap by inhalation daily. Mouth care after use 1 Cap VITAMIN D2 50,000 unit capsule Generic drug:  ergocalciferol Your next dose is:  AS DIRECTED. Take 50,000 Units by mouth every seven (7) days. [address]87 Units * Notice: This list has 2 medication(s) that are the same as other medications prescribed for you. Read the directions carefully, and ask your doctor or other care provider to review them with you. Where to Get Your Medications Information on where to get these meds will be given to you by the nurse or doctor. ! Ask your nurse or doctor about these medications  
  sucralfate 1 gram tablet

## 2018-02-23 NOTE — IP AVS SNAPSHOT
[address] [address] 
[phone] Patient: Jax Samllwood MRN: [id number] EZP:[id number] About your hospitalization You were admitted on:  February 23, 2018 You last received care in the:  [facility] 3 CLINICAL OBSERVATION You were discharged on:  February 24, 2018 Why you were hospitalized Your primary diagnosis was:  Not on File Your diagnoses also included:  Atrial Fibrillation With Rvr (Hcc), Atrial Fibrillation (Hcc) Follow-up Information Follow up With Details Comments Contact Info Pretty Sanchez MD On 3/13/2018 Tuesday, March 13th at 10:15am-- [place] office [address]  [address] [facility] [address] 
[phone] Carolyn Payne MD  As needed [address] [place] [address] [address] [facility] [address] 
[phone] Your Scheduled Appointments Tuesday March 13, 2018 10:15 AM EDT Office Visit with Pretty Sanchez MD  
[facility] CARDIOLOGY [address] [place] [address] OFFICE ([address] [place] [address]) 2 [facility] 
[address] [place] [facility]  
[phone] Discharge Orders None A check mehul indicates which time of day the medication should be taken. My Medications START taking these medications Instructions Each Dose to Equal  
 Morning Noon Evening Bedtime  
 sucralfate 1 gram tablet Commonly known as:  Glorine Held Take 1 Tab by mouth Before breakfast, lunch, dinner and at bedtime for 7 days. 1 g CHANGE how you take these medications Instructions Each Dose to Equal  
 Morning Noon Evening Bedtime  
 fluticasone 50 mcg/actuation nasal spray Commonly known as:  Memory Lust What changed:   
- when to take this 
- reasons to take this 
- additional instructions 1 - 2 sprays daily each nostril, blow nose prior to use and do not blow nose for 20 min. After use CONTINUE taking these medications Instructions Each Dose to Equal  
 Morning Noon Evening Bedtime * albuterol 2.5 mg /3 mL (0.083 %) nebulizer solution Commonly known as:  PROVENTIL VENTOLIN Your next dose is:  AS NEEDED. 2.5 mg by Nebulization route every four (4) hours as needed for Wheezing. 2.5 mg  
    
   
   
   
  
 * albuterol 90 mcg/actuation inhaler Commonly known as:  PROAIR HFA Your next dose is:  AS NEEDED. Take 2 Puffs by inhalation every four (4) hours as needed for Wheezing or Shortness of Breath (cough). Mouth care after use 2 Puff AMBIEN 10 mg tablet Generic drug:  zolpidem Take 20 mg by mouth nightly. 20 mg  
    
   
   
   
  
  
 apixaban 5 mg tablet Commonly known as:  Nelli Del Toroey Take 1 Tab by mouth two (2) times a day. 5 mg  
    
  
   
   
   
  
  
 cpap machine kit 9 cm qhs  
     
   
   
   
  
  
 CRESTOR 20 mg tablet Generic drug:  rosuvastatin Take 20 mg by mouth nightly. 20 mg  
    
   
   
   
  
  
 cyanocobalamin (vitamin B-12) 1,000 mcg/mL Kit Your next dose is:  AS DIRECTED.   
   
 1 mL by IntraMUSCular route every Sunday. Indications: VITAMIN B12 DEFICIENCY  
 1 mL  
    
   
   
   
  
 CYMBALTA 60 mg capsule Generic drug:  DULoxetine Take 120 mg by mouth every morning. Indications: ANXIETY WITH DEPRESSION  
 120 mg  
    
  
   
   
   
  
 dicyclomine 10 mg capsule Commonly known as:  BENTYL Take 10 mg by mouth two (2) times a day. 10 mg  
    
  
   
   
   
  
  
 fenofibrate nanocrystallized 145 mg tablet Commonly known as:  [employer] Take 145 mg by mouth every morning. 145 mg  
    
  
   
   
   
  
 folic acid 1 mg tablet Commonly known as:  [employer] Take 1 Tab by mouth daily. 1 mg  
    
  
   
   
   
  
 gabapentin 100 mg capsule Commonly known as:  NEURONTIN Take 200 mg by mouth two (2) times a day. 200 mg KlonoPIN 2 mg tablet Generic drug:  clonazePAM  
   
 Take 2 mg by mouth three (3) times daily. 2 mg  
    
  
   
  
   
   
  
  
 magnesium oxide 400 mg tablet Commonly known as:  MAG-OX Take 400 mg by mouth every morning. Last dose 6/29/16  Indications: HYPOMAGNESEMIA  
 400 mg  
    
  
   
   
   
  
 MUCINEX 1,200 mg Ta12 ER tablet Generic drug:  guaiFENesin Take 1,200 mg by mouth daily. 1200 mg  
    
  
   
   
   
  
 omeprazole 40 mg capsule Commonly known as:  PRILOSEC Take 40 mg by mouth nightly. 40 mg  
    
   
   
   
  
  
 sotalol 160 mg tablet Commonly known as:  Jane Reining Take 1 Tab by mouth two (2) times a day. 160 mg  
    
  
   
   
   
  
  
 tiotropium 18 mcg inhalation capsule Commonly known as:  [address] Take 1 Cap by inhalation daily. Mouth care after use 1 Cap VITAMIN D2 50,000 unit capsule Generic drug:  ergocalciferol Your next dose is:  AS DIRECTED. Take 50,000 Units by mouth every seven (7) days. [address] Units * Notice: This list has 2 medication(s) that are the same as other medications prescribed for you. Read the directions carefully, and ask your doctor or other care provider to review them with you. Where to Get Your Medications Information on where to get these meds will be given to you by the nurse or doctor. ! Ask your nurse or doctor about these medications  
  sucralfate 1 gram tablet Discharge Instructions Catheter Ablation Discharge Instructions *Check the puncture site frequently for swelling or bleeding. If you see any bleeding, lie down and apply pressure over the area with a clean town or washcloth. Notify your doctor for any redness, swelling, drainage or oozing from the puncture site. Notify your doctor for any fever or chills. *If the leg with the puncture becomes cold, numb or painful, call [facility] Cardiology at [phone] *Activity should be limited for the next 48 hours. Climb stairs as little as possible and avoid any stooping, bending or strenuous activity for 48 hours. No heavy lifting (anything over 10 pounds) for three days. *Do not drive for 48 hours. *You may resume your usual diet. Drink more fluids than usual. 
 
*Have a responsible person drive you home and stay with you for at least 24 hours after your heart catheterization/angiography. *You may remove the bandage from your Right and Left Groins in 24 hours. You may shower in 24 hours. No tub baths, hot tubs or swimming for one week. Do not place any lotions, creams, powders, ointments over the puncture site for one week. You may place a clean band-aid over the puncture site each day for 5 days. Change this daily. Electhysiology Study and Catheter Ablation: What to Expect at [facility] Your Recovery You had an electrophysiology study for a problem with your heartbeat. You may also have had a catheter ablation to try to correct the problem. You may have swelling, bruising, or a small lump around the site where the catheters went into your body. These should go away in 3 to 4 weeks. Do not exercise hard or lift anything heavy for a week. You may be able to go back to work and to your normal routine in 1 or 2 days. This care sheet gives you a general idea about how long it will take for you to recover. But each person recovers at a different pace. Follow the steps below to get better as quickly as possible. How can you care for yourself at home? Activity ? · For 1 week, do not lift anything that would make you strain. This may include heavy grocery bags and milk containers, a heavy briefcase or backpack, cat litter or dog food bags, a vacuum , or a child.   
? · For 1 week, do not exercise hard or do any activity that could strain your blood vessels or the site where the catheters went into your body. ? · Ask your doctor when it is okay to have sex. ? · You may shower 24 to 48 hours after the procedure, if your doctor okays it. Pat the incision dry. Do not take a bath for 1 week, or until your doctor tells you it isokay. Diet ? · You can eat your normal diet. If your stomach is upset, try bland, low-fat foods like plain rice, broiled chicken, toast, and yogurt. ? · Drink plenty of fluids (unless your doctor tells you not to). Medicines ? · Your doctor will tell you if and when you can restart your medicines. He or she will also give you instructions about taking any new medicines. ? · If you take blood thinners, such as warfarin (Coumadin), clopidogrel (Plavix), or aspirin, be sure to talk to your doctor. He or she will tell you if and when to start taking those medicines again. Make sure that you understand exactly what your doctor wants you to do. ? · Ask your doctor if you can take acetaminophen (Tylenol) for pain. Do not take aspirin for 3 days, unless your doctor says it is okay. ? · Check with your doctor before you take aspirin or anti-inflammatory medicines to reduce pain and swelling. These include ibuprofen (Advil, Motrin) and naproxen (Aleve). ? · Make sure you know which heart medicines to continue and which ones to stop. Ask your doctor if you are not sure. ?Catheter site care ? · You can remove your bandages the day after the procedure. Follow-up care is a key part of your treatment and safety. Be sure to make and go to all appointments, and call your doctor if you are having problems. It's also a good idea to know your test results and keep a list of the medicines you take. When should you call for help? Call 911 anytime you think you may need emergency care. For example, call if: 
? · You passed out (lost consciousness). ? · You have symptoms of a heart attack. These may include: ¨ Chest pain or pressure, or a strange feeling in the chest. 
¨ Sweating. ¨ Shortness of breath. ¨ Nausea or vomiting. ¨ Pain, pressure, or a strange feeling in the back, neck, jaw, or upper belly, or in one or both shoulders or arms. ¨ Lightheadedness or sudden weakness. ¨ A fast or irregular heartbeat. After you call 911, the  may tell you to chew 1 adult-strength or 2 to 4 low-dose aspirin. Wait for an ambulance. Do not try to drive yourself. ? · You have symptoms of a stroke. These may include: 
¨ Sudden numbness, tingling, weakness, or loss of movement in your face, arm, or leg, especially on libby side of your body. ¨ Sudden vision changes. ¨ Sudden trouble speaking. ¨ Sudden confusion or trouble understanding simple statements. ¨ Sudden problems with walking or balance. ¨ A sudden, severe headache that is different from past headaches. ?Call your doctor now or seek immediate medical care if: 
? · You are bleeding from the area where the catheter was put in your artery. ? · You have a fast-growing, painful lump at the catheter site. ? · You have signs of infection, such as: 
¨ Increased pain, swelling, warmth, or redness. ¨ Red streaks leading from the catheter site. ¨ Pus draining from the catheter site. ¨ A fever. ? · Your leg or arm looks blue or feels cold, numb, or tingly. ? Watch closely for any changes in your health, and be sure to contact your doctor if you have any problems. Where can you learn more? Go to http://annalee-sapphire.info/. Enter [phone] in the search box to learn more about \"Electrophysiology Study and Catheter Ablation: What to Expect at Home. \" Current as of: September 21, 2016 Content Version: 11.4 © 2641-5089 [employer]. Care instructions adapted under license by "[employer] (which disclaims liability or warranty for this information).  If you have questions about a medical condition or this instruction, always ask your healthcare professional. [address] any warranty or liability for your use of this information. Atrial Fibrillation: Care Instructions Your Care Instructions Atrial fibrillation is an irregular and often fast heartbeat. Treating this condition is important for several reasons. It can cause blood clots, which can travel from your heart to your brain and cause a stroke. If you have a fast heartbeat, you may feel lightheaded, dizzy, and weak. An irregular heartbeat can also increase your risk for heart failure. Atrial fibrillation is often the result of another heart condition, such as high blood pressure or coronary artery disease. Making changes to improve your heart condition will help you stay healthy and active. Follow-up care is a key part of your treatment and safety. Be sure to make and go to all appointments, and call your doctor if you are having problems. It's also a good idea to know your test results and keep a list of the medicines you take. How can you care for yourself at home? Medicines ? · Take your medicines exactly as prescribed. Call your doctor if you think you are having a problem with your medicine. You will get more details on the specific medicines your doctor prescribes. ? · If your doctor has given you a blood thinner to prevent a stroke, be sure you get instructions about how to take your medicine safely. Blood thinners can cause serious bleeding problems. ? · Do not take any vitamins, over-the-counter drugs, or herbal products without talking to your doctor first. ? Lifestyle changes ? · Do not smoke. Smoking can increase your chance of a stroke and heart attack. If you need help quitting, talk to your doctor about stop-smoking programs and medicines. These can increase your chances of quitting for good. ? · Eat a heart-healthy diet. ? · Stay at a healthy weight. Lose weight if you need to. ? · Limit alcohol to 2 drinks a day for men and 1 drink a day for women. Too much alcohol can cause health problems. ? · Avoid colds and flu. Get a pneumococcal vaccine shot. If you have had one before, ask your doctor whether you need another dose. Get a flu shot every year. If you must be around people with colds or flu, wash your hands often. Activity ? · If your doctor recommends it, get more exercise. Walking is a good choice. Bit by bit, increase the amount you walk every day. Try for at least 30 minutes on most days of the week. You also may want to swim, bike, or do other activities. Your doctor may suggest that you join a cardiac rehabilitation program so that you can have help increasing your physical activity safely. ? · Start light exercise if your doctor says it is okay. Even a small amount will help you get stronger, have more energy, and manage stress. Walking is an easy way to get exercise. Start out by walking a little more than you did in the hospital. Gradually increase the amount you walk. ? · When you exercise, watch for signs that your heart is working too hard. You are pushing too hard if you cannot talk while you are exercising. If you become short of breath or dizzy or have chest pain, sit down and rest immediately. ? · Check your pulse regularly. Place two fingers on the artery at the palm side of your wrist, in line with your thumb. If your heartbeat seems uneven or fast, talk to your doctor. When should you call for help? Call 911 anytime you think you may need emergency care. For example, call if: 
? · You have symptoms of a heart attack. These may include: ¨ Chest pain or pressure, or a strange feeling in the chest. 
¨ Sweating. ¨ Shortness of breath. ¨ Nausea or vomiting. ¨ Pain, pressure, or a strange feeling in the back, neck, jaw, or upper belly or in one or both shoulders or arms. ¨ Lightheadedness or sudden weakness. ¨ A fast or irregular heartbeat. After you call 911, the  may tell you to chew 1 adult-strength or 2 to 4 low-dose aspirin. Wait for an ambulance. Do not try to drive yourself. ? · You have symptoms of a stroke. These may include: 
¨ Sudden numbness, tingling, weakness, or loss of movement in your face, arm, or leg, especially on only one side of your body. ¨ Sudden vision changes. ¨ Sudden trouble speaking. ¨ Sudden confusion or trouble understanding simple statements. ¨ Sudden problems with walking or balance. ¨ A sudden, severe headache that is different from past headaches. ? · You passed out (lost consciousness). ?Call your doctor now or seek immediate medical care if: 
? · You have new or increased shortness of breath. ? · You feel dizzy or lightheaded, or you feel like you may faint. ? · Your heart rate becomes irregular. ? · You can feel your heart flutter in your chest or skip heartbeats. Tell your doctor if these symptoms are new or worse. ? Watch closely for changes in your health, and be sure to contact your doctor if you have any problems. Where can you learn more? Go to http://annalee-sapphire.info/. Enter U020 in the search box to learn more about \"Atrial Fibrillation: Care Instructions. \" Current as of: September 21, 2016 Content Version: 11.4 © 3069-7097 [employer]. Care instructions adapted under license by [employer] (which disclaims liability or warranty for this information). If you have questions about a medical condition or this instruction, always ask your healthcare professional. [place] [address] any warranty or liability for your use of this information. DISCHARGE SUMMARY from Nurse PATIENT INSTRUCTIONS: 
 
 
F-face looks uneven A-arms unable to move or move unevenly S-speech slurred or non-existent T-time-call 911 as soon as signs and symptoms begin-DO NOT go Back to bed or wait to see if you get better-TIME IS BRAIN. Warning Signs of HEART ATTACK Call 911 if you have these symptoms: 
? Chest discomfort. Most heart attacks involve discomfort in the center of the chest that lasts more than a few minutes, or that goes away and comes back. It can feel like uncomfortable pressure, squeezing, fullness, or pain. ? Discomfort in other areas of the upper body. Symptoms can include pain or discomfort in one or both arms, the back, neck, jaw, or stomach. ? Shortness of breath with or without chest discomfort. ? Other signs may include breaking out in a cold sweat, nausea, or lightheadedness. Don't wait more than five minutes to call [address]! Fast action can save your life. Calling 911 is almost always the fastest way to get lifesaving treatment. Emergency Medical Services staff can begin treatment when they arrive  up to an hour sooner than if someone gets to the hospital by car. The discharge information has been reviewed with the patient. The patient verbalized understanding. Discharge medications reviewed with the patient and appropriate educational materials and side effects teaching were provided. ___________________________________________________________________________________________________________________________________ MyChart Announcement We are excited to announce that we are making your provider's discharge notes available to you in [email or web address]-[email or web address].   You will see these notes when they are completed and signed by the physician that discharged you from your recent hospital stay. If you have any questions or concerns about any information you see in FFFavs, please call the Health Information Department where you were seen or reach out to your Primary Care Provider for more information about your plan of care. Introducing [facility]! Panda Valdes introduces FFFavs patient portal. Now you can access parts of your medical record, email your doctor's office, and request medication refills online. 1. In your internet browser, go to [email or web address] 2. Click on the First Time User? Click Here link in the Sign In box. You will see the New Member Sign Up page. 3. Enter your FFFavs Access Code exactly as it appears below. You will not need to use this code after youve completed the sign-up process. If you do not sign up before the expiration date, you must request a new code. · FFFavs Access Code: [email or web address] Expires: 5/10/2018  3:11 PM 
 
4. Enter the last four digits of your Social Security Number (xxxx) and Date of Birth (mm/dd/yyyy) as indicated and click Submit. You will be taken to the next sign-up page. 5. Create a FFFavs ID. This will be your FFFavs login ID and cannot be changed, so think of one that is secure and easy to remember. 6. Create a FFFavs password. You can change your password at any time. 7. Enter your Password Reset Question and Answer. This can be used at a later time if you forget your password. 8. Enter your e-mail address. You will receive e-mail notification when new information is available in [address] 9. Click Sign Up. You can now view and download portions of your medical record. 10. Click the Download Summary menu link to download a portable copy of your medical information. If you have questions, please visit the Frequently Asked Questions section of the FFFavs website. Remember, FFFavs is NOT to be used for urgent needs. For medical emergencies, dial 911. Now available from your iPhone and Android! Unresulted Labs-Please follow up with your PCP about these lab tests Order Current Status EKG, 12 LEAD, INITIAL Preliminary result EKG, 12 LEAD, INITIAL Preliminary result EKG, 12 LEAD, INITIAL Preliminary result Providers Seen During Your Hospitalization Provider Specialty Primary office phone Alirio Gilliam MD Cardiology [phone] Your Primary Care Physician (PCP) Primary Care Physician Office Phone Office Fax Juan Jose Pages  You are allergic to the following No active allergies Recent Documentation Height Weight BMI Smoking Status 1.829 m 121.9 kg 36.44 kg/m2 Former Smoker Emergency Contacts Name Discharge Info Relation Home Work [facility] [address] [facility] [3] Spouse [3] [phone] Patient Belongings The following personal items are in your possession at time of discharge: 
  Dental Appliances: None  Visual Aid: Glasses      Home Medications: Sent to pharmacy   Jewelry: Watch  Clothing: At bedside    Other Valuables: Cell Phone Please provide this summary of care documentation to your next provider. Signatures-by signing, you are acknowledging that this After Visit Summary has been reviewed with you and you have received a copy. Patient Signature:  ____________________________________________________________ Date:  ____________________________________________________________  
  
Natasha Ryan Provider Signature:  ____________________________________________________________ Date:  ____________________________________________________________

## 2018-02-24 VITALS
HEIGHT: 72 IN | TEMPERATURE: 98.2 F | HEART RATE: 86 BPM | BODY MASS INDEX: 36.39 KG/M2 | RESPIRATION RATE: 16 BRPM | SYSTOLIC BLOOD PRESSURE: 127 MMHG | OXYGEN SATURATION: 93 % | DIASTOLIC BLOOD PRESSURE: 80 MMHG | WEIGHT: 268.7 LBS

## 2018-02-24 LAB
ANION GAP SERPL CALC-SCNC: 9 MMOL/L (ref 7–16)
BUN SERPL-MCNC: 12 MG/DL (ref 8–23)
CALCIUM SERPL-MCNC: 8 MG/DL (ref 8.3–10.4)
CHLORIDE SERPL-SCNC: 103 MMOL/L (ref 98–107)
CO2 SERPL-SCNC: 28 MMOL/L (ref 21–32)
CREAT SERPL-MCNC: 1.04 MG/DL (ref 0.8–1.5)
GLUCOSE SERPL-MCNC: 108 MG/DL (ref 65–100)
MAGNESIUM SERPL-MCNC: 2 MG/DL (ref 1.8–2.4)
POTASSIUM SERPL-SCNC: 3.4 MMOL/L (ref 3.5–5.1)
SODIUM SERPL-SCNC: 140 MMOL/L (ref 136–145)

## 2018-02-24 PROCEDURE — 99218 HC RM OBSERVATION: CPT

## 2018-02-24 PROCEDURE — 74011250637 HC RX REV CODE- 250/637: Performed by: INTERNAL MEDICINE

## 2018-02-24 PROCEDURE — 93005 ELECTROCARDIOGRAM TRACING: CPT | Performed by: INTERNAL MEDICINE

## 2018-02-24 PROCEDURE — 36415 COLL VENOUS BLD VENIPUNCTURE: CPT | Performed by: INTERNAL MEDICINE

## 2018-02-24 PROCEDURE — 83735 ASSAY OF MAGNESIUM: CPT | Performed by: INTERNAL MEDICINE

## 2018-02-24 PROCEDURE — 94640 AIRWAY INHALATION TREATMENT: CPT

## 2018-02-24 PROCEDURE — 80048 BASIC METABOLIC PNL TOTAL CA: CPT | Performed by: INTERNAL MEDICINE

## 2018-02-24 RX ORDER — SUCRALFATE 1 G/1
1 TABLET ORAL
Qty: 30 TAB | Refills: 0 | Status: SHIPPED | OUTPATIENT
Start: 2018-02-24 | End: 2018-02-24

## 2018-02-24 RX ORDER — SUCRALFATE 1 G/1
1 TABLET ORAL
Qty: 30 TAB | Refills: 0 | Status: SHIPPED | OUTPATIENT
Start: 2018-02-24 | End: 2018-03-03

## 2018-02-24 RX ADMIN — CLONAZEPAM 2 MG: 1 TABLET ORAL at 09:34

## 2018-02-24 RX ADMIN — PANTOPRAZOLE SODIUM 40 MG: 40 TABLET, DELAYED RELEASE ORAL at 09:34

## 2018-02-24 RX ADMIN — DICYCLOMINE HYDROCHLORIDE 10 MG: 10 CAPSULE ORAL at 09:36

## 2018-02-24 RX ADMIN — SOTALOL HYDROCHLORIDE 160 MG: 80 TABLET ORAL at 05:36

## 2018-02-24 RX ADMIN — APIXABAN 5 MG: 5 TABLET, FILM COATED ORAL at 09:35

## 2018-02-24 RX ADMIN — Medication 10 ML: at 05:35

## 2018-02-24 RX ADMIN — FENOFIBRATE 145 MG: 145 TABLET, COATED ORAL at 09:37

## 2018-02-24 RX ADMIN — TIOTROPIUM BROMIDE 18 MCG: 18 CAPSULE ORAL; RESPIRATORY (INHALATION) at 08:29

## 2018-02-24 RX ADMIN — DULOXETIN HYDROCHLORIDE 120 MG: 60 CAPSULE, DELAYED RELEASE ORAL at 09:37

## 2018-02-24 RX ADMIN — SUCRALFATE 1 G: 1 TABLET ORAL at 09:34

## 2018-02-24 RX ADMIN — GABAPENTIN 200 MG: 100 CAPSULE ORAL at 09:36

## 2018-02-24 NOTE — DISCHARGE SUMMARY
[facility] Cardiology Discharge Summary     Patient ID:  Ed Mercury  [id number]  75 y.o.  1955     Admit date: 2/23/2018    Discharge date: 2/24/18    Admitting Physician: Geraldine Michel MD     Discharge Physician: Dr. Gauri Mccormick    Admission Diagnoses: Paroxysmal atrial fibrillation [facility]) [I48.0]    Discharge Diagnoses:    Diagnosis    Atrial fibrillation with RVR ([facility]    ISABELLE (obstructive sleep apnea)    Pure hypercholesterolemia    [facility]    COPD (chronic obstructive pulmonary disease) [facility]    Pulmonary emphysema ([facility]    GERD (gastroesophageal reflux disease)       Cardiology Procedures this admission:  AFib ablation, ANDI  Consults: None    Hospital Course: Pt was admitted with complaints of breakthrough AFib and SOB who presented for AFib ablation. He was taken to the EP lab by Dr. Elizabeth Pandya on 2/23/18 and underwent successful EPS with AFib ablation and pulmonary vein isolation. Patient tolerated the procedure well and was taken to the telemetry floor for recovery. The following morning patient was up feeling well without any complaints of chest pain or shortness of breath. Patient's bilateral groin sites were clean, dry and intact without hematoma or bruit. Patient's labs were stable. Patient was seen and examined by Dr. Gauri Mccormick and determined stable and ready for discharge. Patient was instructed on the importance of medication compliance including taking Eliquis and sotalol everyday without missing a dose. He will follow-up in the office with Dr. Elizabeth Pandya on 3/13/18 at 10:15AM in the [place] office. DISPOSITION: The patient is being discharged home on a low saturated fat, low cholesterol diet. Pt is instructed to advance activities as tolerated. Pt is instructed to watch bilat groin sites for bleeding/oozing, if seen Pt is instructed to apply firm pressure with clean cloth and call office at [phone].  Pt is instructed to watch for signs of infection which include increasing area of redness around site, fever/hot to touch or purulent drainage. Pt is instructed not to soak in a tub bath for 1 week, but it is okay to shower. Pt is instructed to call office or return to ER for immediate evaluation of any palpitations, shortness of breath or chest pain. Discharge Exam:   Visit Vitals    /60 (BP 1 Location: Left arm, BP Patient Position: At rest)    Pulse 79    Temp 98.1 °F (36.7 °C)    Resp 18    Ht 6' (1.829 m)    Wt 120.7 kg (266 lb)    SpO2 95%    BMI 36.08 kg/m2     Pt seen and examined by Dr. Josh Escalera: see his note for exam details.      Recent Results (from the past 24 hour(s))   METABOLIC PANEL, BASIC    Collection Time: 02/23/18  6:44 AM   Result Value Ref Range    Sodium 141 136 - 145 mmol/L    Potassium 3.6 3.5 - 5.1 mmol/L    Chloride 100 98 - 107 mmol/L    CO2 30 21 - 32 mmol/L    Anion gap 11 7 - 16 mmol/L    Glucose 103 (H) 65 - 100 mg/dL    BUN 16 8 - 23 MG/DL    Creatinine 1.31 0.8 - 1.5 MG/DL    GFR est AA >60 >60 ml/min/1.73m2    GFR est non-AA 59 (L) >60 ml/min/1.73m2    Calcium 8.9 8.3 - 10.4 MG/DL   CBC W/O DIFF    Collection Time: 02/23/18  6:44 AM   Result Value Ref Range    WBC 6.3 4.3 - 11.1 K/uL    RBC 4.74 4.23 - 5.67 M/uL    HGB 16.7 13.6 - 17.2 g/dL    HCT 48.6 41.1 - 50.3 %    .5 (H) 79.6 - 97.8 FL    MCH 35.2 (H) 26.1 - 32.9 PG    MCHC 34.4 31.4 - 35.0 g/dL    RDW 12.7 11.9 - 14.6 %    PLATELET 863 178 - 926 K/uL    MPV 9.9 (L) 10.8 - 14.1 FL   PROTHROMBIN TIME + INR    Collection Time: 02/23/18  6:44 AM   Result Value Ref Range    Prothrombin time 14.0 11.5 - 14.5 sec    INR 1.1     MAGNESIUM    Collection Time: 02/23/18  6:44 AM   Result Value Ref Range    Magnesium 2.0 1.8 - 2.4 mg/dL   EKG, 12 LEAD, INITIAL    Collection Time: 02/23/18  7:13 AM   Result Value Ref Range    Ventricular Rate 81 BPM    Atrial Rate 81 BPM    P-R Interval 172 ms    QRS Duration 90 ms    Q-T Interval 422 ms    QTC Calculation (Bezet) 490 ms Calculated P Axis 69 degrees    Calculated R Axis 55 degrees    Calculated T Axis 47 degrees    Diagnosis       Normal sinus rhythm  Prolonged QT  Abnormal ECG  When compared with ECG of 15-DEC-2017 10:47,  Sinus rhythm has replaced Atrial fibrillation  Vent. rate has decreased BY  61 BPM     POC ACTIVATED CLOTTING TIME    Collection Time: 02/23/18  8:45 AM   Result Value Ref Range    Activated Clotting Time (POC) 279 (H) 70 - 128 SECS   POC ACTIVATED CLOTTING TIME    Collection Time: 02/23/18  9:11 AM   Result Value Ref Range    Activated Clotting Time (POC) 279 (H) 70 - 128 SECS   POC ACTIVATED CLOTTING TIME    Collection Time: 02/23/18  9:34 AM   Result Value Ref Range    Activated Clotting Time (POC) 301 (H) 70 - 128 SECS   EKG, 12 LEAD, INITIAL    Collection Time: 02/23/18 11:32 AM   Result Value Ref Range    Ventricular Rate 86 BPM    Atrial Rate 86 BPM    P-R Interval 168 ms    QRS Duration 90 ms    Q-T Interval 412 ms    QTC Calculation (Bezet) 493 ms    Calculated P Axis 78 degrees    Calculated R Axis 57 degrees    Calculated T Axis 46 degrees    Diagnosis       Normal sinus rhythm  Prolonged QT  Abnormal ECG  When compared with ECG of 23-FEB-2018 07:13,  No significant change was found           Patient Instructions:     Current Discharge Medication List      START taking these medications    Details   sucralfate (CARAFATE) 1 gram tablet Take 1 Tab by mouth Before breakfast, lunch, dinner and at bedtime for 7 days. Qty: 30 Tab, Refills: 0         CONTINUE these medications which have NOT CHANGED    Details   apixaban (ELIQUIS) 5 mg tablet Take 1 Tab by mouth two (2) times a day. Qty: 60 Tab, Refills: 11      sotalol (BETAPACE) 160 mg tablet Take 1 Tab by mouth two (2) times a day. Qty: 60 Tab, Refills: 11      ergocalciferol (VITAMIN D2) 50,000 unit capsule Take 50,000 Units by mouth every seven (7) days.       albuterol (PROAIR HFA) 90 mcg/actuation inhaler Take 2 Puffs by inhalation every four (4) hours as needed for Wheezing or Shortness of Breath (cough). Mouth care after use  Qty: 1 Inhaler, Refills: 11    Associated Diagnoses: Chronic obstructive pulmonary disease, unspecified COPD type ([facility])      tiotropium (SPIRIVA WITH HANDIHALER) 18 mcg inhalation capsule Take 1 Cap by inhalation daily. Mouth care after use  Qty: 30 Cap, Refills: 11    Associated Diagnoses: Chronic obstructive pulmonary disease, unspecified COPD type ([facility]      cyanocobalamin, vitamin B-12, 1,000 mcg/mL kit 1 mL by IntraMUSCular route every Sunday. Indications: VITAMIN B12 DEFICIENCY      folic acid (FOLVITE) 1 mg tablet Take 1 Tab by mouth daily. Qty: 30 Tab, Refills: 11      dicyclomine (BENTYL) 10 mg capsule Take 10 mg by mouth two (2) times a day. clonazePAM (KLONOPIN) 2 mg tablet Take 2 mg by mouth three (3) times daily. guaiFENesin (MUCINEX) 1,200 mg Ta12 ER tablet Take 1,200 mg by mouth daily. magnesium oxide (MAG-OX) 400 mg tablet Take 400 mg by mouth every morning. Last dose 6/29/16  Indications: HYPOMAGNESEMIA      rosuvastatin (CRESTOR) 20 mg tablet Take 20 mg by mouth nightly. omeprazole (PRILOSEC) 40 mg capsule Take 40 mg by mouth nightly.      gabapentin (NEURONTIN) 100 mg capsule Take 200 mg by mouth two (2) times a day. fenofibrate nanocrystallized (TRICOR) 145 mg tablet Take 145 mg by mouth every morning. zolpidem (AMBIEN) 10 mg tablet Take 20 mg by mouth nightly. DULoxetine (CYMBALTA) 60 mg capsule Take 120 mg by mouth every morning. Indications: ANXIETY WITH DEPRESSION      cpap machine kit 9 cm qhs      albuterol (PROVENTIL VENTOLIN) 2.5 mg /3 mL (0.083 %) nebulizer solution 2.5 mg by Nebulization route every four (4) hours as needed for Wheezing. fluticasone (FLONASE) 50 mcg/actuation nasal spray 1 - 2 sprays daily each nostril, blow nose prior to use and do not blow nose for 20 min.  After use  Qty: 1 Bottle, Refills: 11    Associated Diagnoses: COPD (chronic obstructive pulmonary disease) ([facility]           Dr. Sylvia Walters

## 2018-02-24 NOTE — PROGRESS NOTES
Bedside and verbal shift report given to RALEIGH Ramsey by Grecia Ortiz RN. Opportunity for questions given. Narcs in lock box counted. Oncoming RN assumes all patient care at this time.

## 2018-02-24 NOTE — PROGRESS NOTES
IV and heart monitor removed. All discharge instructions and medications reviewed with patient and spouse. Patient verbalizes understanding. All questions answered. Home medications and narcotics returned to patient.

## 2018-02-24 NOTE — PROGRESS NOTES
Verbal bedside report given to Renay Vernon onchollis RN. Patient's situation, background, assessment and recommendations provided. Opportunity for questions provided. Oncoming RN assumed care of patient. Bilateral groin sites visualized.

## 2018-02-25 LAB
ATRIAL RATE: 81 BPM
ATRIAL RATE: 86 BPM
ATRIAL RATE: 90 BPM
CALCULATED P AXIS, ECG09: 69 DEGREES
CALCULATED P AXIS, ECG09: 78 DEGREES
CALCULATED P AXIS, ECG09: 81 DEGREES
CALCULATED R AXIS, ECG10: 55 DEGREES
CALCULATED R AXIS, ECG10: 57 DEGREES
CALCULATED R AXIS, ECG10: 72 DEGREES
CALCULATED T AXIS, ECG11: 46 DEGREES
CALCULATED T AXIS, ECG11: 47 DEGREES
CALCULATED T AXIS, ECG11: 65 DEGREES
DIAGNOSIS, 93000: NORMAL
P-R INTERVAL, ECG05: 166 MS
P-R INTERVAL, ECG05: 168 MS
P-R INTERVAL, ECG05: 172 MS
Q-T INTERVAL, ECG07: 392 MS
Q-T INTERVAL, ECG07: 412 MS
Q-T INTERVAL, ECG07: 422 MS
QRS DURATION, ECG06: 90 MS
QRS DURATION, ECG06: 90 MS
QRS DURATION, ECG06: 92 MS
QTC CALCULATION (BEZET), ECG08: 479 MS
QTC CALCULATION (BEZET), ECG08: 490 MS
QTC CALCULATION (BEZET), ECG08: 493 MS
VENTRICULAR RATE, ECG03: 81 BPM
VENTRICULAR RATE, ECG03: 86 BPM
VENTRICULAR RATE, ECG03: 90 BPM

## 2018-02-26 ENCOUNTER — HOSPITAL ENCOUNTER (OUTPATIENT)
Dept: LAB | Age: 63
Discharge: HOME | End: 2018-02-26
Payer: COMMERCIAL

## 2018-02-26 DIAGNOSIS — R06.02 SHORTNESS OF BREATH: ICD-10-CM

## 2018-02-26 PROBLEM — Z98.890 STATUS POST ABLATION OF ATRIAL FIBRILLATION: Status: ACTIVE | Noted: 2018-02-26

## 2018-02-26 PROBLEM — Z86.79 STATUS POST ABLATION OF ATRIAL FIBRILLATION: Status: ACTIVE | Noted: 2018-02-26

## 2018-02-26 LAB
BNP SERPL-MCNC: 179 PG/ML
D DIMER PPP FEU-MCNC: 1.34 UG/ML(FEU)

## 2018-02-26 PROCEDURE — 83880 ASSAY OF NATRIURETIC PEPTIDE: CPT | Performed by: INTERNAL MEDICINE

## 2018-02-26 PROCEDURE — 36415 COLL VENOUS BLD VENIPUNCTURE: CPT | Performed by: INTERNAL MEDICINE

## 2018-02-26 PROCEDURE — 85379 FIBRIN DEGRADATION QUANT: CPT | Performed by: INTERNAL MEDICINE

## 2018-07-05 PROBLEM — E66.01 SEVERE OBESITY (BMI 35.0-39.9): Status: ACTIVE | Noted: 2018-07-05

## 2018-07-05 PROBLEM — Z87.891 PERSONAL HISTORY OF TOBACCO USE, PRESENTING HAZARDS TO HEALTH: Status: ACTIVE | Noted: 2018-07-05

## 2018-07-18 ENCOUNTER — HOSPITAL ENCOUNTER (OUTPATIENT)
Dept: CT IMAGING | Age: 63
Discharge: HOME | End: 2018-07-18
Attending: INTERNAL MEDICINE
Payer: COMMERCIAL

## 2018-07-18 VITALS — BODY MASS INDEX: 33.53 KG/M2 | WEIGHT: 253 LBS | HEIGHT: 73 IN

## 2018-07-18 DIAGNOSIS — Z12.9 SCREENING FOR CANCER: ICD-10-CM

## 2018-07-18 DIAGNOSIS — Z87.891 PERSONAL HISTORY OF TOBACCO USE, PRESENTING HAZARDS TO HEALTH: ICD-10-CM

## 2018-07-18 PROCEDURE — G0297 LDCT FOR LUNG CA SCREEN: HCPCS

## 2018-07-18 NOTE — PROGRESS NOTES
Spoke with the patient in regards to their CT scan results, explained to the patient per Dr. Aureliano Law the CT scan was normal with no concerning findings and that we will keep our follow up plan in 6 months. Patient was fine with this and did not have any further questions or concerns at this time. // Kinjal DRUMMOND

## 2019-03-26 ENCOUNTER — ANESTHESIA EVENT (OUTPATIENT)
Dept: ENDOSCOPY | Age: 64
End: 2019-03-26
Payer: COMMERCIAL

## 2019-03-26 RX ORDER — SODIUM CHLORIDE 0.9 % (FLUSH) 0.9 %
5-40 SYRINGE (ML) INJECTION AS NEEDED
Status: CANCELLED | OUTPATIENT
Start: 2019-03-26

## 2019-03-26 RX ORDER — SODIUM CHLORIDE, SODIUM LACTATE, POTASSIUM CHLORIDE, CALCIUM CHLORIDE 600; 310; 30; 20 MG/100ML; MG/100ML; MG/100ML; MG/100ML
100 INJECTION, SOLUTION INTRAVENOUS CONTINUOUS
Status: CANCELLED | OUTPATIENT
Start: 2019-03-26

## 2019-03-26 RX ORDER — SODIUM CHLORIDE 0.9 % (FLUSH) 0.9 %
5-40 SYRINGE (ML) INJECTION EVERY 8 HOURS
Status: CANCELLED | OUTPATIENT
Start: 2019-03-26

## 2019-03-27 ENCOUNTER — ANESTHESIA (OUTPATIENT)
Dept: ENDOSCOPY | Age: 64
End: 2019-03-27
Payer: COMMERCIAL

## 2019-03-27 ENCOUNTER — HOSPITAL ENCOUNTER (OUTPATIENT)
Age: 64
Setting detail: OUTPATIENT SURGERY
Discharge: HOME | End: 2019-03-27
Attending: INTERNAL MEDICINE | Admitting: INTERNAL MEDICINE
Payer: COMMERCIAL

## 2019-03-27 VITALS
SYSTOLIC BLOOD PRESSURE: 119 MMHG | TEMPERATURE: 98 F | OXYGEN SATURATION: 97 % | RESPIRATION RATE: 14 BRPM | HEIGHT: 73 IN | DIASTOLIC BLOOD PRESSURE: 73 MMHG | WEIGHT: 234 LBS | HEART RATE: 70 BPM | BODY MASS INDEX: 31.01 KG/M2

## 2019-03-27 PROCEDURE — 88305 TISSUE EXAM BY PATHOLOGIST: CPT

## 2019-03-27 PROCEDURE — 74011250636 HC RX REV CODE- 250/636

## 2019-03-27 PROCEDURE — 77030013991 HC SNR POLYP ENDOSC BSC -A: Performed by: INTERNAL MEDICINE

## 2019-03-27 PROCEDURE — C1726 CATH, BAL DIL, NON-VASCULAR: HCPCS | Performed by: INTERNAL MEDICINE

## 2019-03-27 PROCEDURE — 76040000025: Performed by: INTERNAL MEDICINE

## 2019-03-27 PROCEDURE — 77030009426 HC FCPS BIOP ENDOSC BSC -B: Performed by: INTERNAL MEDICINE

## 2019-03-27 PROCEDURE — 74011250636 HC RX REV CODE- 250/636: Performed by: ANESTHESIOLOGY

## 2019-03-27 PROCEDURE — 76060000032 HC ANESTHESIA 0.5 TO 1 HR: Performed by: INTERNAL MEDICINE

## 2019-03-27 RX ORDER — PROPOFOL 10 MG/ML
INJECTION, EMULSION INTRAVENOUS AS NEEDED
Status: DISCONTINUED | OUTPATIENT
Start: 2019-03-27 | End: 2019-03-27 | Stop reason: HOSPADM

## 2019-03-27 RX ORDER — LIDOCAINE HYDROCHLORIDE 20 MG/ML
INJECTION, SOLUTION EPIDURAL; INFILTRATION; INTRACAUDAL; PERINEURAL AS NEEDED
Status: DISCONTINUED | OUTPATIENT
Start: 2019-03-27 | End: 2019-03-27 | Stop reason: HOSPADM

## 2019-03-27 RX ORDER — SODIUM CHLORIDE, SODIUM LACTATE, POTASSIUM CHLORIDE, CALCIUM CHLORIDE 600; 310; 30; 20 MG/100ML; MG/100ML; MG/100ML; MG/100ML
100 INJECTION, SOLUTION INTRAVENOUS CONTINUOUS
Status: DISCONTINUED | OUTPATIENT
Start: 2019-03-27 | End: 2019-03-27 | Stop reason: HOSPADM

## 2019-03-27 RX ORDER — PROPOFOL 10 MG/ML
INJECTION, EMULSION INTRAVENOUS
Status: DISCONTINUED | OUTPATIENT
Start: 2019-03-27 | End: 2019-03-27 | Stop reason: HOSPADM

## 2019-03-27 RX ADMIN — LIDOCAINE HYDROCHLORIDE 50 MG: 20 INJECTION, SOLUTION EPIDURAL; INFILTRATION; INTRACAUDAL; PERINEURAL at 09:33

## 2019-03-27 RX ADMIN — PROPOFOL 50 MG: 10 INJECTION, EMULSION INTRAVENOUS at 09:33

## 2019-03-27 RX ADMIN — SODIUM CHLORIDE, SODIUM LACTATE, POTASSIUM CHLORIDE, AND CALCIUM CHLORIDE 100 ML/HR: 600; 310; 30; 20 INJECTION, SOLUTION INTRAVENOUS at 09:00

## 2019-03-27 RX ADMIN — PROPOFOL 30 MG: 10 INJECTION, EMULSION INTRAVENOUS at 09:35

## 2019-03-27 RX ADMIN — PROPOFOL 200 MCG/KG/MIN: 10 INJECTION, EMULSION INTRAVENOUS at 09:35

## 2019-03-27 NOTE — ANESTHESIA POSTPROCEDURE EVALUATION
Procedure(s): ESOPHAGOGASTRODUODENOSCOPY (EGD) COLONOSCOPY/ 36 
ESOPHAGOGASTRODUODENAL (EGD) BIOPSY 
ESOPHAGEAL DILATION 
ENDOSCOPIC POLYPECTOMY. total IV anesthesia Anesthesia Post Evaluation Multimodal analgesia: multimodal analgesia not used between 6 hours prior to anesthesia start to PACU discharge Patient location during evaluation: bedside Patient participation: complete - patient participated Level of consciousness: awake and alert Pain management: adequate Airway patency: patent Anesthetic complications: no 
Cardiovascular status: acceptable Respiratory status: acceptable, spontaneous ventilation and nonlabored ventilation Hydration status: acceptable Post anesthesia nausea and vomiting:  none Vitals Value Taken Time /73 3/27/2019 10:31 AM  
Temp Pulse 70 3/27/2019 10:30 AM  
Resp 14 3/27/2019 10:30 AM  
SpO2 97 % 3/27/2019 10:30 AM

## 2019-03-27 NOTE — ANESTHESIA PREPROCEDURE EVALUATION
Relevant Problems No relevant active problems Anesthetic History History of awareness of surgery under anesthesia (some recall during a colonoscopy) Review of Systems / Medical History Patient summary reviewed, nursing notes reviewed and pertinent labs reviewed Pulmonary COPD: moderate Sleep apnea: CPAP Shortness of breath Pertinent negatives: No smoker (quit 2011- 80 pk yr hx) Neuro/Psych Psychiatric history Cardiovascular Hypertension Dysrhythmias (s/p ablation) : atrial fibrillation Exercise tolerance: <4 METS 
  
GI/Hepatic/Renal 
  
GERD: well controlled Endo/Other Obesity and arthritis Other Findings Physical Exam 
 
Airway Mallampati: III Mouth opening: Normal 
 
 Cardiovascular Rhythm: regular Dental 
No notable dental hx Pulmonary Breath sounds clear to auscultation Abdominal 
 
 
 
 Other Findings Anesthetic Plan ASA: 3 Anesthesia type: total IV anesthesia Induction: Intravenous Anesthetic plan and risks discussed with: Patient Discussed TIVA with its benefits (lower risk of nausea and sore throat, etc.) and risks including possible awareness, patient understands and elects to proceed

## 2019-03-27 NOTE — PROCEDURES
Endoscopic Gastroduodenoscopy Procedure Note    Indications: Dysphagia, GERD, Groves's    Anesthesia/Sedation: MAC IV     Pre-Procedure Physical:    Current Facility-Administered Medications   Medication Dose Route Frequency    lactated Ringers infusion  100 mL/hr IntraVENous CONTINUOUS      Patient has no known allergies. Patient Vitals for the past 8 hrs:   BP Temp Pulse Resp SpO2 Height Weight   03/27/19 0847 161/88  76 18 98 %     03/27/19 0834  98.4 °F (36.9 °C)    6' 1\" (1.854 m) 106.1 kg (234 lb)       Exam      Airway: clear   Heart: normal S1and S2    Lungs: clear bilateral  Abdomen: soft, nontender, bowel sounds present and normal in all quads   Mental Status: awake, alert and oriented to person, place and time          Procedure Details     Informed consent was obtained for the procedure, including conscious sedation. Risks of pancreatitis, infection, perforation, hemorrhage, adverse drug reaction and aspiration were discussed. The patient was placed in the left lateral decubitus position. Based on the pre-procedure assessment, including review of the patient's medical history, medications, allergies, and review of systems, he had been deemed to be an appropriate candidate for conscious sedation; he was therefore sedated with the medications listed below. He was monitored continuously with ECG tracing, pulse oximetry, blood pressure monitoring, and direct observation. The EGD gastroscope was inserted into the mouth and advanced under direct vision to the second portion of the duodenum. A careful inspection was made as the gastroscope was withdrawn, including a retroflexed view of the proximal stomach; findings and interventions are described below. Appropriate photodocumentation was obtained. Findings:   Esophagus- Irregular Z line. Biopsied. Esophagus empirically dilated with 15-18 mm balloon. No trauma.   Stomach- Normal.  Duodenum- Normal.    Therapies: Biopsy, Dilation    Specimens: None    Estimated Blood Loss: 0 cc           Complications:   None; patient tolerated the procedure well. Attending Attestation:  I performed the procedure. 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 Biopsied. Esophagus empirically dilated with 15-18 mm balloon.     Recommendations:  Continue daily Nexium  Repeat dilation prn  Repeat EGD for Groves's surveillance based on path result

## 2019-03-27 NOTE — DISCHARGE INSTRUCTIONS
Gastrointestinal Esophagogastroduodenoscopy (EGD) - Upper Exam Discharge Instructions    1. Call Dr. Clair Cole at [phone] for any problems or questions. 2. Contact the doctor's office for follow up appointment as directed. 3. Medication may cause drowsiness for several hours, therefore:  · Do not drive or operate machinery for remainder of the day. · No alcohol today. · Do not make any important or legal decisions for 24 hours. · Do not sign any legal documents for 24 hours. 5. Ordinarily, you may resume regular diet and activity after exam unless otherwise              specified by your physician. 6. For mild soreness in your throat you may use Cepacol throat lozenges or warm               salt-water gargles as needed. Gastrointestinal Colonoscopy/Flexible Sigmoidoscopy - Lower Exam Discharge Instructions  1. Call Dr. Clair Cole at [phone] for any problems or questions. 2. Contact the doctors office for follow up appointment as directed  3. Medication may cause drowsiness for several hours, therefore:  · Do not drive or operate machinery for reminder of the day. · No alcohol today. · Do not make any important or legal decisions for 24 hours. · Do not sign any legal documents for 24 hours. 4. Ordinarily, you may resume regular diet and activity after exam unless otherwise specified by your physician. 5. Because of air put into your colon during exam, you may experience some abdominal distension, relieved by the passage of gas, for several hours. 6. Contact your physician if you have any of the following:  · Excessive amount of bleeding - large amount when having a bowel movement. Occasional specks of blood with bowel movement would not be unusual.  · Severe abdominal pain  · Fever or Chills  7.  Polyp Removal - follow these additional instructions  · Clear liquid diet for the next meal (jell-o, broth, clear drinks)  · Soft diet for 24 hours, then resume regular diet   · Take Metamucil - 1 tablespoon in juice every morning for 3 days  · No Aspirin, Advil, Aleve, Nuprin, Ibuprofen, or medications that contain these drugs for 2 weeks. Any additional instructions: The office will contact you regarding the results of the pathology and to schedule any follow up appointments you may need. Instructions given to [facility] and other family members.

## 2019-03-27 NOTE — PROGRESS NOTES
Called Dr. Salvador Dunn to ask if an EKG was needed since patient on Betapace and no orders were received.

## 2019-03-27 NOTE — H&P
[facility] Consult Note Referring Physician:  
 
Consult Date: 3/27/2019 Reason for Consult: Groves's, dysphagia, and PMH colon polyps History of Present Illness:  Patient is a 71 y.o. male who is seen in consultation for Groves's, dysphagia, and PMH colon polyps. Past Medical History:  
Diagnosis Date  Adverse effect of anesthesia   
 pt woke up during last colonoscopy  Arrhythmia 6/30/16  
 hx of a fib- \"I'm out of it\" -per Dr Viral Rodriguez  Arthritis OA- ALL MAJOR JOINTS; fingers  Bronchitis, chronic ([address] 6/30/16  
 hx of  Carotid stenosis   
 bilat carotid stenosis  Chronic obstructive pulmonary disease ([address] 6/30/16  
 affirms no SOB with 1 flight of steps; no 02; scheduled meds; last used rescue inhaler 2 d ago- avg at least 1 X wk  Chronic pain OA- ALL MAJOR JOINTS  dyslipidemia  GERD (gastroesophageal reflux disease)  Hypertension  Malignant neoplasm of prostate ([facility] 12/18/2013  Prostate cancer ([facility] Dx 12/2012  Psychiatric disorder   
 anxiety- depression  Sleep apnea   
 uses CPAP Past Surgical History:  
Procedure Laterality Date  COLONOSCOPY  COLONOSCOPY N/A 7/1/2016 COLONOSCOPY performed by [facility] Spears MD at [facility] ENDOSCOPY  
 HX CYST REMOVAL    
 HX HEART CATHETERIZATION    
 HX OTHER SURGICAL  1983  
 lumbar  HX OTHER SURGICAL    
 pilonidal cyst   
 HX PROSTATECTOMY  2/21/2013 Family History Problem Relation Age of Onset  Heart Attack Father MI X 2 at age 61  
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  
  Alcohol/week: 0.0 oz  Drug use: No  
  Types: Prescription  Sexual activity: Not on file Hospital Medications: 
Current Facility-Administered Medications Medication Dose Route Frequency  lactated Ringers infusion  100 mL/hr IntraVENous CONTINUOUS Allergies: 
No Known Allergies Review of Systems: A comprehensive review of systems was negative except for that written in the History of Present Illness. Objective:  
 
Physical Exam: 
Vitals: 
Visit Vitals /88 Pulse 76 Temp 98.4 °F (36.9 °C) Resp 18 Ht 6' 1\" (1.854 m) Wt 106.1 kg (234 lb) SpO2 98% BMI 30.87 kg/m² General: No acute distress. Skin:  Extremities and face reveal no rashes. No chery erythema. No telangiectasias on the chest wall. HEENT: Sclerae anicteric. No oral ulcers. No abnormal pigmentation of the lips. The neck is supple. Cardiovascular: Regular rate and rhythm. No murmurs, gallops, or rubs. Respiratory:  Comfortable breathing  With no accessory muscle use. Clear breath sounds with no wheezes, rales, or rhonchi. GI:  Abdomen nondistended, soft, and nontender. Normal active bowel sounds. No enlargement of the liver or spleen. No masses palpable. Musculoskeletal:  No pitting edema of the lower legs. Extremities have good range of motion. Neurological:  Gross memory appears intact. Patient is alert and oriented. Psychiatric:  Mood appears appropriate with judgement intact. Lymphatic:  No cervical or supraclavicular adenopathy. Laboratory: No results for input(s): WBC, RBC, HGB, HCT, PLT, HGBEXT, HCTEXT, PLTEXT in the last 72 hours. No results for input(s): GLU, NA, K, CL, CO2, BUN, CREA, CA in the last 72 hours. No results for input(s): PTP, INR, APTT in the last 72 hours. No lab exists for component: INREXT No results for input(s): TBIL, CBIL, SGOT, GPT, AP, ALB, TP, AML, LPSE in the last 72 hours. Assessment: A 71 y.o. male with Groves's, dysphagia, and PMH colon polyps Plan: 
  
 
EGD and colonoscopy Signed By: Nimco Adorno MD   
 March 27, 2019

## 2019-03-27 NOTE — PROCEDURES
Colonoscopy Procedure Note    Indications: PMH colon polyps    Anesthesia/Sedation: MAC IV     Pre-Procedure Physical:  Current Facility-Administered Medications   Medication Dose Route Frequency    lactated Ringers infusion  100 mL/hr IntraVENous CONTINUOUS     Facility-Administered Medications Ordered in Other Encounters   Medication Dose Route Frequency    lidocaine (PF) (XYLOCAINE) 20 mg/mL (2 %) injection   IntraVENous PRN    propofol (DIPRIVAN) 10 mg/mL injection    PRN    propofol (DIPRIVAN) 10 mg/mL injection    CONTINUOUS      Patient has no known allergies. Patient Vitals for the past 8 hrs:   BP Temp Pulse Resp SpO2 Height Weight   03/27/19 0930 122/74  72 18 92 %     03/27/19 0847 161/88  76 18 98 %     03/27/19 0834  98.4 °F (36.9 °C)    6' 1\" (1.854 m) 106.1 kg (234 lb)       Exam:    Airway: clear   Heart: normal S1and S2    Lungs: clear bilateral  Abdomen: soft, nontender, bowel sounds present and normal in all quads   Mental Status: awake, alert and oriented to person, place and time        Procedure Details      Informed consent was obtained for the procedure, including sedation. Risks of perforation, hemorrhage, adverse drug reaction and aspiration were discussed. The patient was placed in the left lateral decubitus position. Based on the pre-procedure assessment, including review of the patient's medical history, medications, allergies, and review of systems, he had been deemed to be an appropriate candidate for conscious sedation; he was therefore sedated with the medications listed below. The patient was monitored continuously with ECG tracing, pulse oximetry, blood pressure monitoring, and direct observations. A rectal examination was performed. The colonoscope was inserted into the rectum and advanced under direct vision to the cecum. The quality of the colonic preparation was good.  A careful inspection was made as the colonoscope was withdrawn, including a retroflexed view of the rectum; findings and interventions are described below. Appropriate photodocumentation was obtained. Findings:   Single 1 cm sessile polyp removed by hot snare from the cecum. Specimens: Polyp    Estimated Blood Loss: 0 cc           Complications: None; patient tolerated the procedure well. Attending Attestation: I performed the procedure. Impression:    Single 1 cm sessile polyp removed by hot snare from the cecum. Recommendations:   Repeat colonoscopy in 3 years.

## 2019-03-27 NOTE — ROUTINE PROCESS
Patient discharged to car by Haydee Gonzalez with wife. Passing gas. Vital signs stable.  Seen by MD.

## 2021-07-28 ENCOUNTER — HOSPITAL ENCOUNTER (OUTPATIENT)
Dept: LAB | Age: 66
Discharge: HOME | End: 2021-07-28

## 2021-07-28 PROCEDURE — 88305 TISSUE EXAM BY PATHOLOGIST: CPT

## 2021-07-28 PROCEDURE — 88312 SPECIAL STAINS GROUP 1: CPT

## 2021-08-03 PROBLEM — I48.91 ATRIAL FIBRILLATION (HCC): Status: RESOLVED | Noted: 2018-02-23 | Resolved: 2021-08-03

## 2022-03-18 PROBLEM — Z98.890 STATUS POST ABLATION OF ATRIAL FIBRILLATION: Status: ACTIVE | Noted: 2018-02-26

## 2022-03-18 PROBLEM — Z87.891 PERSONAL HISTORY OF TOBACCO USE, PRESENTING HAZARDS TO HEALTH: Status: ACTIVE | Noted: 2018-07-05

## 2022-03-18 PROBLEM — R06.02 SHORTNESS OF BREATH: Status: ACTIVE | Noted: 2018-02-26

## 2022-03-18 PROBLEM — Z86.79 STATUS POST ABLATION OF ATRIAL FIBRILLATION: Status: ACTIVE | Noted: 2018-02-26

## 2022-03-19 PROBLEM — S50.10XA TRAUMATIC HEMATOMA OF FOREARM: Status: ACTIVE | Noted: 2017-09-25

## 2022-03-19 PROBLEM — Z91.199 NO-SHOW FOR APPOINTMENT: Status: ACTIVE | Noted: 2017-12-13

## 2022-03-19 PROBLEM — I48.91 ATRIAL FIBRILLATION WITH RVR (HCC): Status: ACTIVE | Noted: 2017-01-11

## 2022-03-19 PROBLEM — G47.33 OSA (OBSTRUCTIVE SLEEP APNEA): Status: ACTIVE | Noted: 2017-01-11

## 2022-03-19 PROBLEM — R22.32 FOREARM MASS, LEFT: Status: ACTIVE | Noted: 2017-10-23

## 2022-08-24 ENCOUNTER — NURSE ONLY (OUTPATIENT)
Dept: UROLOGY | Age: 67
End: 2022-08-24

## 2022-08-24 DIAGNOSIS — C61 MALIGNANT NEOPLASM OF PROSTATE (HCC): ICD-10-CM

## 2022-08-24 DIAGNOSIS — C61 MALIGNANT NEOPLASM OF PROSTATE (HCC): Primary | ICD-10-CM

## 2022-08-25 LAB — PSA SERPL DL<=0.01 NG/ML-MCNC: <0.006 NG/ML (ref 0–4)

## 2022-08-31 ENCOUNTER — OFFICE VISIT (OUTPATIENT)
Dept: UROLOGY | Age: 67
End: 2022-08-31
Payer: MEDICARE

## 2022-08-31 DIAGNOSIS — C61 MALIGNANT NEOPLASM OF PROSTATE (HCC): Primary | ICD-10-CM

## 2022-08-31 DIAGNOSIS — N52.9 ERECTILE DYSFUNCTION, UNSPECIFIED ERECTILE DYSFUNCTION TYPE: ICD-10-CM

## 2022-08-31 LAB
BILIRUBIN, URINE, POC: NEGATIVE
BLOOD URINE, POC: NEGATIVE
GLUCOSE URINE, POC: NEGATIVE
KETONES, URINE, POC: NEGATIVE
LEUKOCYTE ESTERASE, URINE, POC: NEGATIVE
NITRITE, URINE, POC: NEGATIVE
PH, URINE, POC: 7.5 (ref 4.6–8)
PROTEIN,URINE, POC: NEGATIVE
SPECIFIC GRAVITY, URINE, POC: 1.02 (ref 1–1.03)
URINALYSIS CLARITY, POC: NORMAL
URINALYSIS COLOR, POC: NORMAL
UROBILINOGEN, POC: NORMAL

## 2022-08-31 PROCEDURE — 99214 OFFICE O/P EST MOD 30 MIN: CPT | Performed by: UROLOGY

## 2022-08-31 PROCEDURE — 81003 URINALYSIS AUTO W/O SCOPE: CPT | Performed by: UROLOGY

## 2022-08-31 PROCEDURE — 1123F ACP DISCUSS/DSCN MKR DOCD: CPT | Performed by: UROLOGY

## 2022-08-31 NOTE — PROGRESS NOTES
[facility]  [place], [address] [facility]  [phone]    Liz Reap  : 1955     HPI   75 y.o., male returns in follow up for CaP. S/P RALP on 13. Final path was [facility] 6, T2aNx with neg margins. Reports improved GARY since losing wt. Taking Cialis prior to surgery. Cont to report ED post op. Failed pdei's and inj tx due to pain with injection. He elected not to pursue IPP. PSA remains und on 22. No new complaints.          Past Medical History:   Diagnosis Date    Adverse effect of anesthesia     pt woke up during last colonoscopy    Arrhythmia 16    hx of a fib- \"I'm out of it\" -per Dr Hemant Toro; fingers    Bronchitis, chronic ([facility] [address] 16    hx of     Carotid stenosis     bilat carotid stenosis    Chronic obstructive pulmonary disease ([facility] [address] 16    affirms no SOB with 1 flight of steps; no 02; scheduled meds; last used rescue inhaler 2 d ago- avg at least 1 X wk    Chronic pain     OA- ALL MAJOR JOINTS    GERD (gastroesophageal reflux disease)     Hypertension     Malignant neoplasm of prostate ([address] 2013    Other ill-defined conditions(799.89)     Prostate cancer ([address] Dx 2012    Psychiatric disorder     anxiety- depression    Sleep apnea     uses CPAP     Past Surgical History:   Procedure Laterality Date    CARDIAC CATHETERIZATION      COLONOSCOPY      COLONOSCOPY N/A 2016    COLONOSCOPY performed by Alexandra Yang MD at [facility] ENDOSCOPY    COLONOSCOPY N/A 3/27/2019    COLONOSCOPY/ 36 performed by Elisabeth Payne MD at [facility] ENDOSCOPY    CYST REMOVAL      OTHER SURGICAL HISTORY      pilonidal cyst     OTHER SURGICAL HISTORY  1983    lumbar     PROSTATECTOMY  2013     Current Outpatient Medications   Medication Sig Dispense Refill    albuterol sulfate  (90 Base) MCG/ACT inhaler Inhale 2 puffs into the lungs every 4 hours as needed      albuterol (PROVENTIL) (2.5 MG/3ML) 0.083% nebulizer solution Inhale 2.5 mg into the lungs every 4 hours as needed      apixaban (ELIQUIS) 5 MG TABS tablet Take 5 mg by mouth 2 times daily      cyanocobalamin 1000 MCG/ML injection Inject 1 mL into the muscle      diclofenac (VOLTAREN) 75 MG EC tablet Take by mouth      dicyclomine (BENTYL) 10 MG capsule Take 10 mg by mouth 2 times daily      DULoxetine (CYMBALTA) 60 MG extended release capsule Take 120 mg by mouth      ergocalciferol (ERGOCALCIFEROL) 1.25 MG (68704 UT) capsule Take 50,000 Units by mouth every 7 days      fenofibrate (TRICOR) 145 MG tablet Take 145 mg by mouth      Fluticasone furoate-vilanterol (BREO ELLIPTA) 200-25 MCG/INH AEPB inhaler Inhale 1 puff into the lungs daily      fluticasone (FLONASE) 50 MCG/ACT nasal spray 1 - 2 sprays daily each nostril, blow nose prior to use and do not blow nose for 20 min. After use      folic acid (FOLVITE) 1 MG tablet Take 1 mg by mouth daily      furosemide (LASIX) 40 MG tablet TAKE 1 TABLET BY MOUTH EVERY DAY      gabapentin (NEURONTIN) 100 MG capsule Take 200 mg by mouth 2 times daily. guaiFENesin 1200 MG TB12 Take 1,200 mg by mouth daily      lurasidone (LATUDA) 40 MG TABS tablet Take by mouth      magnesium oxide (MAG-OX) 400 MG tablet Take 400 mg by mouth      omeprazole (PRILOSEC) 40 MG delayed release capsule Take 40 mg by mouth      rosuvastatin (CRESTOR) 20 MG tablet Take 20 mg by mouth      sotalol (BETAPACE) 160 MG tablet Take 160 mg by mouth 2 times daily      tiotropium (SPIRIVA) 18 MCG inhalation capsule Inhale 18 mcg into the lungs daily       No current facility-administered medications for this visit.      No Known Allergies  Social History     Socioeconomic History    Marital status:      Spouse name: Not on file    Number of children: Not on file    Years of education: Not on file    Highest education level: Not on file   Occupational History    Not on file   Tobacco Use    Smoking status: Former     Packs/day: 2.00 Types: Cigarettes     Quit date: 2011     Years since quittin.4    Smokeless tobacco: Never   Substance and Sexual Activity    Alcohol use: No     Alcohol/week: 0.0 standard drinks    Drug use: No     Types: Prescription    Sexual activity: Not on file   Other Topics Concern    Not on file   Social History Narrative    80-pack-year smoking. Quit 2011. Occasional alcohol. He is . He is on disability for COPD. Worked in the past with the [employer] working on [employer] and [employer] were he was exposed to asbestos. Social Determinants of Health     Financial Resource Strain: Not on file   Food Insecurity: Not on file   Transportation Needs: Not on file   Physical Activity: Not on file   Stress: Not on file   Social Connections: Not on file   Intimate Partner Violence: Not on file   Housing Stability: Not on file     Family History   Problem Relation Age of Onset    Other Other         Nephrolithiasis    Kidney Disease Other     Cancer Mother     Stroke Father         CVA X 2- due to off anticoagulant    Hypertension Father     Heart Attack Father         MI X 2 at age 61    Heart Disease Father        Review of Systems  All systems reviewed and are negative at this time. Physical Exam  There were no vitals taken for this visit. General appearance - alert, well appearing, and in no distress  Mental status - alert, oriented to person, place, and time  Eyes - extraocular eye movements intact, sclera anicteric  Abdomen - soft, nontender, nondistended, no masses or organomegaly  Neurological -  normal speech, no focal findings or movement disorder noted  Skin - normal coloration and turgor      Assessment/Plan    ICD-10-CM    1. Malignant neoplasm of prostate (HCC)  C61 AMB POC URINALYSIS DIP STICK AUTO W/O MICRO     PSA, ultrasensitive      2.  Erectile dysfunction, unspecified erectile dysfunction type  N52.9 AMB POC URINALYSIS DIP STICK AUTO W/O MICRO        Alternate tx options for ED again reviewed. RTO in 12 mo with PSA prior.     MADELINE MC, DO

## 2022-11-10 ENCOUNTER — HOSPITAL ENCOUNTER (EMERGENCY)
Age: 67
Discharge: LEFT BEFORE DISCHARGE | End: 2022-11-10
Payer: MEDICARE

## 2022-11-10 VITALS
HEIGHT: 72 IN | BODY MASS INDEX: 27.9 KG/M2 | HEART RATE: 60 BPM | SYSTOLIC BLOOD PRESSURE: 152 MMHG | RESPIRATION RATE: 18 BRPM | DIASTOLIC BLOOD PRESSURE: 82 MMHG | OXYGEN SATURATION: 95 % | TEMPERATURE: 97.6 F | WEIGHT: 206 LBS

## 2022-11-10 PROCEDURE — 93005 ELECTROCARDIOGRAM TRACING: CPT | Performed by: PHYSICIAN ASSISTANT

## 2022-11-10 ASSESSMENT — PAIN - FUNCTIONAL ASSESSMENT: PAIN_FUNCTIONAL_ASSESSMENT: NONE - DENIES PAIN

## 2022-11-10 NOTE — ED TRIAGE NOTES
Pt arrives to ED via EMS from home for reports of hand tightness & episode of sweating. Pt with hx of anxiety but has not taken medications in years. . Pt with hx of afib & ablation, nuclear stress test completed this morning. 12 lead NSR per EMS. 148/59, 97% on RA. Pt placed on 2L NC for comfort. Pt reports relief of hand tightness on arrival. Pt wife told EMS she believes he had panic attack.

## 2022-11-11 LAB
EKG DIAGNOSIS: NORMAL
EKG Q-T INTERVAL: 456 MS
EKG QRS DURATION: 84 MS
EKG QTC CALCULATION (BAZETT): 478 MS
EKG R AXIS: 57 DEGREES
EKG T AXIS: 36 DEGREES
EKG VENTRICULAR RATE: 66 BPM

## 2023-08-29 ENCOUNTER — NURSE ONLY (OUTPATIENT)
Dept: UROLOGY | Age: 68
End: 2023-08-29

## 2023-08-29 DIAGNOSIS — C61 MALIGNANT NEOPLASM OF PROSTATE (HCC): ICD-10-CM

## 2023-09-01 LAB — PSA SERPL DL<=0.01 NG/ML-MCNC: <0.006 NG/ML (ref 0–4)

## 2023-09-05 ENCOUNTER — OFFICE VISIT (OUTPATIENT)
Dept: UROLOGY | Age: 68
End: 2023-09-05
Payer: MEDICARE

## 2023-09-05 DIAGNOSIS — N52.9 ERECTILE DYSFUNCTION, UNSPECIFIED ERECTILE DYSFUNCTION TYPE: ICD-10-CM

## 2023-09-05 DIAGNOSIS — C61 MALIGNANT NEOPLASM OF PROSTATE (HCC): Primary | ICD-10-CM

## 2023-09-05 LAB
BILIRUBIN, URINE, POC: NEGATIVE
BLOOD URINE, POC: NEGATIVE
GLUCOSE URINE, POC: NEGATIVE
KETONES, URINE, POC: NEGATIVE
LEUKOCYTE ESTERASE, URINE, POC: NEGATIVE
NITRITE, URINE, POC: NEGATIVE
PH, URINE, POC: 6 (ref 4.6–8)
PROTEIN,URINE, POC: NEGATIVE
SPECIFIC GRAVITY, URINE, POC: 1.03 (ref 1–1.03)
URINALYSIS CLARITY, POC: NORMAL
URINALYSIS COLOR, POC: NORMAL
UROBILINOGEN, POC: NORMAL

## 2023-09-05 PROCEDURE — 1123F ACP DISCUSS/DSCN MKR DOCD: CPT | Performed by: UROLOGY

## 2023-09-05 PROCEDURE — 81003 URINALYSIS AUTO W/O SCOPE: CPT | Performed by: UROLOGY

## 2023-09-05 PROCEDURE — 99214 OFFICE O/P EST MOD 30 MIN: CPT | Performed by: UROLOGY

## 2023-09-05 NOTE — PROGRESS NOTES
[facility]rology  [address] [address]  [phone]    Laureen Tom  : 1955     HPI   70 y.o., male returns in follow up for  CaP. S/P RALP on 13. Final path was [place] 6, T2aNx with neg margins. Reports improved GARY since losing wt. Taking Cialis prior to surgery. Cont to report ED post op. Failed pdei's and inj tx due to pain with injection. He elected not to pursue IPP. PSA remains und on 23. No new complaints.       Past Medical History:   Diagnosis Date    Adverse effect of anesthesia     pt woke up during last colonoscopy    Arrhythmia 16    hx of a fib- \"I'm out of it\" -per Dr Benny Villasenor; fingers    Bronchitis, chronic ([address]) 16    hx of     Carotid stenosis     bilat carotid stenosis    Chronic obstructive pulmonary disease ([address]) 16    affirms no SOB with 1 flight of steps; no 02; scheduled meds; last used rescue inhaler 2 d ago- avg at least 1 X wk    Chronic pain     OA- ALL MAJOR JOINTS    GERD (gastroesophageal reflux disease)     Hypertension     Malignant neoplasm of prostate ([address]) 2013    Other ill-defined conditions(799.89)     Prostate cancer ([address]) Dx 2012    Psychiatric disorder     anxiety- depression    Sleep apnea     uses CPAP     Past Surgical History:   Procedure Laterality Date    CARDIAC CATHETERIZATION      COLONOSCOPY      COLONOSCOPY N/A 2016    COLONOSCOPY performed by Rashida Lacy MD at [facility] ENDOSCOPY    COLONOSCOPY N/A 3/27/2019    COLONOSCOPY/ 36 performed by Rashida Lacy MD at [facility] ENDOSCOPY    CYST REMOVAL      OTHER SURGICAL HISTORY      pilonidal cyst     OTHER SURGICAL HISTORY  1983    lumbar     PROSTATECTOMY  2013     Current Outpatient Medications   Medication Sig Dispense Refill    albuterol sulfate  (90 Base) MCG/ACT inhaler Inhale 2 puffs into the lungs every 4 hours as needed      albuterol (PROVENTIL) (2.5 MG/3ML) 0.083%

## 2024-01-26 ENCOUNTER — TELEPHONE (OUTPATIENT)
Dept: UROLOGY | Age: 69
End: 2024-01-26

## 2024-01-26 NOTE — TELEPHONE ENCOUNTER
Patient called to see if he could get an increase in trimix dosage, it looks like he has been on super super trimix with lower PGE1 dosage (10) per your last note.

## 2024-07-02 ENCOUNTER — OFFICE VISIT (OUTPATIENT)
Age: 69
End: 2024-07-02
Payer: MEDICARE

## 2024-07-02 VITALS — HEIGHT: 71 IN | WEIGHT: 200 LBS | BODY MASS INDEX: 28 KG/M2

## 2024-07-02 DIAGNOSIS — M48.062 SPINAL STENOSIS, LUMBAR REGION, WITH NEUROGENIC CLAUDICATION: ICD-10-CM

## 2024-07-02 DIAGNOSIS — M51.36 DISCOGENIC LOW BACK PAIN: Primary | ICD-10-CM

## 2024-07-02 PROCEDURE — 1123F ACP DISCUSS/DSCN MKR DOCD: CPT | Performed by: ORTHOPAEDIC SURGERY

## 2024-07-02 PROCEDURE — 99203 OFFICE O/P NEW LOW 30 MIN: CPT | Performed by: ORTHOPAEDIC SURGERY

## 2024-07-02 NOTE — PROGRESS NOTES
Name: Js Brumfield  YOB: 1955  Gender: male  MRN: [id number]  Age: 69 y.o.      Chief Complaint: Left buttock pain    History of Present Illness:      This is a very pleasant 69 y.o. male who presents with a history of left buttock pain.  Symptoms are worse with standing or walking.  Patient has to sit down or lean forward to make his symptoms improved.  He does have a prior history of lumbar spine surgery done by Dr. Ren about 30 years ago for a ruptured disc at L4-5.  Patient describes the pain as sharp and 6 out of 10 in severity and constant.  He has not had any type of conservative treatment for this.  He does take Eliquis.  He does have a history of chronic A-fib as well as left ventricular diastolic dysfunction and cardiomegaly      Medications:       Current Outpatient Medications:     albuterol sulfate  (90 Base) MCG/ACT inhaler, Inhale 2 puffs into the lungs every 4 hours as needed, Disp: , Rfl:     albuterol (PROVENTIL) (2.5 MG/3ML) 0.083% nebulizer solution, Inhale 3 mLs into the lungs every 4 hours as needed, Disp: , Rfl:     apixaban (ELIQUIS) 5 MG TABS tablet, Take 1 tablet by mouth 2 times daily, Disp: , Rfl:     cyanocobalamin 1000 MCG/ML injection, Inject 1 mL into the muscle, Disp: , Rfl:     diclofenac (VOLTAREN) 75 MG EC tablet, Take by mouth, Disp: , Rfl:     dicyclomine (BENTYL) 10 MG capsule, Take 1 capsule by mouth 2 times daily, Disp: , Rfl:     DULoxetine (CYMBALTA) 60 MG extended release capsule, Take 2 capsules by mouth, Disp: , Rfl:     ergocalciferol (ERGOCALCIFEROL) 1.25 MG (70348 UT) capsule, Take 1 capsule by mouth every 7 days, Disp: , Rfl:     fenofibrate (TRICOR) 145 MG tablet, Take 1 tablet by mouth, Disp: , Rfl:     Fluticasone furoate-vilanterol (BREO ELLIPTA) 200-25 MCG/INH AEPB inhaler, Inhale 1 puff into the lungs daily, Disp: , Rfl:     fluticasone (FLONASE) 50 MCG/ACT nasal spray, 1 - 2 sprays daily each nostril, blow nose prior to use

## 2024-07-18 ENCOUNTER — HOSPITAL ENCOUNTER (OUTPATIENT)
Dept: PHYSICAL THERAPY | Age: 69
Setting detail: RECURRING SERIES
Discharge: HOME | End: 2024-07-21
Payer: MEDICARE

## 2024-07-18 DIAGNOSIS — R26.2 DIFFICULTY IN WALKING, NOT ELSEWHERE CLASSIFIED: ICD-10-CM

## 2024-07-18 DIAGNOSIS — M51.36 DISCOGENIC LOW BACK PAIN: Primary | ICD-10-CM

## 2024-07-18 DIAGNOSIS — M48.062 SPINAL STENOSIS, LUMBAR REGION WITH NEUROGENIC CLAUDICATION: ICD-10-CM

## 2024-07-18 PROCEDURE — 97110 THERAPEUTIC EXERCISES: CPT

## 2024-07-18 PROCEDURE — 97012 MECHANICAL TRACTION THERAPY: CPT

## 2024-07-18 PROCEDURE — 97161 PT EVAL LOW COMPLEX 20 MIN: CPT

## 2024-07-18 NOTE — PROGRESS NOTES
Js Brumfield  : 1955  Primary: [employer] Medicare-advantage Ppo (Medicare Managed)  Secondary:  [facility] [place]  [address] [address] [facility] [address]  Phone: [phone]  Fax: [phone] Plan Frequency: 2x per week and will decrease to 1x per week when appropriate over 8 weeks.  Plan of Care/Certification Expiration Date: 24        Plan of Care/Certification Expiration Date:  Plan of Care/Certification Expiration Date: 24    Frequency/Duration: Plan Frequency: 2x per week and will decrease to 1x per week when appropriate over 8 weeks.      Time In/Out:   Time In: 1530  Time Out: 1635      PT Visit Info:         Visit Count:  1    OUTPATIENT PHYSICAL THERAPY:   Treatment Note 2024       Episode  (LBP with stenosis)               Treatment Diagnosis:    Discogenic low back pain  Spinal stenosis, lumbar region with neurogenic claudication  Difficulty in walking, not elsewhere classified  Medical/Referring Diagnosis:    Discogenic low back pain [M51.36]  Spinal stenosis, lumbar region, with neurogenic claudication [M48.062]      Referring Physician:  Rosendo Dejesus MD MD Orders:  PT Eval and Treat   Return MD Appt:  unknown   Date of Onset:  Onset Date: 21     Allergies:   Patient has no known allergies.  Restrictions/Precautions:   Cardiac Precautions: coronary artery disease  Pulmonary Precautions:  COPD      Interventions Planned (Treatment may consist of any combination of the following):     See Assessment Note    Subjective Comments:   Pain in my back is the worst and I have sometimes lost my balance.  Also c/o pain in posterior L LE to knee.  Would like to avoid back surgery.   Initial Pain Level::     6 (on pain meds)/10  Post Session Pain Level:       4/10  Medications Last Reviewed:  2024  Updated Objective Findings:  See Evaluation Note from today  Treatment   THERAPEUTIC EXERCISE: (13 minutes):    Exercises per

## 2024-07-18 NOTE — THERAPY EVALUATION
Js Brumfield  : 1955  Primary: [employer] Medicare-advantage Ppo (Medicare Managed)  Secondary:  [facility] [place]  [address] [address] [facility] [address]  Phone: [phone]  Fax: [phone] Plan Frequency: 2x per week and will decrease to 1x per week when appropriate over 8 weeks.    Plan of Care/Certification Expiration Date: 24        Plan of Care/Certification Expiration Date:  Plan of Care/Certification Expiration Date: 24    Frequency/Duration: Plan Frequency: 2x per week and will decrease to 1x per week when appropriate over 8 weeks.      Time In/Out:   Time In: 1530  Time Out: 1635      PT Visit Info:         Visit Count:  1                OUTPATIENT PHYSICAL THERAPY:             Initial Assessment 2024               Episode (LBP with stenosis)         Treatment Diagnosis:     Discogenic low back pain  Spinal stenosis, lumbar region with neurogenic claudication  Difficulty in walking, not elsewhere classified  Medical/Referring Diagnosis:    Discogenic low back pain [M51.36]  Spinal stenosis, lumbar region, with neurogenic claudication [M48.062]      Referring Physician:  Rosendo Dejesus MD MD Orders:  PT Eval and Treat   Return MD Appt:  unknown  Date of Onset:  Onset Date: 21    Allergies:  Patient has no known allergies.  Restrictions/Precautions:    Cardiac Precautions: coronary artery disease  Pulmonary Precautions:  COPD      Medications Last Reviewed:  2024     SUBJECTIVE   History of Injury/Illness (Reason for Referral):  Has pain down L LE just to posterior knee with Toe 1-2 numbness as well as LBP.  Pain described as sharp, throbbing, aching and shooting. Pain is currently being controlled by meds. Has started having episodes of falling, tripping for the past year. He uses cane when he goes out for longer periods.  Does have increase LBP with coughing. PMHx of discectomy about 25 years ago with

## 2024-07-23 ENCOUNTER — HOSPITAL ENCOUNTER (OUTPATIENT)
Dept: PHYSICAL THERAPY | Age: 69
Setting detail: RECURRING SERIES
Discharge: HOME | End: 2024-07-26
Payer: MEDICARE

## 2024-07-23 PROCEDURE — 97012 MECHANICAL TRACTION THERAPY: CPT

## 2024-07-23 PROCEDURE — 97110 THERAPEUTIC EXERCISES: CPT

## 2024-07-23 PROCEDURE — 97140 MANUAL THERAPY 1/> REGIONS: CPT

## 2024-07-23 NOTE — PROGRESS NOTES
8/6/2024  2:30 PM Ida Vega, PT SFORPWD [facility]   8/15/2024  9:00 AM Ida Vega, PT [facility]   8/20/2024 10:00 AM Ida Vega, PT [facility]   9/3/2024  8:30 AM [id number] BLOOD DRAW [id number] [facility]   9/11/2024  8:45 AM Shane Mandel,  [id number] [facility] AMB

## 2024-07-25 ENCOUNTER — APPOINTMENT (OUTPATIENT)
Dept: PHYSICAL THERAPY | Age: 69
End: 2024-07-25
Payer: MEDICARE

## 2024-07-30 ENCOUNTER — HOSPITAL ENCOUNTER (OUTPATIENT)
Dept: PHYSICAL THERAPY | Age: 69
Setting detail: RECURRING SERIES
Discharge: HOME | End: 2024-08-02
Payer: MEDICARE

## 2024-07-30 PROCEDURE — 97110 THERAPEUTIC EXERCISES: CPT

## 2024-07-30 PROCEDURE — 97140 MANUAL THERAPY 1/> REGIONS: CPT

## 2024-07-30 PROCEDURE — 97012 MECHANICAL TRACTION THERAPY: CPT

## 2024-07-30 ASSESSMENT — PAIN SCALES - GENERAL: PAINLEVEL_OUTOF10: 3

## 2024-07-30 NOTE — PROGRESS NOTES
mobility, and work on standing posture for improved core strategies.    >Total Treatment Billable Duration:  58 minutes   Time In: 1330  Time Out: 1435    Ida Vega PT         Charge Capture  Events  [facility]  Appt Desk  Attendance Report     Future Appointments   Date Time Provider Department Center   8/6/2024  2:30 PM Ida Vega, PT [facility]   8/15/2024  9:00 AM Ida Vega, PT [facility]   8/20/2024 10:00 AM Ida Vega, PT [facility]   9/3/2024  8:30 AM [id number] BLOOD DRAW [id number] [facility]   10/21/2024 10:15 AM [id number] BLOOD DRAW [id number] [facility]   10/28/2024  3:45 PM Shane Mandel, DO [id number] [facility] AMB

## 2024-08-06 ENCOUNTER — HOSPITAL ENCOUNTER (OUTPATIENT)
Dept: PHYSICAL THERAPY | Age: 69
Setting detail: RECURRING SERIES
Discharge: HOME | End: 2024-08-09
Payer: MEDICARE

## 2024-08-06 PROCEDURE — 97140 MANUAL THERAPY 1/> REGIONS: CPT

## 2024-08-06 PROCEDURE — 97110 THERAPEUTIC EXERCISES: CPT

## 2024-08-06 PROCEDURE — 97012 MECHANICAL TRACTION THERAPY: CPT

## 2024-08-06 ASSESSMENT — PAIN SCALES - GENERAL: PAINLEVEL_OUTOF10: 0

## 2024-08-06 NOTE — PROGRESS NOTES
Js Brumfield  : 1955  Primary: [employer] Medicare-advantage Ppo (Medicare Managed)  Secondary:  [facility] [place]  [address] [address]  [facility] [address]  Phone: [phone]  Fax: [phone] Plan Frequency: 2x per week and will decrease to 1x per week when appropriate over 8 weeks.  Plan of Care/Certification Expiration Date: 24        Plan of Care/Certification Expiration Date:  Plan of Care/Certification Expiration Date: 24    Frequency/Duration: Plan Frequency: 2x per week and will decrease to 1x per week when appropriate over 8 weeks.      Time In/Out:   Time In: 1435  Time Out: 1535      PT Visit Info:    Progress Note Counter: 4      Visit Count:  4    OUTPATIENT PHYSICAL THERAPY:   Treatment Note 2024       Episode  (LBP with stenosis)               Treatment Diagnosis:    Discogenic low back pain  Spinal stenosis, lumbar region with neurogenic claudication  Difficulty in walking, not elsewhere classified  Medical/Referring Diagnosis:    Discogenic low back pain [M51.36]  Spinal stenosis, lumbar region, with neurogenic claudication [M48.062]      Referring Physician:  Rosendo Dejesus MD MD Orders:  PT Eval and Treat   Return MD Appt:  unknown   Date of Onset:  Onset Date: 21     Allergies:   Patient has no known allergies.  Restrictions/Precautions:   Cardiac Precautions: coronary artery disease  Pulmonary Precautions:  COPD      Interventions Planned (Treatment may consist of any combination of the following):     See Assessment Note    Subjective Comments: Pain continues to be better.  Currently not taking any pain meds.     At [facility] Pain in my back is the worst and I have sometimes lost my balance.  Also c/o pain in posterior L LE to knee.  Would like to avoid back surgery.   Initial Pain Level::     0/10  Post Session Pain Level:       2/10  Medications Last Reviewed:  2024  Updated Objective Findings:   stands with

## 2024-08-15 ENCOUNTER — HOSPITAL ENCOUNTER (OUTPATIENT)
Dept: PHYSICAL THERAPY | Age: 69
Setting detail: RECURRING SERIES
Discharge: HOME | End: 2024-08-18
Payer: MEDICARE

## 2024-08-15 PROCEDURE — 97012 MECHANICAL TRACTION THERAPY: CPT

## 2024-08-15 PROCEDURE — 97110 THERAPEUTIC EXERCISES: CPT

## 2024-08-15 PROCEDURE — 97140 MANUAL THERAPY 1/> REGIONS: CPT

## 2024-08-15 ASSESSMENT — PAIN SCALES - GENERAL: PAINLEVEL_OUTOF10: 1

## 2024-08-15 NOTE — PROGRESS NOTES
Js Brumfield  : 1955  Primary: [employer] Medicare-advantage Ppo (Medicare Managed)  Secondary:  [facility] [place]  [address] [address]  [facility] [address]  Phone: [phone]  Fax: [phone] Plan Frequency: 2x per week and will decrease to 1x per week when appropriate over 8 weeks.  Plan of Care/Certification Expiration Date: 24        Plan of Care/Certification Expiration Date:  Plan of Care/Certification Expiration Date: 24    Frequency/Duration: Plan Frequency: 2x per week and will decrease to 1x per week when appropriate over 8 weeks.      Time In/Out:   Time In: 0900  Time Out: 1000      PT Visit Info:    Progress Note Counter: 5      Visit Count:  5    OUTPATIENT PHYSICAL THERAPY:   Treatment Note 8/15/2024       Episode  (LBP with stenosis)               Treatment Diagnosis:    Discogenic low back pain  Spinal stenosis, lumbar region with neurogenic claudication  Difficulty in walking, not elsewhere classified  Medical/Referring Diagnosis:    Discogenic low back pain [M51.36]  Spinal stenosis, lumbar region, with neurogenic claudication [M48.062]      Referring Physician:  Rosendo Dejesus MD MD Orders:  PT Eval and Treat   Return MD Appt:  unknown   Date of Onset:  Onset Date: 21     Allergies:   Patient has no known allergies.  Restrictions/Precautions:   Cardiac Precautions: coronary artery disease  Pulmonary Precautions:  COPD      Interventions Planned (Treatment may consist of any combination of the following):     See Assessment Note    Subjective Comments: states he feels the best he has in a long time.  Has not yet started a walking program.  At [facility] Pain in my back is the worst and I have sometimes lost my balance.  Also c/o pain in posterior L LE to knee.  Would like to avoid back surgery.   Initial Pain Level::     1 (with some motions- but mostly stays at a 0/10)/10  Post Session Pain Level:       0/10  Medications

## 2024-08-20 ENCOUNTER — HOSPITAL ENCOUNTER (OUTPATIENT)
Dept: PHYSICAL THERAPY | Age: 69
Setting detail: RECURRING SERIES
Discharge: HOME | End: 2024-08-23
Payer: MEDICARE

## 2024-08-20 ENCOUNTER — APPOINTMENT (OUTPATIENT)
Dept: PHYSICAL THERAPY | Age: 69
End: 2024-08-20
Payer: MEDICARE

## 2024-08-20 PROCEDURE — 97012 MECHANICAL TRACTION THERAPY: CPT

## 2024-08-20 PROCEDURE — 97110 THERAPEUTIC EXERCISES: CPT

## 2024-08-20 PROCEDURE — 97140 MANUAL THERAPY 1/> REGIONS: CPT

## 2024-08-20 ASSESSMENT — PAIN SCALES - GENERAL: PAINLEVEL_OUTOF10: 0

## 2024-08-20 NOTE — PROGRESS NOTES
Js Brumfield  : 1955  Primary: [employer] Medicare-advantage Ppo (Medicare Managed)  Secondary:  [facility] [place]  [place]  [facility] [address]  Phone: [phone]  Fax: [phone] Plan Frequency: 2x per week and will decrease to 1x per week when appropriate over 8 weeks.  Plan of Care/Certification Expiration Date: 24        Plan of Care/Certification Expiration Date:  Plan of Care/Certification Expiration Date: 24    Frequency/Duration: Plan Frequency: 2x per week and will decrease to 1x per week when appropriate over 8 weeks.      Time In/Out:   Time In: 1003  Time Out: 1103      PT Visit Info:    Progress Note Counter: 6      Visit Count:  6    OUTPATIENT PHYSICAL THERAPY:   Treatment Note 2024       Episode  (LBP with stenosis)               Treatment Diagnosis:    Discogenic low back pain  Spinal stenosis, lumbar region with neurogenic claudication  Difficulty in walking, not elsewhere classified  Medical/Referring Diagnosis:    Discogenic low back pain [M51.36]  Spinal stenosis, lumbar region, with neurogenic claudication [M48.062]      Referring Physician:  Rosendo Dejesus MD MD Orders:  PT Eval and Treat   Return MD Appt:  unknown   Date of Onset:  Onset Date: 21     Allergies:   Patient has no known allergies.  Restrictions/Precautions:   Cardiac Precautions: coronary artery disease  Pulmonary Precautions:  COPD      Interventions Planned (Treatment may consist of any combination of the following):     See Assessment Note    Subjective Comments: states he started a walking program and able to walk about 10-15 min without pain. He will be away for 1 month.   At [facility] Pain in my back is the worst and I have sometimes lost my balance.  Also c/o pain in posterior L LE to knee.  Would like to avoid back surgery.   Initial Pain Level::     0/10  Post Session Pain Level:       0/10  Medications Last Reviewed:

## 2024-09-24 ENCOUNTER — HOSPITAL ENCOUNTER (OUTPATIENT)
Dept: PHYSICAL THERAPY | Age: 69
Setting detail: RECURRING SERIES
Discharge: HOME | End: 2024-09-27
Payer: MEDICARE

## 2024-09-24 PROCEDURE — 97110 THERAPEUTIC EXERCISES: CPT

## 2024-09-24 ASSESSMENT — PAIN SCALES - GENERAL: PAINLEVEL_OUTOF10: 1

## 2024-10-01 ENCOUNTER — APPOINTMENT (OUTPATIENT)
Dept: PHYSICAL THERAPY | Age: 69
End: 2024-10-01
Payer: MEDICARE

## 2024-10-01 ENCOUNTER — HOSPITAL ENCOUNTER (OUTPATIENT)
Dept: PHYSICAL THERAPY | Age: 69
Setting detail: RECURRING SERIES
End: 2024-10-01
Payer: MEDICARE

## 2024-10-08 ENCOUNTER — APPOINTMENT (OUTPATIENT)
Dept: PHYSICAL THERAPY | Age: 69
End: 2024-10-08
Payer: MEDICARE

## 2024-10-15 ENCOUNTER — APPOINTMENT (OUTPATIENT)
Dept: PHYSICAL THERAPY | Age: 69
End: 2024-10-15
Payer: MEDICARE

## 2024-10-15 ENCOUNTER — HOSPITAL ENCOUNTER (OUTPATIENT)
Dept: PHYSICAL THERAPY | Age: 69
Setting detail: RECURRING SERIES
Discharge: HOME | End: 2024-10-18
Payer: MEDICARE

## 2024-10-15 PROCEDURE — 97110 THERAPEUTIC EXERCISES: CPT

## 2024-10-15 PROCEDURE — 97012 MECHANICAL TRACTION THERAPY: CPT

## 2024-10-15 ASSESSMENT — PAIN SCALES - GENERAL: PAINLEVEL_OUTOF10: 0

## 2024-10-15 NOTE — PROGRESS NOTES
Js Brumfield  : 1955  Primary: [employer] Medicare-advantage Ppo (Medicare Managed)  Secondary:  [facility] [place]  [place] [facility] [address]  Phone: [phone]  Fax: [phone] Plan Frequency: 1x per week  Plan of Care/Certification Expiration Date: 24        Plan of Care/Certification Expiration Date:  Plan of Care/Certification Expiration Date: 24    Frequency/Duration: Plan Frequency: 1x per week      Time In/Out:   Time In: 1105  Time Out: 1210      PT Visit Info:    Progress Note Counter: 8      Visit Count:  8    OUTPATIENT PHYSICAL THERAPY:   Treatment Note 10/15/2024       Episode  (LBP with stenosis)               Treatment Diagnosis:    Discogenic low back pain  Spinal stenosis, lumbar region with neurogenic claudication  Difficulty in walking, not elsewhere classified  Medical/Referring Diagnosis:    Discogenic low back pain [M51.36]  Spinal stenosis, lumbar region, with neurogenic claudication [M48.062]      Referring Physician:  Rosendo Dejesus MD MD Orders:  PT Eval and Treat   Return MD Appt:  unknown   Date of Onset:  Onset Date: 21     Allergies:   Patient has no known allergies.  Restrictions/Precautions:   Cardiac Precautions: coronary artery disease  Pulmonary Precautions:  COPD      Interventions Planned (Treatment may consist of any combination of the following):     See Assessment Note    Subjective Comments: Had last steroid shot.   still having pain in low back with getting up from sitting that takes a while to resolve.  Riding his bike more- walking only about 10 minutes.   At [facility] Pain in my back is the worst and I have sometimes lost my balance.  Also c/o pain in posterior L LE to knee.  Would like to avoid back surgery.   Initial Pain Level::     0/10  Post Session Pain Level:       0/10  Medications Last Reviewed:  10/15/2024  Updated Objective Findings:   tight quads and psoas still  Treatment

## 2024-10-21 ENCOUNTER — LAB (OUTPATIENT)
Dept: UROLOGY | Age: 69
End: 2024-10-21

## 2024-10-21 DIAGNOSIS — C61 MALIGNANT NEOPLASM OF PROSTATE (HCC): ICD-10-CM

## 2024-10-22 ENCOUNTER — APPOINTMENT (OUTPATIENT)
Dept: PHYSICAL THERAPY | Age: 69
End: 2024-10-22
Payer: MEDICARE

## 2024-10-22 LAB — PSA SERPL DL<=0.01 NG/ML-MCNC: <0.006 NG/ML (ref 0–4)

## 2024-10-28 ENCOUNTER — OFFICE VISIT (OUTPATIENT)
Dept: UROLOGY | Age: 69
End: 2024-10-28
Payer: MEDICARE

## 2024-10-28 DIAGNOSIS — C61 MALIGNANT NEOPLASM OF PROSTATE (HCC): Primary | ICD-10-CM

## 2024-10-28 LAB
BILIRUBIN, URINE, POC: NEGATIVE
BLOOD URINE, POC: NEGATIVE
GLUCOSE URINE, POC: NEGATIVE MG/DL
KETONES, URINE, POC: NORMAL MG/DL
LEUKOCYTE ESTERASE, URINE, POC: NEGATIVE
NITRITE, URINE, POC: NEGATIVE
PH, URINE, POC: 6 (ref 4.6–8)
PROTEIN,URINE, POC: NORMAL MG/DL
SPECIFIC GRAVITY, URINE, POC: 1.03 (ref 1–1.03)
URINALYSIS CLARITY, POC: NORMAL
URINALYSIS COLOR, POC: NORMAL
UROBILINOGEN, POC: NORMAL MG/DL

## 2024-10-28 PROCEDURE — 81003 URINALYSIS AUTO W/O SCOPE: CPT | Performed by: UROLOGY

## 2024-10-28 PROCEDURE — 1159F MED LIST DOCD IN RCRD: CPT | Performed by: UROLOGY

## 2024-10-28 PROCEDURE — 1123F ACP DISCUSS/DSCN MKR DOCD: CPT | Performed by: UROLOGY

## 2024-10-28 PROCEDURE — 99214 OFFICE O/P EST MOD 30 MIN: CPT | Performed by: UROLOGY

## 2024-10-28 NOTE — PROGRESS NOTES
[facility]  [address] [place] [address]  [phone]    Js Brumfield  : 1955     HPI   69 y.o., male returns in follow up for CaP.  S/P RALP on 13. Final path was Frances 6, T2aNx with neg margins. Reports improved GARY since losing wt.  Taking Cialis prior to surgery. Cont to report ED post op. Failed pdei's and inj tx due to pain with injection.  He elected not to pursue IPP.  PSA remains und on 10/21/24.  No new complaints.       Past Medical History:   Diagnosis Date    Adverse effect of anesthesia     pt woke up during last colonoscopy    Arrhythmia 16    hx of a fib- \"I'm out of it\" -per Dr Mae     Arthritis     OA- ALL MAJOR JOINTS; fingers    Bronchitis, chronic (HCC) 16    hx of     Carotid stenosis     bilat carotid stenosis    Chronic obstructive pulmonary disease (HCC) 16    affirms no SOB with 1 flight of steps; no 02; scheduled meds; last used rescue inhaler 2 d ago- avg at least 1 X wk    Chronic pain     OA- ALL MAJOR JOINTS    GERD (gastroesophageal reflux disease)     Hypertension     Malignant neoplasm of prostate (HCC) 2013    Other ill-defined conditions(799.89)     Prostate cancer (HCC) Dx 2012    Psychiatric disorder     anxiety- depression    Sleep apnea     uses CPAP     Past Surgical History:   Procedure Laterality Date    CARDIAC CATHETERIZATION      COLONOSCOPY      COLONOSCOPY N/A 2016    COLONOSCOPY performed by Dax Beard MD at [facility] ENDOSCOPY    COLONOSCOPY N/A 3/27/2019    COLONOSCOPY/ 36 performed by Dax Beard MD at [facility] ENDOSCOPY    CYST REMOVAL      OTHER SURGICAL HISTORY      pilonidal cyst     OTHER SURGICAL HISTORY  1983    lumbar     PROSTATECTOMY  2013     Current Outpatient Medications   Medication Sig Dispense Refill    albuterol sulfate  (90 Base) MCG/ACT inhaler Inhale 2 puffs into the lungs every 4 hours as needed      albuterol (PROVENTIL) (2.5 MG/3ML) 0.083%

## 2024-10-29 ENCOUNTER — HOSPITAL ENCOUNTER (OUTPATIENT)
Dept: PHYSICAL THERAPY | Age: 69
Setting detail: RECURRING SERIES
Discharge: HOME | End: 2024-11-01
Payer: MEDICARE

## 2024-10-29 PROCEDURE — 97110 THERAPEUTIC EXERCISES: CPT

## 2024-10-29 NOTE — PROGRESS NOTES
mobilization was utilized and necessary because of the patient's restricted joint motion.   JAYLENE hip PA glide for improving hip extension ROM  Lumbar transverse glides grade 2-3 (not today)  Pelvis side glide L 10x(not today)  Lower and mid thoracic PA's    LUMBAR TRACTION (0 min) to decrease pain, improve function.  NOT TODAY  Mechanical lumbar traction intermittent at 120# on for 60 sec and 10 sec rest    Treatment/Session Summary:    Treatment Assessment: doing well with current therex- improving endurance. Will assess long term response to traction next session.   Communication/Consultation:  None today  Equipment provided today:  HEP updates with hand out.   Recommendations/Intent for next treatment session: Next visit will focus on continued lumbar traction, anterior hip capsule and psoas stretching, improving lumbar mobility, and work on standing posture for improved core strategies.    >Total Treatment Billable Duration:  53 minutes   Time In: 1005  Time Out: 1115    Ida Vega PT         Charge Capture  Events  Focus IP Portal  Appt Desk  Attendance Report     Future Appointments   Date Time Provider Department Center   11/7/2024 10:00 AM Ida Vega, PT [facility]   11/14/2024 10:00 AM Ida Vega, PT [facility]   11/21/2024 10:00 AM Ida Vega, PT [facility]   11/4/2025  8:30 AM [id number] BLOOD DRAW [id number] [facility]   11/11/2025  8:30 AM Shane Mandel DO [id number] [facility]

## 2024-11-01 ENCOUNTER — APPOINTMENT (OUTPATIENT)
Dept: PHYSICAL THERAPY | Age: 69
End: 2024-11-01
Payer: MEDICARE

## 2024-11-05 ENCOUNTER — APPOINTMENT (OUTPATIENT)
Dept: PHYSICAL THERAPY | Age: 69
End: 2024-11-05
Payer: MEDICARE

## 2024-11-07 ENCOUNTER — HOSPITAL ENCOUNTER (OUTPATIENT)
Dept: PHYSICAL THERAPY | Age: 69
Setting detail: RECURRING SERIES
Discharge: HOME | End: 2024-11-10
Payer: MEDICARE

## 2024-11-07 PROCEDURE — 97110 THERAPEUTIC EXERCISES: CPT

## 2024-11-07 PROCEDURE — 97140 MANUAL THERAPY 1/> REGIONS: CPT

## 2024-11-07 NOTE — PROGRESS NOTES
motion.   JAYLENE hip PA glide for improving hip extension ROM (not today)  Lumbar transverse glides grade 2-3   Pelvis side glide L 10x  mid thoracic PA's    LUMBAR TRACTION (0 min) to decrease pain, improve function.  NOT TODAY  Mechanical lumbar traction intermittent at 120# on for 60 sec and 10 sec rest    Treatment/Session Summary:    Treatment Assessment: Pt is tolerating current therex for core strengthening with some difficulty secondary to COPD. He will start incorporating strengthening program into his exercises 2-3x per week and needs review/progression still.    Communication/Consultation:  None today  Equipment provided today:  HEP updates with hand out.   Recommendations/Intent for next treatment session: Next visit will focus on continued lumbar traction, anterior hip capsule and psoas stretching, improving lumbar mobility, and work on standing posture for improved core strategies.    >Total Treatment Billable Duration:  55 minutes   Time In: 1003  Time Out: 1102    Ida Vega PT         Charge Capture  Events  [employer] Portal  Appt Desk  Attendance Report     Future Appointments   Date Time Provider Department Center   11/14/2024 10:00 AM Ida Vega PT [facility]   11/21/2024 10:00 AM Ida Vega PT [facility]   11/4/2025  8:30 AM [id number] BLOOD DRAW [id number] [facility] AMB   11/11/2025  8:30 AM Shane Mandel DO [id number] [facility] AMB

## 2024-11-14 ENCOUNTER — HOSPITAL ENCOUNTER (OUTPATIENT)
Dept: PHYSICAL THERAPY | Age: 69
Setting detail: RECURRING SERIES
Discharge: HOME | End: 2024-11-17
Payer: MEDICARE

## 2024-11-14 PROCEDURE — 97110 THERAPEUTIC EXERCISES: CPT

## 2024-11-14 PROCEDURE — 97140 MANUAL THERAPY 1/> REGIONS: CPT

## 2024-11-14 ASSESSMENT — PAIN SCALES - GENERAL: PAINLEVEL_OUTOF10: 0

## 2024-11-14 NOTE — PROGRESS NOTES
Js Brumfield  : 1955  Primary: [employer] Medicare-advantage Ppo (Medicare Managed)  Secondary:  [facility] [place]  [address] [facility] [address]  Phone: [phone]  Fax: [phone] Plan Frequency: 1x per week  Plan of Care/Certification Expiration Date: 24        Plan of Care/Certification Expiration Date:  Plan of Care/Certification Expiration Date: 24    Frequency/Duration: Plan Frequency: 1x per week      Time In/Out:          PT Visit Info:    Progress Note Counter: 2      Visit Count:  11    OUTPATIENT PHYSICAL THERAPY:   Treatment Note 2024       Episode  (LBP with stenosis)               Treatment Diagnosis:    Discogenic low back pain  Spinal stenosis, lumbar region with neurogenic claudication  Difficulty in walking, not elsewhere classified  Medical/Referring Diagnosis:    Discogenic low back pain [M51.36]  Spinal stenosis, lumbar region, with neurogenic claudication [M48.062]      Referring Physician:  Rosendo Dejesus MD MD Orders:  PT Eval and Treat   Return MD Appt:  unknown   Date of Onset:  Onset Date: 21     Allergies:   Patient has no known allergies.  Restrictions/Precautions:   Cardiac Precautions: coronary artery disease  Pulmonary Precautions:  COPD      Interventions Planned (Treatment may consist of any combination of the following):     See Assessment Note    Subjective Comments: states he felt good after last session.   At [facility] Pain in my back is the worst and I have sometimes lost my balance.  Also c/o pain in posterior L LE to knee.  Would like to avoid back surgery.   Initial Pain Level::     0/10  Post Session Pain Level:       3 (at upper lumbar, not lower)/10  Medications Last Reviewed:  2024  Updated Objective Findings:   stiff to L1 and thoracic spine with flexion and extension, improved motion at with L5 transverse L  Treatment       THERAPEUTIC EXERCISE: (40 minutes):    Exercises

## 2024-11-21 ENCOUNTER — HOSPITAL ENCOUNTER (OUTPATIENT)
Dept: PHYSICAL THERAPY | Age: 69
Setting detail: RECURRING SERIES
Discharge: HOME | End: 2024-11-24
Payer: MEDICARE

## 2024-11-21 PROCEDURE — 97140 MANUAL THERAPY 1/> REGIONS: CPT

## 2024-11-21 PROCEDURE — 97110 THERAPEUTIC EXERCISES: CPT

## 2024-11-21 ASSESSMENT — PAIN SCALES - GENERAL: PAINLEVEL_OUTOF10: 1

## 2024-11-21 NOTE — PROGRESS NOTES
with PT assist for improved trunk rotation   Education   Review of HEP with updates     MANUAL THERAPY: (15 minutes):  Joint mobilization was utilized and necessary because of the patient's restricted joint motion.   JAYLENE hip PA glide for improving hip extension ROM (not today)  Sit forward bend with lumbar intersegmental opening mobilization   Muscle Energy Technique for mid thoracic and T12 extension dysfunctions    LUMBAR TRACTION (0 min) to decrease pain, improve function.  NOT TODAY  Mechanical lumbar traction intermittent at 120# on for 60 sec and 10 sec rest    Treatment/Session Summary:    Treatment Assessment: Back pain seemed to improve with working on spinal segmental flexion, especially at T12.  Pt is improving core strength with current POC but would benefit from continuation of progression of this to improve his functional strength with walking, lifting when needed.   Communication/Consultation:  None today  Equipment provided today:  None   Recommendations/Intent for next treatment session: Next visit will focus on continuation in improving lumbar mobility, core strength and work on standing posture for improved core strategies.    >Total Treatment Billable Duration:  55 minutes   Time In: 1005  Time Out: 1105    Ida Vega PT         Charge Capture  Events  Given.to Portal  Appt Desk  Attendance Report     Future Appointments   Date Time Provider Department Center   12/5/2024 11:00 AM Ida Vega PT [facility]   12/12/2024 11:00 AM Ida Vega PT [facility]   11/4/2025  8:30 AM [id number] BLOOD DRAW [id number] [facility] AMB   11/11/2025  8:30 AM Shane Mandel DO [id number] [facility] AMB

## 2024-11-21 NOTE — THERAPY RECERTIFICATION
1/10   Post Session Pain Level:      /10 no VAS but stated his back felt better with stretches we did today.   Pt states general improvement in function.  Mostly limited with walking and lifting. Had onset of episode of back/chest pain after last session but this has resolved.  Generally has minimal to no c/o LBP but is still limited in with his normal levels of function.     Fall Risk Scale:   Astudillo Total Score: 55    Other Clinical Tests:  MRI summary: 6/26/24  Multilevel degeneration, particularly in the upper lumbar spine. Mild  abnormal marrow signal is present at L1, L2 and the superior endplate of L3, allof which is presumed due to chronic degeneration. If there is point tenderness  in this region, bone scan may be useful for further evaluation. At L1-2, thereis severe bilateral neural foraminal narrowing. At L2-3, there is moderate to  severe bilateral neural foraminal narrowing.     OBJECTIVE      Patient denies any saddle paresthesia or bowel/bladder deficits.   Patient denies  any headaches, changes in vision, dizziness, vertigo, nausea, drop attacks, black outs, tinnitus, dysphagia, dysarthria, LE symptoms or bowel/bladder dysfunction.    Observation/Orthostatic Postural Assessment:          Standing: Now standing up straighter and no pelvic glide to the R.     Palpation:          Not as tender to general lumbar region but was tender to and stiff to T12 today  ROM:      AROM (degrees)   Lumbar Flexion 4 in from floor (from 8 in from floor)   Lumbar Extension 25 (from  10 deg \"feels good\")     AROM/ PROM Left (degrees) Right (degrees)   Hip Flexion 90 90   Hip Extension 5 (from -2 and increases back pain) 5 (from 0)   Hip Internal Rotation   prone 8(from 5 pain in back) 20   Hip External Rotation   prone WNL WNL     Strength:    Motion Tested Left   (*/5) Right  (*/5)   Hip Flexion 5 5   Hip Extension 4- (from painful) 4- (from painful)   Knee Extension 5 5   Ankle Dorsiflexion 5 5   Ankle

## 2024-12-05 ENCOUNTER — APPOINTMENT (OUTPATIENT)
Dept: PHYSICAL THERAPY | Age: 69
End: 2024-12-05
Payer: MEDICARE

## 2024-12-12 ENCOUNTER — HOSPITAL ENCOUNTER (OUTPATIENT)
Dept: PHYSICAL THERAPY | Age: 69
Setting detail: RECURRING SERIES
Discharge: HOME | End: 2024-12-15
Payer: MEDICARE

## 2024-12-12 PROCEDURE — 97110 THERAPEUTIC EXERCISES: CPT

## 2024-12-12 NOTE — PROGRESS NOTES
Js Brumfield  : 1955  Primary: [employer] Medicare-advantage Ppo (Medicare Managed)  Secondary:  [facility] [place]  [place] [facility] [address]  Phone: [phone]  Fax: [phone] Plan Frequency: 1x per week  Plan of Care/Certification Expiration Date: 25        Plan of Care/Certification Expiration Date:  Plan of Care/Certification Expiration Date: 25    Frequency/Duration: Plan Frequency: 1x per week      Time In/Out:   Time In: 1105  Time Out: 1210      PT Visit Info:    Progress Note Counter: 1      Visit Count:  13    OUTPATIENT PHYSICAL THERAPY:   Treatment Note 2024       Episode  (LBP with stenosis)               Treatment Diagnosis:    Discogenic low back pain  Spinal stenosis, lumbar region with neurogenic claudication  Difficulty in walking, not elsewhere classified  Medical/Referring Diagnosis:    Discogenic low back pain [M51.36]  Spinal stenosis, lumbar region, with neurogenic claudication [M48.062]      Referring Physician:  Rosendo Dejesus MD MD Orders:  PT Eval and Treat   Return MD Appt:  unknown   Date of Onset:  Onset Date: 21     Allergies:   Patient has no known allergies.  Restrictions/Precautions:   Cardiac Precautions: coronary artery disease  Pulmonary Precautions:  COPD      Interventions Planned (Treatment may consist of any combination of the following):     See Assessment Note    Subjective Comments: states pain in the thoracic spine comes and goes. Pain in back with prolonged sitting.  But overall, back is still doing much better.  At [facility] Pain in my back is the worst and I have sometimes lost my balance.  Also c/o pain in posterior L LE to knee.  Would like to avoid back surgery.   Initial Pain Level::      /10  Post Session Pain Level:        /10  Medications Last Reviewed:  2024  Updated Objective Findings:   stiff to L1 to T10 with transverse L glide with extension bias  Treatment

## 2024-12-19 ENCOUNTER — OFFICE VISIT (OUTPATIENT)
Dept: VASCULAR SURGERY | Age: 69
End: 2024-12-19
Payer: MEDICARE

## 2024-12-19 VITALS
WEIGHT: 207 LBS | HEART RATE: 56 BPM | DIASTOLIC BLOOD PRESSURE: 78 MMHG | SYSTOLIC BLOOD PRESSURE: 132 MMHG | BODY MASS INDEX: 28.98 KG/M2 | HEIGHT: 71 IN | OXYGEN SATURATION: 93 %

## 2024-12-19 DIAGNOSIS — I48.92 ATRIAL FIB/FLUTTER, TRANSIENT (HCC): Primary | ICD-10-CM

## 2024-12-19 DIAGNOSIS — R20.0 NUMBNESS OF TOES: ICD-10-CM

## 2024-12-19 DIAGNOSIS — I48.91 ATRIAL FIB/FLUTTER, TRANSIENT (HCC): Primary | ICD-10-CM

## 2024-12-19 DIAGNOSIS — I73.9 PAD (PERIPHERAL ARTERY DISEASE) (HCC): ICD-10-CM

## 2024-12-19 PROCEDURE — 1123F ACP DISCUSS/DSCN MKR DOCD: CPT | Performed by: NURSE PRACTITIONER

## 2024-12-19 PROCEDURE — 99203 OFFICE O/P NEW LOW 30 MIN: CPT | Performed by: NURSE PRACTITIONER

## 2025-01-14 ENCOUNTER — CLINICAL DOCUMENTATION (OUTPATIENT)
Dept: ORTHOPEDIC SURGERY | Age: 70
End: 2025-01-14

## 2025-02-27 ENCOUNTER — INITIAL CONSULT (OUTPATIENT)
Age: 70
End: 2025-02-27
Payer: MEDICARE

## 2025-02-27 ENCOUNTER — TELEPHONE (OUTPATIENT)
Age: 70
End: 2025-02-27

## 2025-02-27 VITALS
BODY MASS INDEX: 29.12 KG/M2 | WEIGHT: 208 LBS | HEART RATE: 59 BPM | HEIGHT: 71 IN | SYSTOLIC BLOOD PRESSURE: 110 MMHG | DIASTOLIC BLOOD PRESSURE: 60 MMHG

## 2025-02-27 DIAGNOSIS — I48.91 ATRIAL FIBRILLATION WITH RVR (HCC): Primary | ICD-10-CM

## 2025-02-27 DIAGNOSIS — G47.33 OSA (OBSTRUCTIVE SLEEP APNEA): ICD-10-CM

## 2025-02-27 PROCEDURE — 93000 ELECTROCARDIOGRAM COMPLETE: CPT | Performed by: INTERNAL MEDICINE

## 2025-02-27 PROCEDURE — 99214 OFFICE O/P EST MOD 30 MIN: CPT | Performed by: INTERNAL MEDICINE

## 2025-02-27 PROCEDURE — 1123F ACP DISCUSS/DSCN MKR DOCD: CPT | Performed by: INTERNAL MEDICINE

## 2025-02-27 PROCEDURE — 1160F RVW MEDS BY RX/DR IN RCRD: CPT | Performed by: INTERNAL MEDICINE

## 2025-02-27 PROCEDURE — 1126F AMNT PAIN NOTED NONE PRSNT: CPT | Performed by: INTERNAL MEDICINE

## 2025-02-27 PROCEDURE — 1159F MED LIST DOCD IN RCRD: CPT | Performed by: INTERNAL MEDICINE

## 2025-02-27 RX ORDER — TEMAZEPAM 30 MG/1
CAPSULE ORAL
COMMUNITY
Start: 2024-12-05

## 2025-02-27 NOTE — PROGRESS NOTES
[facility], [address], [place] [address]  PHONE: [phone]  Js Brumfield  1955    Chief Complant:  No chief complaint on file.     Consultation is requested by [unfilled] for evaluation of No chief complaint on file.    Reason for Consultation:     History:  Js Brumfield is a very pleasant 69 y.o. male with a past medical and cardiac history significant for persistent AF,     Cardiac PMH: (Old records have been reviewed and summarized below)    Reviewed office note Standard NP 12/19/24    Past Medical History, Past Surgical History, Family history, Social History, and Medications were all reviewed with the patient today and updated as necessary.     Current Outpatient Medications   Medication Sig Dispense Refill    Potassium Chloride (KLOR-CON PO) Take by mouth      albuterol sulfate  (90 Base) MCG/ACT inhaler Inhale 2 puffs into the lungs every 4 hours as needed      albuterol (PROVENTIL) (2.5 MG/3ML) 0.083% nebulizer solution Inhale 3 mLs into the lungs every 4 hours as needed      apixaban (ELIQUIS) 5 MG TABS tablet Take 1 tablet by mouth 2 times daily      cyanocobalamin 1000 MCG/ML injection Inject 1 mL into the muscle      ergocalciferol (ERGOCALCIFEROL) 1.25 MG (13431 UT) capsule Take 1 capsule by mouth every 7 days      fluticasone (FLONASE) 50 MCG/ACT nasal spray 1 - 2 sprays daily each nostril, blow nose prior to use and do not blow nose for 20 min. After use      furosemide (LASIX) 40 MG tablet TAKE 1 TABLET BY MOUTH EVERY DAY      gabapentin (NEURONTIN) 100 MG capsule Take 2 capsules by mouth 2 times daily.      omeprazole (PRILOSEC) 40 MG delayed release capsule Take 1 capsule by mouth      rosuvastatin (CRESTOR) 20 MG tablet Take 1 tablet by mouth      sotalol (BETAPACE) 160 MG tablet Take 1 tablet by mouth 2 times daily      tiotropium (SPIRIVA) 18 MCG inhalation capsule Inhale 1 capsule into the lungs daily (Patient not taking:

## 2025-02-27 NOTE — TELEPHONE ENCOUNTER
Patient set up account with [employer] and account # [id number]. (To send eliquis) Please call an advise.

## (undated) DEVICE — SYR 3ML LL TIP 1/10ML GRAD --

## (undated) DEVICE — SNARE POLYP SM W13MMXL240CM SHTH DIA2.4MM OVL FLX DISP

## (undated) DEVICE — FORCEPS BX L240CM JAW DIA2.8MM L CAP W/ NDL MIC MESH TOOTH

## (undated) DEVICE — CONNECTOR TBNG OD5-7MM O2 END DISP

## (undated) DEVICE — SYR 5ML 1/5 GRAD LL NSAF LF --

## (undated) DEVICE — ESOPHAGEAL BALLOON DILATATION CATHETER: Brand: CRE FIXED WIRE

## (undated) DEVICE — CANNULA NSL ORAL AD FOR CAPNOFLEX CO2 O2 AIRLFE

## (undated) DEVICE — REM POLYHESIVE ADULT PATIENT RETURN ELECTRODE: Brand: VALLEYLAB

## (undated) DEVICE — KENDALL RADIOLUCENT FOAM MONITORING ELECTRODE RECTANGULAR SHAPE: Brand: KENDALL

## (undated) DEVICE — NDL PRT INJ NSAF BLNT 18GX1.5 --

## (undated) DEVICE — BLOCK BITE AD 60FR W/ VELC STRP ADDRESSES MOST PT AND

## (undated) DEVICE — CONTAINER PREFIL FRMLN 40ML --